# Patient Record
Sex: FEMALE | Race: WHITE | Employment: OTHER | URBAN - NONMETROPOLITAN AREA
[De-identification: names, ages, dates, MRNs, and addresses within clinical notes are randomized per-mention and may not be internally consistent; named-entity substitution may affect disease eponyms.]

---

## 2017-07-17 ENCOUNTER — HOSPITAL ENCOUNTER (OUTPATIENT)
Dept: NON INVASIVE DIAGNOSTICS | Age: 72
Discharge: HOME | End: 2017-07-17
Payer: MEDICARE

## 2019-09-06 ENCOUNTER — HOSPITAL ENCOUNTER (OUTPATIENT)
Dept: GENERAL RADIOLOGY | Age: 74
Discharge: HOME | End: 2019-09-06
Payer: MEDICARE

## 2019-09-06 ENCOUNTER — HOSPITAL ENCOUNTER (OUTPATIENT)
Age: 74
Discharge: HOME | End: 2019-09-06
Payer: MEDICARE

## 2019-09-06 DIAGNOSIS — M25.371 ANKLE INSTABILITY, RIGHT: ICD-10-CM

## 2019-09-06 DIAGNOSIS — M76.71 PERONEAL TENDONITIS, RIGHT: ICD-10-CM

## 2019-09-06 DIAGNOSIS — M67.01 SHORT ACHILLES TENDON OF RIGHT LOWER EXTREMITY: ICD-10-CM

## 2019-09-06 DIAGNOSIS — Z01.818 PRE-OP TESTING: ICD-10-CM

## 2019-09-06 LAB
ANION GAP SERPL CALCULATED.3IONS-SCNC: 14 MEQ/L (ref 8–16)
APTT: 33.2 SECONDS (ref 22–38)
BACTERIA: ABNORMAL
BILIRUBIN URINE: NEGATIVE
BLOOD, URINE: NEGATIVE
BUN BLDV-MCNC: 16 MG/DL (ref 7–22)
CALCIUM SERPL-MCNC: 9.4 MG/DL (ref 8.5–10.5)
CASTS: ABNORMAL /LPF
CASTS: ABNORMAL /LPF
CHARACTER, URINE: ABNORMAL
CHLORIDE BLD-SCNC: 105 MEQ/L (ref 98–111)
CO2: 24 MEQ/L (ref 23–33)
COLOR: YELLOW
CREAT SERPL-MCNC: 0.8 MG/DL (ref 0.4–1.2)
CRYSTALS: ABNORMAL
EKG ATRIAL RATE: 64 BPM
EKG P AXIS: 22 DEGREES
EKG P-R INTERVAL: 140 MS
EKG Q-T INTERVAL: 398 MS
EKG QRS DURATION: 76 MS
EKG QTC CALCULATION (BAZETT): 410 MS
EKG R AXIS: 26 DEGREES
EKG T AXIS: 78 DEGREES
EKG VENTRICULAR RATE: 64 BPM
EPITHELIAL CELLS, UA: ABNORMAL /HPF
ERYTHROCYTE [DISTWIDTH] IN BLOOD BY AUTOMATED COUNT: 14.6 % (ref 11.5–14.5)
ERYTHROCYTE [DISTWIDTH] IN BLOOD BY AUTOMATED COUNT: 44.9 FL (ref 35–45)
GFR SERPL CREATININE-BSD FRML MDRD: 71 ML/MIN/1.73M2
GLUCOSE BLD-MCNC: 76 MG/DL (ref 70–108)
GLUCOSE, URINE: NEGATIVE MG/DL
HCT VFR BLD CALC: 43.5 % (ref 37–47)
HEMOGLOBIN: 14 GM/DL (ref 12–16)
INR BLD: 0.94 (ref 0.85–1.13)
KETONES, URINE: NEGATIVE
LEUKOCYTE EST, POC: ABNORMAL
MCH RBC QN AUTO: 27.1 PG (ref 26–33)
MCHC RBC AUTO-ENTMCNC: 32.2 GM/DL (ref 32.2–35.5)
MCV RBC AUTO: 84.3 FL (ref 81–99)
MISCELLANEOUS LAB TEST RESULT: ABNORMAL
NITRITE, URINE: NEGATIVE
PH UA: 5 (ref 5–9)
PLATELET # BLD: 308 THOU/MM3 (ref 130–400)
PMV BLD AUTO: 10.3 FL (ref 9.4–12.4)
POTASSIUM SERPL-SCNC: 4.5 MEQ/L (ref 3.5–5.2)
PROTEIN UA: NEGATIVE MG/DL
RBC # BLD: 5.16 MILL/MM3 (ref 4.2–5.4)
RBC URINE: ABNORMAL /HPF
RENAL EPITHELIAL, UA: ABNORMAL
SODIUM BLD-SCNC: 143 MEQ/L (ref 135–145)
SPECIFIC GRAVITY UA: 1.02 (ref 1–1.03)
UROBILINOGEN, URINE: 0.2 EU/DL (ref 0–1)
WBC # BLD: 6.1 THOU/MM3 (ref 4.8–10.8)
WBC UA: ABNORMAL /HPF
YEAST: ABNORMAL

## 2019-09-06 PROCEDURE — 36415 COLL VENOUS BLD VENIPUNCTURE: CPT

## 2019-09-06 PROCEDURE — 85610 PROTHROMBIN TIME: CPT

## 2019-09-06 PROCEDURE — 85730 THROMBOPLASTIN TIME PARTIAL: CPT

## 2019-09-06 PROCEDURE — 80048 BASIC METABOLIC PNL TOTAL CA: CPT

## 2019-09-06 PROCEDURE — 85027 COMPLETE CBC AUTOMATED: CPT

## 2019-09-06 PROCEDURE — 93005 ELECTROCARDIOGRAM TRACING: CPT | Performed by: FAMILY MEDICINE

## 2019-09-06 PROCEDURE — 81001 URINALYSIS AUTO W/SCOPE: CPT

## 2019-09-06 PROCEDURE — 71046 X-RAY EXAM CHEST 2 VIEWS: CPT

## 2019-09-08 PROCEDURE — 93010 ELECTROCARDIOGRAM REPORT: CPT | Performed by: INTERNAL MEDICINE

## 2020-06-13 ENCOUNTER — APPOINTMENT (OUTPATIENT)
Dept: GENERAL RADIOLOGY | Age: 75
DRG: 246 | End: 2020-06-13
Payer: MEDICARE

## 2020-06-13 ENCOUNTER — HOSPITAL ENCOUNTER (INPATIENT)
Age: 75
LOS: 7 days | Discharge: HOME | DRG: 246 | End: 2020-06-20
Attending: FAMILY MEDICINE | Admitting: INTERNAL MEDICINE
Payer: MEDICARE

## 2020-06-13 PROBLEM — I21.3 STEMI (ST ELEVATION MYOCARDIAL INFARCTION) (HCC): Status: ACTIVE | Noted: 2020-06-13

## 2020-06-13 LAB
ACTIVATED CLOTTING TIME: 346 SECONDS (ref 1–150)
ACTIVATED CLOTTING TIME: 483 SECONDS (ref 1–150)
ALBUMIN SERPL-MCNC: 2.8 G/DL (ref 3.5–5.1)
ALP BLD-CCNC: 79 U/L (ref 38–126)
ALT SERPL-CCNC: 21 U/L (ref 11–66)
ANION GAP SERPL CALCULATED.3IONS-SCNC: 13 MEQ/L (ref 8–16)
AST SERPL-CCNC: 72 U/L (ref 5–40)
BACTERIA: ABNORMAL
BASOPHILS # BLD: 0.3 %
BASOPHILS # BLD: 0.4 %
BASOPHILS ABSOLUTE: 0 THOU/MM3 (ref 0–0.1)
BASOPHILS ABSOLUTE: 0.1 THOU/MM3 (ref 0–0.1)
BILIRUB SERPL-MCNC: 0.3 MG/DL (ref 0.3–1.2)
BILIRUBIN DIRECT: < 0.2 MG/DL (ref 0–0.3)
BILIRUBIN URINE: NEGATIVE
BLOOD, URINE: NEGATIVE
BUN BLDV-MCNC: 20 MG/DL (ref 7–22)
CALCIUM IONIZED: 1.06 MMOL/L (ref 1.12–1.32)
CALCIUM SERPL-MCNC: 7.9 MG/DL (ref 8.5–10.5)
CASTS: ABNORMAL /LPF
CASTS: ABNORMAL /LPF
CHARACTER, URINE: CLEAR
CHLORIDE BLD-SCNC: 100 MEQ/L (ref 98–111)
CO2: 19 MEQ/L (ref 23–33)
COLLECTED BY:: ABNORMAL
COLLECTED BY:: ABNORMAL
COLOR: YELLOW
CREAT SERPL-MCNC: 0.9 MG/DL (ref 0.4–1.2)
CRYSTALS: ABNORMAL
EKG ATRIAL RATE: 65 BPM
EKG P AXIS: 34 DEGREES
EKG P-R INTERVAL: 124 MS
EKG Q-T INTERVAL: 418 MS
EKG QRS DURATION: 80 MS
EKG QTC CALCULATION (BAZETT): 434 MS
EKG R AXIS: 1 DEGREES
EKG T AXIS: 2 DEGREES
EKG VENTRICULAR RATE: 65 BPM
EOSINOPHIL # BLD: 0.3 %
EOSINOPHIL # BLD: 0.6 %
EOSINOPHILS ABSOLUTE: 0 THOU/MM3 (ref 0–0.4)
EOSINOPHILS ABSOLUTE: 0.1 THOU/MM3 (ref 0–0.4)
EPITHELIAL CELLS, UA: ABNORMAL /HPF
ERYTHROCYTE [DISTWIDTH] IN BLOOD BY AUTOMATED COUNT: 14.8 % (ref 11.5–14.5)
ERYTHROCYTE [DISTWIDTH] IN BLOOD BY AUTOMATED COUNT: 14.8 % (ref 11.5–14.5)
ERYTHROCYTE [DISTWIDTH] IN BLOOD BY AUTOMATED COUNT: 46.1 FL (ref 35–45)
ERYTHROCYTE [DISTWIDTH] IN BLOOD BY AUTOMATED COUNT: 47.1 FL (ref 35–45)
GFR SERPL CREATININE-BSD FRML MDRD: 61 ML/MIN/1.73M2
GLUCOSE BLD-MCNC: 96 MG/DL (ref 70–108)
GLUCOSE, URINE: NEGATIVE MG/DL
HCT VFR BLD CALC: 39.2 % (ref 37–47)
HCT VFR BLD CALC: 45.7 % (ref 37–47)
HEMOGLOBIN: 12.3 GM/DL (ref 12–16)
HEMOGLOBIN: 14.5 GM/DL (ref 12–16)
IMMATURE GRANS (ABS): 0.05 THOU/MM3 (ref 0–0.07)
IMMATURE GRANS (ABS): 0.07 THOU/MM3 (ref 0–0.07)
IMMATURE GRANULOCYTES: 0.5 %
IMMATURE GRANULOCYTES: 0.5 %
KETONES, URINE: ABNORMAL
LACTIC ACID: 1.7 MMOL/L (ref 0.5–2.2)
LEUKOCYTE EST, POC: NEGATIVE
LYMPHOCYTES # BLD: 10.4 %
LYMPHOCYTES # BLD: 9.7 %
LYMPHOCYTES ABSOLUTE: 1.1 THOU/MM3 (ref 1–4.8)
LYMPHOCYTES ABSOLUTE: 1.5 THOU/MM3 (ref 1–4.8)
MCH RBC QN AUTO: 27.2 PG (ref 26–33)
MCH RBC QN AUTO: 27.3 PG (ref 26–33)
MCHC RBC AUTO-ENTMCNC: 31.4 GM/DL (ref 32.2–35.5)
MCHC RBC AUTO-ENTMCNC: 31.7 GM/DL (ref 32.2–35.5)
MCV RBC AUTO: 85.7 FL (ref 81–99)
MCV RBC AUTO: 86.9 FL (ref 81–99)
MISCELLANEOUS LAB TEST RESULT: ABNORMAL
MONOCYTES # BLD: 6.1 %
MONOCYTES # BLD: 9 %
MONOCYTES ABSOLUTE: 0.7 THOU/MM3 (ref 0.4–1.3)
MONOCYTES ABSOLUTE: 1.3 THOU/MM3 (ref 0.4–1.3)
NITRITE, URINE: POSITIVE
NUCLEATED RED BLOOD CELLS: 0 /100 WBC
NUCLEATED RED BLOOD CELLS: 0 /100 WBC
PH UA: 6 (ref 5–9)
PLATELET # BLD: 285 THOU/MM3 (ref 130–400)
PLATELET # BLD: 342 THOU/MM3 (ref 130–400)
PMV BLD AUTO: 9.7 FL (ref 9.4–12.4)
PMV BLD AUTO: 9.9 FL (ref 9.4–12.4)
POC ACTIVATED CLOTTING TIME KAOLIN: 213 SECONDS (ref 1–150)
POC O2 SATURATION: 69 % (ref 94–97)
POC O2 SATURATION: 99 % (ref 94–97)
POTASSIUM SERPL-SCNC: 4.2 MEQ/L (ref 3.5–5.2)
PROCALCITONIN: 0.1 NG/ML (ref 0.01–0.09)
PROTEIN UA: NEGATIVE MG/DL
RBC # BLD: 4.51 MILL/MM3 (ref 4.2–5.4)
RBC # BLD: 5.33 MILL/MM3 (ref 4.2–5.4)
RBC URINE: ABNORMAL /HPF
REASON FOR REJECTION: NORMAL
REJECTED TEST: NORMAL
RENAL EPITHELIAL, UA: ABNORMAL
SEG NEUTROPHILS: 79.1 %
SEG NEUTROPHILS: 83.1 %
SEGMENTED NEUTROPHILS ABSOLUTE COUNT: 11.2 THOU/MM3 (ref 1.8–7.7)
SEGMENTED NEUTROPHILS ABSOLUTE COUNT: 9.1 THOU/MM3 (ref 1.8–7.7)
SODIUM BLD-SCNC: 132 MEQ/L (ref 135–145)
SOURCE, BLOOD GAS: ABNORMAL
SOURCE, BLOOD GAS: ABNORMAL
SPECIFIC GRAVITY UA: <= 1.005 (ref 1–1.03)
TOTAL PROTEIN: 5.4 G/DL (ref 6.1–8)
UROBILINOGEN, URINE: 0.2 EU/DL (ref 0–1)
WBC # BLD: 10.9 THOU/MM3 (ref 4.8–10.8)
WBC # BLD: 14.1 THOU/MM3 (ref 4.8–10.8)
WBC UA: ABNORMAL /HPF
YEAST: ABNORMAL

## 2020-06-13 PROCEDURE — C1887 CATHETER, GUIDING: HCPCS

## 2020-06-13 PROCEDURE — 93010 ELECTROCARDIOGRAM REPORT: CPT | Performed by: INTERNAL MEDICINE

## 2020-06-13 PROCEDURE — 93460 R&L HRT ART/VENTRICLE ANGIO: CPT | Performed by: INTERNAL MEDICINE

## 2020-06-13 PROCEDURE — 6360000002 HC RX W HCPCS: Performed by: INTERNAL MEDICINE

## 2020-06-13 PROCEDURE — 6360000002 HC RX W HCPCS

## 2020-06-13 PROCEDURE — 85347 COAGULATION TIME ACTIVATED: CPT

## 2020-06-13 PROCEDURE — 87186 SC STD MICRODIL/AGAR DIL: CPT

## 2020-06-13 PROCEDURE — C9606 PERC D-E COR REVASC W AMI S: HCPCS | Performed by: INTERNAL MEDICINE

## 2020-06-13 PROCEDURE — 93005 ELECTROCARDIOGRAM TRACING: CPT | Performed by: NURSE PRACTITIONER

## 2020-06-13 PROCEDURE — 2000000000 HC ICU R&B

## 2020-06-13 PROCEDURE — C1769 GUIDE WIRE: HCPCS

## 2020-06-13 PROCEDURE — 027034Z DILATION OF CORONARY ARTERY, ONE ARTERY WITH DRUG-ELUTING INTRALUMINAL DEVICE, PERCUTANEOUS APPROACH: ICD-10-PCS | Performed by: INTERNAL MEDICINE

## 2020-06-13 PROCEDURE — 82810 BLOOD GASES O2 SAT ONLY: CPT

## 2020-06-13 PROCEDURE — 81001 URINALYSIS AUTO W/SCOPE: CPT

## 2020-06-13 PROCEDURE — 87077 CULTURE AEROBIC IDENTIFY: CPT

## 2020-06-13 PROCEDURE — B241ZZ3 ULTRASONOGRAPHY OF MULTIPLE CORONARY ARTERIES, INTRAVASCULAR: ICD-10-PCS | Performed by: INTERNAL MEDICINE

## 2020-06-13 PROCEDURE — C1725 CATH, TRANSLUMIN NON-LASER: HCPCS

## 2020-06-13 PROCEDURE — 82330 ASSAY OF CALCIUM: CPT

## 2020-06-13 PROCEDURE — 82248 BILIRUBIN DIRECT: CPT

## 2020-06-13 PROCEDURE — C1894 INTRO/SHEATH, NON-LASER: HCPCS

## 2020-06-13 PROCEDURE — 2580000003 HC RX 258: Performed by: NURSE PRACTITIONER

## 2020-06-13 PROCEDURE — 6360000004 HC RX CONTRAST MEDICATION: Performed by: INTERNAL MEDICINE

## 2020-06-13 PROCEDURE — 71045 X-RAY EXAM CHEST 1 VIEW: CPT

## 2020-06-13 PROCEDURE — 6360000002 HC RX W HCPCS: Performed by: NURSE PRACTITIONER

## 2020-06-13 PROCEDURE — 6370000000 HC RX 637 (ALT 250 FOR IP): Performed by: FAMILY MEDICINE

## 2020-06-13 PROCEDURE — 2709999900 HC NON-CHARGEABLE SUPPLY

## 2020-06-13 PROCEDURE — 80053 COMPREHEN METABOLIC PANEL: CPT

## 2020-06-13 PROCEDURE — 92941 PRQ TRLML REVSC TOT OCCL AMI: CPT | Performed by: INTERNAL MEDICINE

## 2020-06-13 PROCEDURE — 87086 URINE CULTURE/COLONY COUNT: CPT

## 2020-06-13 PROCEDURE — 96374 THER/PROPH/DIAG INJ IV PUSH: CPT

## 2020-06-13 PROCEDURE — 2720000010 HC SURG SUPPLY STERILE

## 2020-06-13 PROCEDURE — 75625 CONTRAST EXAM ABDOMINL AORTA: CPT | Performed by: INTERNAL MEDICINE

## 2020-06-13 PROCEDURE — 84145 PROCALCITONIN (PCT): CPT

## 2020-06-13 PROCEDURE — 027135Z DILATION OF CORONARY ARTERY, TWO ARTERIES WITH TWO DRUG-ELUTING INTRALUMINAL DEVICES, PERCUTANEOUS APPROACH: ICD-10-PCS | Performed by: INTERNAL MEDICINE

## 2020-06-13 PROCEDURE — 51702 INSERT TEMP BLADDER CATH: CPT

## 2020-06-13 PROCEDURE — 99221 1ST HOSP IP/OBS SF/LOW 40: CPT | Performed by: NURSE PRACTITIONER

## 2020-06-13 PROCEDURE — 83605 ASSAY OF LACTIC ACID: CPT

## 2020-06-13 PROCEDURE — 36415 COLL VENOUS BLD VENIPUNCTURE: CPT

## 2020-06-13 PROCEDURE — 2500000003 HC RX 250 WO HCPCS

## 2020-06-13 PROCEDURE — 92978 ENDOLUMINL IVUS OCT C 1ST: CPT | Performed by: INTERNAL MEDICINE

## 2020-06-13 PROCEDURE — 99282 EMERGENCY DEPT VISIT SF MDM: CPT

## 2020-06-13 PROCEDURE — 2580000003 HC RX 258: Performed by: INTERNAL MEDICINE

## 2020-06-13 PROCEDURE — C1760 CLOSURE DEV, VASC: HCPCS

## 2020-06-13 PROCEDURE — 99291 CRITICAL CARE FIRST HOUR: CPT | Performed by: INTERNAL MEDICINE

## 2020-06-13 PROCEDURE — 6360000002 HC RX W HCPCS: Performed by: FAMILY MEDICINE

## 2020-06-13 PROCEDURE — C1874 STENT, COATED/COV W/DEL SYS: HCPCS

## 2020-06-13 PROCEDURE — 85025 COMPLETE CBC W/AUTO DIFF WBC: CPT

## 2020-06-13 PROCEDURE — 93005 ELECTROCARDIOGRAM TRACING: CPT | Performed by: FAMILY MEDICINE

## 2020-06-13 PROCEDURE — B2111ZZ FLUOROSCOPY OF MULTIPLE CORONARY ARTERIES USING LOW OSMOLAR CONTRAST: ICD-10-PCS | Performed by: INTERNAL MEDICINE

## 2020-06-13 RX ORDER — SODIUM CHLORIDE 0.9 % (FLUSH) 0.9 %
10 SYRINGE (ML) INJECTION PRN
Status: DISCONTINUED | OUTPATIENT
Start: 2020-06-13 | End: 2020-06-16 | Stop reason: SDUPTHER

## 2020-06-13 RX ORDER — SODIUM CHLORIDE 0.9 % (FLUSH) 0.9 %
10 SYRINGE (ML) INJECTION EVERY 12 HOURS SCHEDULED
Status: DISCONTINUED | OUTPATIENT
Start: 2020-06-13 | End: 2020-06-16 | Stop reason: SDUPTHER

## 2020-06-13 RX ORDER — METOPROLOL SUCCINATE 50 MG/1
50 TABLET, EXTENDED RELEASE ORAL DAILY
Status: ON HOLD | COMMUNITY
End: 2020-06-20 | Stop reason: HOSPADM

## 2020-06-13 RX ORDER — VENLAFAXINE HYDROCHLORIDE 150 MG/1
150 CAPSULE, EXTENDED RELEASE ORAL DAILY
COMMUNITY

## 2020-06-13 RX ORDER — ACETAMINOPHEN 325 MG/1
650 TABLET ORAL EVERY 4 HOURS PRN
Status: DISCONTINUED | OUTPATIENT
Start: 2020-06-13 | End: 2020-06-19 | Stop reason: SDUPTHER

## 2020-06-13 RX ORDER — ASPIRIN 81 MG/1
81 TABLET ORAL DAILY
COMMUNITY

## 2020-06-13 RX ORDER — 0.9 % SODIUM CHLORIDE 0.9 %
500 INTRAVENOUS SOLUTION INTRAVENOUS ONCE
Status: COMPLETED | OUTPATIENT
Start: 2020-06-13 | End: 2020-06-13

## 2020-06-13 RX ORDER — SODIUM CHLORIDE 9 MG/ML
INJECTION, SOLUTION INTRAVENOUS CONTINUOUS
Status: DISCONTINUED | OUTPATIENT
Start: 2020-06-13 | End: 2020-06-15

## 2020-06-13 RX ORDER — 0.9 % SODIUM CHLORIDE 0.9 %
1000 INTRAVENOUS SOLUTION INTRAVENOUS ONCE
Status: COMPLETED | OUTPATIENT
Start: 2020-06-13 | End: 2020-06-14

## 2020-06-13 RX ORDER — 0.9 % SODIUM CHLORIDE 0.9 %
1000 INTRAVENOUS SOLUTION INTRAVENOUS ONCE
Status: COMPLETED | OUTPATIENT
Start: 2020-06-13 | End: 2020-06-13

## 2020-06-13 RX ORDER — HEPARIN SODIUM 10000 [USP'U]/100ML
12 INJECTION, SOLUTION INTRAVENOUS CONTINUOUS
Status: DISCONTINUED | OUTPATIENT
Start: 2020-06-13 | End: 2020-06-13

## 2020-06-13 RX ORDER — HEPARIN SODIUM 1000 [USP'U]/ML
4000 INJECTION, SOLUTION INTRAVENOUS; SUBCUTANEOUS ONCE
Status: COMPLETED | OUTPATIENT
Start: 2020-06-13 | End: 2020-06-13

## 2020-06-13 RX ORDER — M-VIT,TX,IRON,MINS/CALC/FOLIC 27MG-0.4MG
1 TABLET ORAL DAILY
Status: ON HOLD | COMMUNITY
End: 2020-06-15 | Stop reason: ALTCHOICE

## 2020-06-13 RX ORDER — ASPIRIN 81 MG/1
81 TABLET, CHEWABLE ORAL DAILY
Status: DISCONTINUED | OUTPATIENT
Start: 2020-06-14 | End: 2020-06-20 | Stop reason: HOSPADM

## 2020-06-13 RX ORDER — LEVOTHYROXINE SODIUM 0.07 MG/1
75 TABLET ORAL DAILY
COMMUNITY

## 2020-06-13 RX ORDER — MORPHINE SULFATE 2 MG/ML
1 INJECTION, SOLUTION INTRAMUSCULAR; INTRAVENOUS ONCE
Status: COMPLETED | OUTPATIENT
Start: 2020-06-13 | End: 2020-06-13

## 2020-06-13 RX ADMIN — SODIUM CHLORIDE 500 ML: 9 INJECTION, SOLUTION INTRAVENOUS at 23:06

## 2020-06-13 RX ADMIN — CEFTRIAXONE SODIUM 1 G: 1 INJECTION, POWDER, FOR SOLUTION INTRAMUSCULAR; INTRAVENOUS at 23:38

## 2020-06-13 RX ADMIN — IOPAMIDOL 230 ML: 755 INJECTION, SOLUTION INTRAVENOUS at 20:08

## 2020-06-13 RX ADMIN — SODIUM CHLORIDE 1000 ML: 9 INJECTION, SOLUTION INTRAVENOUS at 19:50

## 2020-06-13 RX ADMIN — Medication 10 ML: at 22:27

## 2020-06-13 RX ADMIN — TICAGRELOR 180 MG: 90 TABLET ORAL at 18:33

## 2020-06-13 RX ADMIN — MORPHINE SULFATE 1 MG: 2 INJECTION, SOLUTION INTRAMUSCULAR; INTRAVENOUS at 22:51

## 2020-06-13 RX ADMIN — SODIUM CHLORIDE: 9 INJECTION, SOLUTION INTRAVENOUS at 22:02

## 2020-06-13 RX ADMIN — HEPARIN SODIUM 4000 UNITS: 1000 INJECTION INTRAVENOUS; SUBCUTANEOUS at 18:34

## 2020-06-13 RX ADMIN — TICAGRELOR 180 MG: 90 TABLET ORAL at 22:27

## 2020-06-13 ASSESSMENT — PAIN DESCRIPTION - PROGRESSION: CLINICAL_PROGRESSION: GRADUALLY IMPROVING

## 2020-06-13 ASSESSMENT — PAIN DESCRIPTION - FREQUENCY: FREQUENCY: CONTINUOUS

## 2020-06-13 ASSESSMENT — PAIN DESCRIPTION - LOCATION: LOCATION: CHEST

## 2020-06-13 ASSESSMENT — PAIN SCALES - GENERAL
PAINLEVEL_OUTOF10: 0
PAINLEVEL_OUTOF10: 3
PAINLEVEL_OUTOF10: 3

## 2020-06-13 ASSESSMENT — PAIN DESCRIPTION - ORIENTATION: ORIENTATION: MID

## 2020-06-14 LAB
ANION GAP SERPL CALCULATED.3IONS-SCNC: 8 MEQ/L (ref 8–16)
AVERAGE GLUCOSE: 108 MG/DL (ref 70–126)
BUN BLDV-MCNC: 14 MG/DL (ref 7–22)
CALCIUM IONIZED: 1.11 MMOL/L (ref 1.12–1.32)
CALCIUM SERPL-MCNC: 7.2 MG/DL (ref 8.5–10.5)
CHLORIDE BLD-SCNC: 108 MEQ/L (ref 98–111)
CHOLESTEROL, TOTAL: 119 MG/DL (ref 100–199)
CO2: 19 MEQ/L (ref 23–33)
CREAT SERPL-MCNC: 0.7 MG/DL (ref 0.4–1.2)
ERYTHROCYTE [DISTWIDTH] IN BLOOD BY AUTOMATED COUNT: 15.4 % (ref 11.5–14.5)
ERYTHROCYTE [DISTWIDTH] IN BLOOD BY AUTOMATED COUNT: 48.7 FL (ref 35–45)
GFR SERPL CREATININE-BSD FRML MDRD: 82 ML/MIN/1.73M2
GLUCOSE BLD-MCNC: 120 MG/DL (ref 70–108)
HBA1C MFR BLD: 5.6 % (ref 4.4–6.4)
HCT VFR BLD CALC: 33.4 % (ref 37–47)
HCT VFR BLD CALC: 35.3 % (ref 37–47)
HDLC SERPL-MCNC: 31 MG/DL
HEMOGLOBIN: 10.4 GM/DL (ref 12–16)
HEMOGLOBIN: 11.3 GM/DL (ref 12–16)
LDL CHOLESTEROL CALCULATED: 71 MG/DL
MAGNESIUM: 1.7 MG/DL (ref 1.6–2.4)
MCH RBC QN AUTO: 27.2 PG (ref 26–33)
MCHC RBC AUTO-ENTMCNC: 31.1 GM/DL (ref 32.2–35.5)
MCV RBC AUTO: 87.2 FL (ref 81–99)
PLATELET # BLD: 277 THOU/MM3 (ref 130–400)
PMV BLD AUTO: 9.9 FL (ref 9.4–12.4)
POC ACTIVATED CLOTTING TIME KAOLIN: 125 SECONDS (ref 1–150)
POTASSIUM REFLEX MAGNESIUM: 4.3 MEQ/L (ref 3.5–5.2)
RBC # BLD: 3.83 MILL/MM3 (ref 4.2–5.4)
SODIUM BLD-SCNC: 135 MEQ/L (ref 135–145)
TRIGL SERPL-MCNC: 87 MG/DL (ref 0–199)
WBC # BLD: 8.3 THOU/MM3 (ref 4.8–10.8)

## 2020-06-14 PROCEDURE — 6360000002 HC RX W HCPCS: Performed by: NURSE PRACTITIONER

## 2020-06-14 PROCEDURE — 80048 BASIC METABOLIC PNL TOTAL CA: CPT

## 2020-06-14 PROCEDURE — 99232 SBSQ HOSP IP/OBS MODERATE 35: CPT | Performed by: PHYSICIAN ASSISTANT

## 2020-06-14 PROCEDURE — 2580000003 HC RX 258: Performed by: NURSE PRACTITIONER

## 2020-06-14 PROCEDURE — 6370000000 HC RX 637 (ALT 250 FOR IP): Performed by: NURSE PRACTITIONER

## 2020-06-14 PROCEDURE — 99233 SBSQ HOSP IP/OBS HIGH 50: CPT | Performed by: INTERNAL MEDICINE

## 2020-06-14 PROCEDURE — 85018 HEMOGLOBIN: CPT

## 2020-06-14 PROCEDURE — 83735 ASSAY OF MAGNESIUM: CPT

## 2020-06-14 PROCEDURE — 36415 COLL VENOUS BLD VENIPUNCTURE: CPT

## 2020-06-14 PROCEDURE — 82330 ASSAY OF CALCIUM: CPT

## 2020-06-14 PROCEDURE — 94761 N-INVAS EAR/PLS OXIMETRY MLT: CPT

## 2020-06-14 PROCEDURE — 6370000000 HC RX 637 (ALT 250 FOR IP): Performed by: PHYSICIAN ASSISTANT

## 2020-06-14 PROCEDURE — 83036 HEMOGLOBIN GLYCOSYLATED A1C: CPT

## 2020-06-14 PROCEDURE — 85347 COAGULATION TIME ACTIVATED: CPT

## 2020-06-14 PROCEDURE — 2140000000 HC CCU INTERMEDIATE R&B

## 2020-06-14 PROCEDURE — 6370000000 HC RX 637 (ALT 250 FOR IP): Performed by: INTERNAL MEDICINE

## 2020-06-14 PROCEDURE — 80061 LIPID PANEL: CPT

## 2020-06-14 PROCEDURE — 99232 SBSQ HOSP IP/OBS MODERATE 35: CPT | Performed by: NURSE PRACTITIONER

## 2020-06-14 PROCEDURE — 2700000000 HC OXYGEN THERAPY PER DAY

## 2020-06-14 PROCEDURE — 6360000002 HC RX W HCPCS: Performed by: PHYSICIAN ASSISTANT

## 2020-06-14 PROCEDURE — 2580000003 HC RX 258: Performed by: INTERNAL MEDICINE

## 2020-06-14 PROCEDURE — 2709999900 HC NON-CHARGEABLE SUPPLY

## 2020-06-14 PROCEDURE — 85027 COMPLETE CBC AUTOMATED: CPT

## 2020-06-14 PROCEDURE — 85014 HEMATOCRIT: CPT

## 2020-06-14 RX ORDER — VENLAFAXINE HYDROCHLORIDE 150 MG/1
150 CAPSULE, EXTENDED RELEASE ORAL DAILY
Status: DISCONTINUED | OUTPATIENT
Start: 2020-06-14 | End: 2020-06-20 | Stop reason: HOSPADM

## 2020-06-14 RX ORDER — MAGNESIUM SULFATE IN WATER 40 MG/ML
2 INJECTION, SOLUTION INTRAVENOUS ONCE
Status: COMPLETED | OUTPATIENT
Start: 2020-06-14 | End: 2020-06-14

## 2020-06-14 RX ORDER — ASPIRIN 81 MG/1
81 TABLET ORAL DAILY
Status: DISCONTINUED | OUTPATIENT
Start: 2020-06-14 | End: 2020-06-14 | Stop reason: SDUPTHER

## 2020-06-14 RX ORDER — METOPROLOL SUCCINATE 50 MG/1
50 TABLET, EXTENDED RELEASE ORAL DAILY
Status: DISCONTINUED | OUTPATIENT
Start: 2020-06-14 | End: 2020-06-14

## 2020-06-14 RX ORDER — ATORVASTATIN CALCIUM 80 MG/1
80 TABLET, FILM COATED ORAL NIGHTLY
Status: DISCONTINUED | OUTPATIENT
Start: 2020-06-14 | End: 2020-06-20 | Stop reason: HOSPADM

## 2020-06-14 RX ORDER — M-VIT,TX,IRON,MINS/CALC/FOLIC 27MG-0.4MG
1 TABLET ORAL
Status: DISCONTINUED | OUTPATIENT
Start: 2020-06-14 | End: 2020-06-20 | Stop reason: HOSPADM

## 2020-06-14 RX ORDER — LEVOTHYROXINE SODIUM 0.07 MG/1
75 TABLET ORAL
Status: DISCONTINUED | OUTPATIENT
Start: 2020-06-14 | End: 2020-06-20 | Stop reason: HOSPADM

## 2020-06-14 RX ADMIN — TICAGRELOR 90 MG: 90 TABLET ORAL at 20:46

## 2020-06-14 RX ADMIN — Medication 10 ML: at 08:58

## 2020-06-14 RX ADMIN — Medication 10 ML: at 20:45

## 2020-06-14 RX ADMIN — ASPIRIN 81 MG 81 MG: 81 TABLET ORAL at 08:57

## 2020-06-14 RX ADMIN — SODIUM CHLORIDE 1000 ML: 9 INJECTION, SOLUTION INTRAVENOUS at 01:01

## 2020-06-14 RX ADMIN — VENLAFAXINE HYDROCHLORIDE 150 MG: 150 CAPSULE, EXTENDED RELEASE ORAL at 20:43

## 2020-06-14 RX ADMIN — SODIUM CHLORIDE: 9 INJECTION, SOLUTION INTRAVENOUS at 16:10

## 2020-06-14 RX ADMIN — MULTIPLE VITAMINS W/ MINERALS TAB 1 TABLET: TAB at 16:05

## 2020-06-14 RX ADMIN — METOPROLOL TARTRATE 12.5 MG: 25 TABLET ORAL at 16:05

## 2020-06-14 RX ADMIN — MAGNESIUM SULFATE HEPTAHYDRATE 2 G: 40 INJECTION, SOLUTION INTRAVENOUS at 09:52

## 2020-06-14 RX ADMIN — TICAGRELOR 90 MG: 90 TABLET ORAL at 08:57

## 2020-06-14 RX ADMIN — ATORVASTATIN CALCIUM 80 MG: 80 TABLET, FILM COATED ORAL at 20:43

## 2020-06-14 RX ADMIN — VENLAFAXINE HYDROCHLORIDE 150 MG: 150 CAPSULE, EXTENDED RELEASE ORAL at 06:55

## 2020-06-14 RX ADMIN — CEFTRIAXONE SODIUM 1 G: 1 INJECTION, POWDER, FOR SOLUTION INTRAMUSCULAR; INTRAVENOUS at 23:32

## 2020-06-14 ASSESSMENT — PAIN SCALES - GENERAL
PAINLEVEL_OUTOF10: 0
PAINLEVEL_OUTOF10: 0

## 2020-06-14 ASSESSMENT — ENCOUNTER SYMPTOMS
COLOR CHANGE: 0
SORE THROAT: 0
SHORTNESS OF BREATH: 0
CHEST TIGHTNESS: 0
PHOTOPHOBIA: 0
WHEEZING: 0
BACK PAIN: 0
TROUBLE SWALLOWING: 0
VOMITING: 0
NAUSEA: 0

## 2020-06-14 NOTE — PROGRESS NOTES
no thyromegaly   Musculoskeletal: normal range of motion, no joint swelling, deformity or tenderness  Neurological: alert, oriented, normal speech, no focal findings or movement disorder noted  Right groin - +ecchymosis, femstop in place, sheath in place    Medications:    levothyroxine  75 mcg Oral QAM AC    metoprolol succinate  50 mg Oral Daily    therapeutic multivitamin-minerals  1 tablet Oral Lunch    venlafaxine  150 mg Oral Daily    sodium chloride flush  10 mL Intravenous 2 times per day    tirofiban  25 mcg/kg Intravenous Once    ticagrelor  90 mg Oral BID    aspirin  81 mg Oral Daily    calcium replacement protocol   Other RX Placeholder    cefTRIAXone (ROCEPHIN) IV  1 g Intravenous Q24H      tirofiban Stopped (06/14/20 0600)    sodium chloride 100 mL/hr at 06/13/20 2202    phenylephrine (BASILIA-SYNEPHRINE) 50mg/250mL infusion       sodium chloride flush, 10 mL, PRN  acetaminophen, 650 mg, Q4H PRN  magnesium hydroxide, 30 mL, Daily PRN        Lab Data:    Cardiac Enzymes:  No results for input(s): CKTOTAL, CKMB, CKMBINDEX, TROPONINI in the last 72 hours.     CBC:   Lab Results   Component Value Date    WBC 8.3 06/14/2020    RBC 3.83 06/14/2020    HGB 10.4 06/14/2020    HCT 33.4 06/14/2020     06/14/2020       CMP:    Lab Results   Component Value Date     06/14/2020    K 4.3 06/14/2020     06/14/2020    CO2 19 06/14/2020    BUN 14 06/14/2020    CREATININE 0.7 06/14/2020    LABGLOM 82 06/14/2020    GLUCOSE 120 06/14/2020    CALCIUM 7.2 06/14/2020       Hepatic Function Panel:    Lab Results   Component Value Date    ALKPHOS 79 06/13/2020    ALT 21 06/13/2020    AST 72 06/13/2020    PROT 5.4 06/13/2020    BILITOT 0.3 06/13/2020    BILIDIR <0.2 06/13/2020    LABALBU 2.8 06/13/2020       Magnesium:    Lab Results   Component Value Date    MG 1.7 06/14/2020       PT/INR:    Lab Results   Component Value Date    INR 0.94 09/06/2019       HgBA1c:    Lab Results   Component Value Date LABA1C 5.6 06/14/2020       FLP:    Lab Results   Component Value Date    TRIG 87 06/14/2020    HDL 31 06/14/2020    LDLCALC 71 06/14/2020       TSH:  No results found for: TSH      Assessment:    STEMI  Hypotension    S/p cath 6/13/20 - LCX STEMI s/p 1 DAVIS - with significant thrombotic burden to 3 OM - IV Aggrastat given  Has severe LAD and RCA stenosis - needs PCI prior to discharge  DAPT  RHC shows RA 8, PA saturation 68%  Patent iliofemoral system  EF 45%    ICMP - ef 45 per cath  NSVT  ? ? New onset afib on ekg - currently nsr  Hx HTN  Dyslipidemia  UTI - ICU team managing - on rocephin    Plan:  · Echo pending  · Cont asa/brilinta  · Add lipitor 80  · Stop toprol xl, add lopressor 12.5 bid  · lft in am  · Keep mag >2 and K >4  · Staged pci Tuesday  · Cardiac rehab  · Sl ntg upon dc         Electronically signed by Laurent Tay PA-C on 6/14/2020 at 8:56 AM

## 2020-06-14 NOTE — CONSULTS
CRITICAL CARE H+P      Patient:  Britta Ruggiero    Unit/Bed:4D-12/012-A  YOB: 1945  MRN: [id number]   PCP: Elana Eubanks. Cristina Hooks MD  Date of Admission: 6/13/2020  Chief Complaint:- chest pain, hypotension    Assessment and Plan:    1. STEMI:  LCX-DAVIS, with significant thrombotic burden to 3 OM-IV Aggrastat given, EF 45%. DAPT therapy, Lipid and HgbA1c pending for the AM. ECHO. Cardiology to follow. 2. CAD:  6/13-[facility]-Has severe LAD and RCA stenosis will need PCI prior to discharge. 3. Hypotension:  Hypovolemic versus Cardiogenic shock. CO 2.58/CI 1.5. Clinically patient has improved with IV fluid bolusing of 0.9NS. Lactic is nml. EKG repeated with no acute changes. Cardiology Dr. Zander Pablo did complete bedside POCUS no pericardial effusion, LV function mildly reduced. Bedside Bio-impedence monitor applied with CO-3.8/ CI 2.2 after 1L 0.9NS. Trend H/H. If no improvement Cardiology will proceed with Impella and complete revascularization. 4. Cystitis:  No hematuria, likely ECOLI-Nitrates positive, Rocephin has been started. 5. Hyponatremia: mild,  likely hypovolemic, possibly euvolemic. Patient is on Effexor, TSH pending. IV fluids to continue. 6. Nonanion gap acidosis:  Repeat BMP and monitor  7. Hypoalbuminemia:  monitor  8. Essential HPTN:  History, Toprol XL continued with parameters to hold. 9. Dyslipidemia:  Lipid panel is pending, Livalo home medication continued  10. GERD:  History, monitor  11. Hypothyroidsim: history, Synthroid restarted  12. Depression:  History, Effexor restarted. 13. Obesity:  BMI 32.1    INITIAL H AND P AND ICU COURSE:  Jan Quiroz 69year old  female admitted to the ICU s/p CCATH intervention. Patient has a significant history of lifetime nonsmoker, essential hypertension, stress test-2019- per patient negative, Dyslipidemia, GERD, and Hypothyroidism.     Jaylen Chavez presented to [facility] ER 6/13/2020 with complaint of substernal chest pain noted while trimming

## 2020-06-14 NOTE — BRIEF OP NOTE
[facility]  Sedation/Analgesia Post Sedation Record    Pt Name: Loraine Palumbo  Account number: [de-identified]  MRN: [id number]  YOB: 1945  Procedure Performed By: Martir Rosales, [address]  Primary Care Physician: Shena Ware. Grace Bui MD  Date: 6/13/2020    POST-PROCEDURE    Physicians/Assistants: KESHIA Galicia MD    Procedure Performed:Cath/ PCI      Sedation/Anesthesia: Versed/ Fentanyl and 2% xylocaine local anesthesia. Estimated Blood Loss: < 50 ml. Specimens Removed: None         Disposition of Specimen: N/A        Complications: No Immediate Complications.        Post-procedure Diagnosis/Findings:     LCX STEMI s/p 1 DAVIS - with significant thrombotic burden to 3 OM - IV Aggrastat given    Has severe LAD and RCA stenosis - needs PCI prior to discharge  DAPT  RHC shows RA 8, PA saturation 68%  EF 45%    BP borderline - if any concerns overnight, or need for pressors, will proceed Impella and complete revascularization               KESHIA Galicia MD  Electronically signed 6/13/2020 at 8:09 PM  Interventional Cardiology

## 2020-06-14 NOTE — PRE SEDATION
[facility]  Sedation/Analgesia History & Physical    Pt Name: Rylie Pena  Account number: [de-identified]  MRN: [id number]  YOB: 1945  Provider Performing Procedure: Robin Ritter MD  Referring Provider: James Bustillos MD   Primary Care Physician: Cl Remy. Monique Grigsby MD  Date: 6/13/2020    PRE-PROCEDURE    Code Status: FULL CODE  Brief History/Pre-Procedure Diagnosis:  STEMI    Consent: : I have discussed with the patient risks, benefits, and alternatives (and relevant risks, benefits, and side effects related to alternatives or not receiving care), and likelihood of the success. The patient and/or representative appear to understand and agree to proceed. The discussion encompasses risks, benefits, and side effects related to the alternatives and the risks related to not receiving the proposed care, treatment, and services. The indication, risks and benefits of the procedure and possible therapeutic consequences and alternatives were discussed with the patient. The patient was given the opportunity to ask questions and to have them answered to his/her satisfaction. Risks of the procedure include but are not limited to the following: Bleeding, hematoma including retroperitoneal hemmorhage, infection, pain and discomfort, injury to the aorta and other blood vessels, rhythm disturbance, low blood pressure, myocardial infarction, stroke, kidney damage/failure, myocardial perforation, allergic reactions to sedatives/contrast material, loss of pulse/vascular compromise requiring surgery, aneurysm/pseudoaneurysm formation, possible loss of a limb/hand/leg, needing blood transfusion, requiring emergent open heart surgery or emergent coronary intervention, the need for intubation/respiratory support, the requirement for defibrillation/cardioversion, and death. Alternatives to and omission of the suggested procedure were discussed.  The patient had no further questions and wished to proceed; Classification:  []1 []2 []3 [x]4 []5  Class 1: A normal healthy patient  Class 2: Pt with mild to moderate systemic disease  Class 3: Severe systemic disease or disturbance  Class 4: Severe systemic disorders that are already life threatening. Class 5: Moribund pt with little chances of survival, for more than 24 hours. Mallampati I Airway Classification:   []1 [x]2 []3 []4    [x]Pre-procedure diagnostic studies complete and results available. Comment:    [x]Previous sedation/anesthesia experiences assessed. Comment:    [x]The patient is an appropriate candidate to undergo the planned procedure sedation and anesthesia. (Refer to nursing sedation/analgesia documentation record)  [x]Formulation and discussion of sedation/procedure plan, risks, and expectations with patient and/or responsible adult completed. [x]Patient examined immediately prior to the procedure.  (Refer to nursing sedation/analgesia documentation record)    Cecy Huffman MD   Electronically signed 6/13/2020 at 8:08 PM

## 2020-06-14 NOTE — FLOWSHEET NOTE
[address] Dr. Juani Rojas regarding transfer orders. Agreed with transfer to [facility] - Report called to JULIO C Mosqueda on 3B. Notified  of transfer to .

## 2020-06-15 ENCOUNTER — PREP FOR PROCEDURE (OUTPATIENT)
Dept: CARDIOLOGY | Age: 75
End: 2020-06-15

## 2020-06-15 ENCOUNTER — APPOINTMENT (OUTPATIENT)
Dept: INTERVENTIONAL RADIOLOGY/VASCULAR | Age: 75
DRG: 246 | End: 2020-06-15
Payer: MEDICARE

## 2020-06-15 LAB
ABO: NORMAL
ALBUMIN SERPL-MCNC: 3.1 G/DL (ref 3.5–5.1)
ALP BLD-CCNC: 68 U/L (ref 38–126)
ALT SERPL-CCNC: 35 U/L (ref 11–66)
ANION GAP SERPL CALCULATED.3IONS-SCNC: 10 MEQ/L (ref 8–16)
ANTIBODY SCREEN: NORMAL
AST SERPL-CCNC: 157 U/L (ref 5–40)
BILIRUB SERPL-MCNC: 0.4 MG/DL (ref 0.3–1.2)
BILIRUBIN DIRECT: < 0.2 MG/DL (ref 0–0.3)
BUN BLDV-MCNC: 9 MG/DL (ref 7–22)
CALCIUM SERPL-MCNC: 7.8 MG/DL (ref 8.5–10.5)
CHLORIDE BLD-SCNC: 108 MEQ/L (ref 98–111)
CO2: 21 MEQ/L (ref 23–33)
COLLECTED BY:: ABNORMAL
CREAT SERPL-MCNC: 0.5 MG/DL (ref 0.4–1.2)
EKG ATRIAL RATE: 47 BPM
EKG ATRIAL RATE: 71 BPM
EKG P AXIS: 39 DEGREES
EKG P-R INTERVAL: 138 MS
EKG Q-T INTERVAL: 422 MS
EKG Q-T INTERVAL: 430 MS
EKG QRS DURATION: 124 MS
EKG QRS DURATION: 70 MS
EKG QTC CALCULATION (BAZETT): 458 MS
EKG QTC CALCULATION (BAZETT): 511 MS
EKG R AXIS: 144 DEGREES
EKG R AXIS: 55 DEGREES
EKG T AXIS: -5 DEGREES
EKG T AXIS: 67 DEGREES
EKG VENTRICULAR RATE: 71 BPM
EKG VENTRICULAR RATE: 85 BPM
ERYTHROCYTE [DISTWIDTH] IN BLOOD BY AUTOMATED COUNT: 16 % (ref 11.5–14.5)
ERYTHROCYTE [DISTWIDTH] IN BLOOD BY AUTOMATED COUNT: 52.6 FL (ref 35–45)
FERRITIN: 39 NG/ML (ref 10–291)
FOLATE: 11.2 NG/ML (ref 4.8–24.2)
GFR SERPL CREATININE-BSD FRML MDRD: > 90 ML/MIN/1.73M2
GLUCOSE BLD-MCNC: 125 MG/DL (ref 70–108)
HCT VFR BLD CALC: 26 % (ref 37–47)
HCT VFR BLD CALC: 29.6 % (ref 37–47)
HEMOCCULT STL QL: POSITIVE
HEMOGLOBIN: 8 GM/DL (ref 12–16)
HEMOGLOBIN: 8.9 GM/DL (ref 12–16)
IRON SATURATION: 12 % (ref 20–50)
IRON: 32 UG/DL (ref 50–170)
LV EF: 50 %
LVEF MODALITY: NORMAL
MAGNESIUM: 2.2 MG/DL (ref 1.6–2.4)
MCH RBC QN AUTO: 27.4 PG (ref 26–33)
MCHC RBC AUTO-ENTMCNC: 30.1 GM/DL (ref 32.2–35.5)
MCV RBC AUTO: 91.1 FL (ref 81–99)
ORGANISM: ABNORMAL
PLATELET # BLD: 218 THOU/MM3 (ref 130–400)
PMV BLD AUTO: 10 FL (ref 9.4–12.4)
POC O2 SATURATION: 98 % (ref 94–97)
POTASSIUM SERPL-SCNC: 3.7 MEQ/L (ref 3.5–5.2)
RBC # BLD: 3.25 MILL/MM3 (ref 4.2–5.4)
RH FACTOR: NORMAL
SODIUM BLD-SCNC: 139 MEQ/L (ref 135–145)
SOURCE, BLOOD GAS: ABNORMAL
TOTAL IRON BINDING CAPACITY: 263 UG/DL (ref 171–450)
TOTAL PROTEIN: 5.2 G/DL (ref 6.1–8)
URINE CULTURE, ROUTINE: ABNORMAL
VITAMIN B-12: 790 PG/ML (ref 211–911)
WBC # BLD: 7.4 THOU/MM3 (ref 4.8–10.8)

## 2020-06-15 PROCEDURE — 6370000000 HC RX 637 (ALT 250 FOR IP): Performed by: INTERNAL MEDICINE

## 2020-06-15 PROCEDURE — 80076 HEPATIC FUNCTION PANEL: CPT

## 2020-06-15 PROCEDURE — 82728 ASSAY OF FERRITIN: CPT

## 2020-06-15 PROCEDURE — 86850 RBC ANTIBODY SCREEN: CPT

## 2020-06-15 PROCEDURE — 6370000000 HC RX 637 (ALT 250 FOR IP): Performed by: NURSE PRACTITIONER

## 2020-06-15 PROCEDURE — 82607 VITAMIN B-12: CPT

## 2020-06-15 PROCEDURE — 86923 COMPATIBILITY TEST ELECTRIC: CPT

## 2020-06-15 PROCEDURE — 2140000000 HC CCU INTERMEDIATE R&B

## 2020-06-15 PROCEDURE — 82810 BLOOD GASES O2 SAT ONLY: CPT

## 2020-06-15 PROCEDURE — 99232 SBSQ HOSP IP/OBS MODERATE 35: CPT | Performed by: FAMILY MEDICINE

## 2020-06-15 PROCEDURE — 93010 ELECTROCARDIOGRAM REPORT: CPT | Performed by: INTERNAL MEDICINE

## 2020-06-15 PROCEDURE — 82272 OCCULT BLD FECES 1-3 TESTS: CPT

## 2020-06-15 PROCEDURE — 85027 COMPLETE CBC AUTOMATED: CPT

## 2020-06-15 PROCEDURE — 94760 N-INVAS EAR/PLS OXIMETRY 1: CPT

## 2020-06-15 PROCEDURE — 83540 ASSAY OF IRON: CPT

## 2020-06-15 PROCEDURE — 80048 BASIC METABOLIC PNL TOTAL CA: CPT

## 2020-06-15 PROCEDURE — 86901 BLOOD TYPING SEROLOGIC RH(D): CPT

## 2020-06-15 PROCEDURE — 93306 TTE W/DOPPLER COMPLETE: CPT

## 2020-06-15 PROCEDURE — 82746 ASSAY OF FOLIC ACID SERUM: CPT

## 2020-06-15 PROCEDURE — P9016 RBC LEUKOCYTES REDUCED: HCPCS

## 2020-06-15 PROCEDURE — 36415 COLL VENOUS BLD VENIPUNCTURE: CPT

## 2020-06-15 PROCEDURE — 85018 HEMOGLOBIN: CPT

## 2020-06-15 PROCEDURE — 86900 BLOOD TYPING SEROLOGIC ABO: CPT

## 2020-06-15 PROCEDURE — 99232 SBSQ HOSP IP/OBS MODERATE 35: CPT | Performed by: PHYSICIAN ASSISTANT

## 2020-06-15 PROCEDURE — 6370000000 HC RX 637 (ALT 250 FOR IP): Performed by: PHYSICIAN ASSISTANT

## 2020-06-15 PROCEDURE — 2580000003 HC RX 258: Performed by: INTERNAL MEDICINE

## 2020-06-15 PROCEDURE — 85014 HEMATOCRIT: CPT

## 2020-06-15 PROCEDURE — 83550 IRON BINDING TEST: CPT

## 2020-06-15 PROCEDURE — 83735 ASSAY OF MAGNESIUM: CPT

## 2020-06-15 PROCEDURE — 93926 LOWER EXTREMITY STUDY: CPT

## 2020-06-15 PROCEDURE — 36430 TRANSFUSION BLD/BLD COMPNT: CPT

## 2020-06-15 PROCEDURE — 2580000003 HC RX 258: Performed by: STUDENT IN AN ORGANIZED HEALTH CARE EDUCATION/TRAINING PROGRAM

## 2020-06-15 RX ORDER — PANTOPRAZOLE SODIUM 40 MG/1
40 TABLET, DELAYED RELEASE ORAL
Status: DISCONTINUED | OUTPATIENT
Start: 2020-06-15 | End: 2020-06-16

## 2020-06-15 RX ORDER — ONDANSETRON 2 MG/ML
4 INJECTION INTRAMUSCULAR; INTRAVENOUS EVERY 6 HOURS PRN
Status: DISCONTINUED | OUTPATIENT
Start: 2020-06-15 | End: 2020-06-20 | Stop reason: HOSPADM

## 2020-06-15 RX ORDER — 0.9 % SODIUM CHLORIDE 0.9 %
20 INTRAVENOUS SOLUTION INTRAVENOUS ONCE
Status: COMPLETED | OUTPATIENT
Start: 2020-06-15 | End: 2020-06-16

## 2020-06-15 RX ADMIN — TICAGRELOR 90 MG: 90 TABLET ORAL at 21:41

## 2020-06-15 RX ADMIN — TICAGRELOR 90 MG: 90 TABLET ORAL at 09:13

## 2020-06-15 RX ADMIN — ASPIRIN 81 MG 81 MG: 81 TABLET ORAL at 09:13

## 2020-06-15 RX ADMIN — VENLAFAXINE HYDROCHLORIDE 150 MG: 150 CAPSULE, EXTENDED RELEASE ORAL at 21:41

## 2020-06-15 RX ADMIN — LEVOTHYROXINE SODIUM 75 MCG: 75 TABLET ORAL at 06:15

## 2020-06-15 RX ADMIN — SODIUM CHLORIDE 20 ML: 9 INJECTION, SOLUTION INTRAVENOUS at 17:32

## 2020-06-15 RX ADMIN — ATORVASTATIN CALCIUM 80 MG: 80 TABLET, FILM COATED ORAL at 21:41

## 2020-06-15 RX ADMIN — METOPROLOL TARTRATE 12.5 MG: 25 TABLET ORAL at 17:28

## 2020-06-15 RX ADMIN — Medication 10 ML: at 21:42

## 2020-06-15 ASSESSMENT — PAIN SCALES - GENERAL: PAINLEVEL_OUTOF10: 0

## 2020-06-15 NOTE — PROGRESS NOTES
Pharmacy Medication History Note      List of current medications patient is taking is complete. Source of information: epic fill history    Changes made to medication list:  Medications removed (include reason, ex. therapy complete or physician discontinued):  Multi-vitamin-therapy completed    Denies use of other OTC or herbal medications.       Allergies reviewed      Electronically signed by Luzma Ferguson on 6/15/2020 at 2:42 PM

## 2020-06-15 NOTE — CARE COORDINATION
6/15/20, 7:27 AM EDT  DISCHARGE PLANNING EVALUATION:    Alina Elizondochester       Admitted from: ED 6/13/2020/ Leeann 1878 day: 2   Location: [address] [place] [address]A Reason for admit: STEMI (ST elevation myocardial infarction) ([address] [I21.3] Status: IP  Admit order signed?: yes  PMH:  has no past medical history on file. Procedure: 6/13 Cardiac cath - had 1 DAVIS to left circ with significant thrombotic burden to 3 OM. Aggrastat given. Has severe LAD and RCA stenosis, needs PCI prior to discharge. 6/15 Echo to be done. 6/16 Staged PCI planned. Pertinent abnormal Imaging: None  Medications:  Scheduled Meds:   levothyroxine  75 mcg Oral QAM AC    therapeutic multivitamin-minerals  1 tablet Oral Lunch    venlafaxine  150 mg Oral Daily    metoprolol tartrate  12.5 mg Oral BID    atorvastatin  80 mg Oral Nightly    sodium chloride flush  10 mL Intravenous 2 times per day    tirofiban  25 mcg/kg Intravenous Once    ticagrelor  90 mg Oral BID    aspirin  81 mg Oral Daily    calcium replacement protocol   Other RX Placeholder    cefTRIAXone (ROCEPHIN) IV  1 g Intravenous Q24H     Continuous Infusions:     Pertinent Info/Orders/Treatment Plan:  Admitted through ED with chest pain after trimming shrubs. Taken to cath lab from ED, see results above. Needs staged PCI, planned for tomorrow. IVF. Rocephin iv daily. Hgb 10.4 and 8.9 today. Echo to be done today. Diet: DIET CARDIAC; Diet NPO, After Midnight   Smoking status:  reports that she has never smoked. She does not have any smokeless tobacco history on file. PCP: Hung Barnard.  Sandra Calzada MD  Readmission 30 days or less: No  Readmission Risk Score: 11%    Discharge Planning Evaluation  Current Residence:  Private Residence  Living Arrangements:  Spouse/Significant Other   Support Systems:  Spouse/Significant Other  Current Services PTA:     Potential Assistance Needed:  N/A  Potential Assistance Purchasing Medications:  No  Does patient want to participate in local refill/ meds

## 2020-06-15 NOTE — CONSULTS
Pt Name: Alina Elizondochester  MRN: [id number]  [id number]  YOB: 1945  Admit Date: 6/13/2020  6:14 PM  Date of evaluation: 6/15/2020  Primary Care Physician: Hung Barnard. Sandra Calzada MD   3B-25/025-A     Requesting Provider:  Mavis Pires DO    FRANK Consult    Indication:  Anemia. History:  The patient is a 69 y.o.  female admitted 6/13/20 for STEMI and was taken to cath lab with single stent LCX with other disease. She was placed on heparin and antiplatelets. Hgb has dropped and pt had \"dark but not black\" stool. FOBT+. She has a groin hematoma after her cath. Location:  anemia  Duration:  <24h  Radiation:  none  Associated:  Groin hematoma  Mitigating factors:  none  Exacerbating factors:  Brilinta    Last EGD:  never  Last Colonoscopy:  6/7/19 - no polyps, M CRC    Past Medical History:    No past medical history on file. Past Surgical History:    No past surgical history on file. Allergies:  Patient has no known allergies. Home Meds:  Prior to Admission medications    Medication Sig Start Date End Date Taking?  Authorizing Provider   venlafaxine (EFFEXOR XR) 150 MG extended release capsule Take 150 mg by mouth daily   Yes Historical Provider, MD   aspirin EC 81 MG EC tablet Take 81 mg by mouth daily   Yes Historical Provider, MD   metoprolol succinate (TOPROL XL) 50 MG extended release tablet Take 50 mg by mouth daily   Yes Historical Provider, MD   pitavastatin (LIVALO) 1 MG TABS tablet Take by mouth nightly   Yes Historical Provider, MD   Nizatidine (AXID PO) Take by mouth as needed   Yes Historical Provider, MD   levothyroxine (SYNTHROID) 75 MCG tablet Take 75 mcg by mouth Daily   Yes Historical Provider, MD      Current Meds:     Current Facility-Administered Medications   Medication Dose Route Frequency Provider Last Rate Last Dose    ondansetron (ZOFRAN) injection 4 mg  4 mg Intravenous Q6H PRN Montana Hoenig, PA        0.9 % sodium chloride bolus  20 mL Intravenous Once [employer],         levothyroxine (SYNTHROID) tablet 75 mcg  75 mcg Oral QAM AC ROB Peter CNP   75 mcg at 06/15/20 0615    therapeutic multivitamin-minerals 1 tablet  1 tablet Oral Lunch ROB Peter CNP   1 tablet at 06/14/20 1605    venlafaxine (EFFEXOR XR) extended release capsule 150 mg  150 mg Oral Daily ROB Peter CNP   150 mg at 06/14/20 2043    metoprolol tartrate (LOPRESSOR) tablet 12.5 mg  12.5 mg Oral BID Rajiv Sos, PA-C   12.5 mg at 06/14/20 1605    atorvastatin (LIPITOR) tablet 80 mg  80 mg Oral Nightly Rajiv Sos, PA-C   80 mg at 06/14/20 2043    sodium chloride flush 0.9 % injection 10 mL  10 mL Intravenous 2 times per day Ann Hendrickson MD   10 mL at 06/14/20 2045    sodium chloride flush 0.9 % injection 10 mL  10 mL Intravenous PRN Ann Hendrickson MD        acetaminophen (TYLENOL) tablet 650 mg  650 mg Oral Q4H PRN Ann Hendrickson MD        magnesium hydroxide (MILK OF MAGNESIA) 400 MG/5ML suspension 30 mL  30 mL Oral Daily PRN Ann Hendrickson MD        tirofiban (AGGRASTAT) IV bolus 1,860 mcg  25 mcg/kg Intravenous Once Ann Hendrickson MD        [facility]) tablet 90 mg  90 mg Oral BID Ann Hendrickson MD   90 mg at 06/15/20 0913    aspirin chewable tablet 81 mg  81 mg Oral Daily Ann Hendrickson MD   81 mg at 06/15/20 0913    calcium replacement protocol   Other RX Placeholder ROB Peter CNP        cefTRIAXone (ROCEPHIN) 1 g IVPB in 50 mL D5W minibag  1 g Intravenous Q24H ROB Peter CNP   Stopped at 06/15/20 0002     Social History:   Social History     Socioeconomic History    Marital status:      Spouse name: Not on file    Number of children: Not on file    Years of education: Not on file    Highest education level: Not on file   Occupational History    Not on file   Social Needs    Financial resource strain: Not

## 2020-06-16 ENCOUNTER — ANESTHESIA (OUTPATIENT)
Dept: ENDOSCOPY | Age: 75
DRG: 246 | End: 2020-06-16
Payer: MEDICARE

## 2020-06-16 ENCOUNTER — ANESTHESIA EVENT (OUTPATIENT)
Dept: ENDOSCOPY | Age: 75
DRG: 246 | End: 2020-06-16
Payer: MEDICARE

## 2020-06-16 VITALS
DIASTOLIC BLOOD PRESSURE: 69 MMHG | OXYGEN SATURATION: 92 % | RESPIRATION RATE: 16 BRPM | SYSTOLIC BLOOD PRESSURE: 141 MMHG

## 2020-06-16 LAB
ANION GAP SERPL CALCULATED.3IONS-SCNC: 10 MEQ/L (ref 8–16)
APTT: 29.7 SECONDS (ref 22–38)
BASOPHILS # BLD: 0.3 %
BASOPHILS ABSOLUTE: 0 THOU/MM3 (ref 0–0.1)
BUN BLDV-MCNC: 11 MG/DL (ref 7–22)
CALCIUM SERPL-MCNC: 8.3 MG/DL (ref 8.5–10.5)
CHLORIDE BLD-SCNC: 105 MEQ/L (ref 98–111)
CO2: 21 MEQ/L (ref 23–33)
CREAT SERPL-MCNC: 0.7 MG/DL (ref 0.4–1.2)
EKG ATRIAL RATE: 70 BPM
EKG P AXIS: 14 DEGREES
EKG P-R INTERVAL: 136 MS
EKG Q-T INTERVAL: 448 MS
EKG QRS DURATION: 74 MS
EKG QTC CALCULATION (BAZETT): 483 MS
EKG R AXIS: 55 DEGREES
EKG T AXIS: 87 DEGREES
EKG VENTRICULAR RATE: 70 BPM
EOSINOPHIL # BLD: 0.8 %
EOSINOPHILS ABSOLUTE: 0.1 THOU/MM3 (ref 0–0.4)
ERYTHROCYTE [DISTWIDTH] IN BLOOD BY AUTOMATED COUNT: 16.6 % (ref 11.5–14.5)
ERYTHROCYTE [DISTWIDTH] IN BLOOD BY AUTOMATED COUNT: 53.5 FL (ref 35–45)
GFR SERPL CREATININE-BSD FRML MDRD: 82 ML/MIN/1.73M2
GLUCOSE BLD-MCNC: 105 MG/DL (ref 70–108)
HCT VFR BLD CALC: 33.1 % (ref 37–47)
HCT VFR BLD CALC: 33.2 % (ref 37–47)
HCT VFR BLD CALC: 34.5 % (ref 37–47)
HEMOGLOBIN: 10.5 GM/DL (ref 12–16)
HEMOGLOBIN: 10.7 GM/DL (ref 12–16)
HEMOGLOBIN: 11.1 GM/DL (ref 12–16)
IMMATURE GRANS (ABS): 0.03 THOU/MM3 (ref 0–0.07)
IMMATURE GRANULOCYTES: 0.4 %
INR BLD: 1.07 (ref 0.85–1.13)
LYMPHOCYTES # BLD: 17.5 %
LYMPHOCYTES ABSOLUTE: 1.3 THOU/MM3 (ref 1–4.8)
MCH RBC QN AUTO: 28.7 PG (ref 26–33)
MCHC RBC AUTO-ENTMCNC: 32.2 GM/DL (ref 32.2–35.5)
MCV RBC AUTO: 89 FL (ref 81–99)
MONOCYTES # BLD: 8.3 %
MONOCYTES ABSOLUTE: 0.6 THOU/MM3 (ref 0.4–1.3)
NUCLEATED RED BLOOD CELLS: 0 /100 WBC
PLATELET # BLD: 169 THOU/MM3 (ref 130–400)
PMV BLD AUTO: 10.3 FL (ref 9.4–12.4)
POTASSIUM SERPL-SCNC: 3.9 MEQ/L (ref 3.5–5.2)
RBC # BLD: 3.73 MILL/MM3 (ref 4.2–5.4)
REASON FOR REJECTION: NORMAL
REJECTED TEST: NORMAL
SEG NEUTROPHILS: 72.7 %
SEGMENTED NEUTROPHILS ABSOLUTE COUNT: 5.3 THOU/MM3 (ref 1.8–7.7)
SODIUM BLD-SCNC: 136 MEQ/L (ref 135–145)
WBC # BLD: 7.3 THOU/MM3 (ref 4.8–10.8)

## 2020-06-16 PROCEDURE — 93005 ELECTROCARDIOGRAM TRACING: CPT | Performed by: PHYSICIAN ASSISTANT

## 2020-06-16 PROCEDURE — 6370000000 HC RX 637 (ALT 250 FOR IP): Performed by: PHYSICIAN ASSISTANT

## 2020-06-16 PROCEDURE — 85014 HEMATOCRIT: CPT

## 2020-06-16 PROCEDURE — 85610 PROTHROMBIN TIME: CPT

## 2020-06-16 PROCEDURE — 94760 N-INVAS EAR/PLS OXIMETRY 1: CPT

## 2020-06-16 PROCEDURE — 3700000000 HC ANESTHESIA ATTENDED CARE: Performed by: INTERNAL MEDICINE

## 2020-06-16 PROCEDURE — 2580000003 HC RX 258: Performed by: NURSE PRACTITIONER

## 2020-06-16 PROCEDURE — 2580000003 HC RX 258: Performed by: PHYSICIAN ASSISTANT

## 2020-06-16 PROCEDURE — 7100000011 HC PHASE II RECOVERY - ADDTL 15 MIN: Performed by: INTERNAL MEDICINE

## 2020-06-16 PROCEDURE — 6370000000 HC RX 637 (ALT 250 FOR IP): Performed by: NURSE PRACTITIONER

## 2020-06-16 PROCEDURE — 6370000000 HC RX 637 (ALT 250 FOR IP): Performed by: INTERNAL MEDICINE

## 2020-06-16 PROCEDURE — 7100000010 HC PHASE II RECOVERY - FIRST 15 MIN: Performed by: INTERNAL MEDICINE

## 2020-06-16 PROCEDURE — 85730 THROMBOPLASTIN TIME PARTIAL: CPT

## 2020-06-16 PROCEDURE — 3609017100 HC EGD: Performed by: INTERNAL MEDICINE

## 2020-06-16 PROCEDURE — 2500000003 HC RX 250 WO HCPCS: Performed by: NURSE ANESTHETIST, CERTIFIED REGISTERED

## 2020-06-16 PROCEDURE — 2140000000 HC CCU INTERMEDIATE R&B

## 2020-06-16 PROCEDURE — 6360000002 HC RX W HCPCS: Performed by: NURSE PRACTITIONER

## 2020-06-16 PROCEDURE — 2709999900 HC NON-CHARGEABLE SUPPLY: Performed by: INTERNAL MEDICINE

## 2020-06-16 PROCEDURE — 85018 HEMOGLOBIN: CPT

## 2020-06-16 PROCEDURE — 3700000001 HC ADD 15 MINUTES (ANESTHESIA): Performed by: INTERNAL MEDICINE

## 2020-06-16 PROCEDURE — 85025 COMPLETE CBC W/AUTO DIFF WBC: CPT

## 2020-06-16 PROCEDURE — 6360000002 HC RX W HCPCS: Performed by: NURSE ANESTHETIST, CERTIFIED REGISTERED

## 2020-06-16 PROCEDURE — 80048 BASIC METABOLIC PNL TOTAL CA: CPT

## 2020-06-16 PROCEDURE — 36415 COLL VENOUS BLD VENIPUNCTURE: CPT

## 2020-06-16 PROCEDURE — 0DJ08ZZ INSPECTION OF UPPER INTESTINAL TRACT, VIA NATURAL OR ARTIFICIAL OPENING ENDOSCOPIC: ICD-10-PCS | Performed by: INTERNAL MEDICINE

## 2020-06-16 PROCEDURE — 99232 SBSQ HOSP IP/OBS MODERATE 35: CPT | Performed by: FAMILY MEDICINE

## 2020-06-16 PROCEDURE — APPNB15 APP NON BILLABLE TIME 0-15 MINS: Performed by: PHYSICIAN ASSISTANT

## 2020-06-16 RX ORDER — SODIUM CHLORIDE 0.9 % (FLUSH) 0.9 %
10 SYRINGE (ML) INJECTION PRN
Status: DISCONTINUED | OUTPATIENT
Start: 2020-06-16 | End: 2020-06-19 | Stop reason: SDUPTHER

## 2020-06-16 RX ORDER — SODIUM CHLORIDE 450 MG/100ML
INJECTION, SOLUTION INTRAVENOUS CONTINUOUS
Status: DISCONTINUED | OUTPATIENT
Start: 2020-06-16 | End: 2020-06-18

## 2020-06-16 RX ORDER — SODIUM CHLORIDE 9 MG/ML
INJECTION, SOLUTION INTRAVENOUS CONTINUOUS
Status: DISCONTINUED | OUTPATIENT
Start: 2020-06-16 | End: 2020-06-18

## 2020-06-16 RX ORDER — SODIUM CHLORIDE 0.9 % (FLUSH) 0.9 %
10 SYRINGE (ML) INJECTION EVERY 12 HOURS SCHEDULED
Status: DISCONTINUED | OUTPATIENT
Start: 2020-06-16 | End: 2020-06-16 | Stop reason: SDUPTHER

## 2020-06-16 RX ORDER — PROPOFOL 10 MG/ML
INJECTION, EMULSION INTRAVENOUS PRN
Status: DISCONTINUED | OUTPATIENT
Start: 2020-06-16 | End: 2020-06-16 | Stop reason: SDUPTHER

## 2020-06-16 RX ORDER — LIDOCAINE HYDROCHLORIDE 20 MG/ML
INJECTION, SOLUTION INFILTRATION; PERINEURAL PRN
Status: DISCONTINUED | OUTPATIENT
Start: 2020-06-16 | End: 2020-06-16 | Stop reason: SDUPTHER

## 2020-06-16 RX ORDER — PANTOPRAZOLE SODIUM 40 MG/1
40 TABLET, DELAYED RELEASE ORAL
Status: DISCONTINUED | OUTPATIENT
Start: 2020-06-16 | End: 2020-06-20 | Stop reason: HOSPADM

## 2020-06-16 RX ORDER — SODIUM CHLORIDE 0.9 % (FLUSH) 0.9 %
10 SYRINGE (ML) INJECTION PRN
Status: DISCONTINUED | OUTPATIENT
Start: 2020-06-16 | End: 2020-06-16 | Stop reason: SDUPTHER

## 2020-06-16 RX ORDER — SODIUM CHLORIDE 0.9 % (FLUSH) 0.9 %
10 SYRINGE (ML) INJECTION EVERY 12 HOURS SCHEDULED
Status: DISCONTINUED | OUTPATIENT
Start: 2020-06-16 | End: 2020-06-19 | Stop reason: SDUPTHER

## 2020-06-16 RX ADMIN — Medication 10 ML: at 12:06

## 2020-06-16 RX ADMIN — LEVOTHYROXINE SODIUM 75 MCG: 75 TABLET ORAL at 08:37

## 2020-06-16 RX ADMIN — CEFTRIAXONE SODIUM 1 G: 1 INJECTION, POWDER, FOR SOLUTION INTRAMUSCULAR; INTRAVENOUS at 01:21

## 2020-06-16 RX ADMIN — PROPOFOL 80 MG: 10 INJECTION, EMULSION INTRAVENOUS at 14:53

## 2020-06-16 RX ADMIN — PROPOFOL 60 MG: 10 INJECTION, EMULSION INTRAVENOUS at 14:57

## 2020-06-16 RX ADMIN — ASPIRIN 81 MG 81 MG: 81 TABLET ORAL at 08:37

## 2020-06-16 RX ADMIN — LIDOCAINE HYDROCHLORIDE 80 MG: 20 INJECTION, SOLUTION INFILTRATION; PERINEURAL at 14:52

## 2020-06-16 RX ADMIN — PANTOPRAZOLE SODIUM 40 MG: 40 TABLET, DELAYED RELEASE ORAL at 08:37

## 2020-06-16 RX ADMIN — METOPROLOL TARTRATE 12.5 MG: 25 TABLET ORAL at 08:37

## 2020-06-16 RX ADMIN — METOPROLOL TARTRATE 12.5 MG: 25 TABLET ORAL at 21:00

## 2020-06-16 RX ADMIN — SODIUM CHLORIDE: 9 INJECTION, SOLUTION INTRAVENOUS at 16:53

## 2020-06-16 RX ADMIN — PANTOPRAZOLE SODIUM 40 MG: 40 TABLET, DELAYED RELEASE ORAL at 16:53

## 2020-06-16 RX ADMIN — SODIUM CHLORIDE: 4.5 INJECTION, SOLUTION INTRAVENOUS at 14:32

## 2020-06-16 RX ADMIN — VENLAFAXINE HYDROCHLORIDE 150 MG: 150 CAPSULE, EXTENDED RELEASE ORAL at 21:00

## 2020-06-16 RX ADMIN — ATORVASTATIN CALCIUM 80 MG: 80 TABLET, FILM COATED ORAL at 21:00

## 2020-06-16 RX ADMIN — PROPOFOL 60 MG: 10 INJECTION, EMULSION INTRAVENOUS at 14:54

## 2020-06-16 RX ADMIN — TICAGRELOR 90 MG: 90 TABLET ORAL at 08:37

## 2020-06-16 RX ADMIN — SODIUM CHLORIDE: 4.5 INJECTION, SOLUTION INTRAVENOUS at 12:55

## 2020-06-16 RX ADMIN — TICAGRELOR 90 MG: 90 TABLET ORAL at 21:00

## 2020-06-16 RX ADMIN — CEFTRIAXONE SODIUM 1 G: 1 INJECTION, POWDER, FOR SOLUTION INTRAMUSCULAR; INTRAVENOUS at 23:12

## 2020-06-16 ASSESSMENT — PAIN SCALES - GENERAL
PAINLEVEL_OUTOF10: 0

## 2020-06-16 ASSESSMENT — PAIN - FUNCTIONAL ASSESSMENT: PAIN_FUNCTIONAL_ASSESSMENT: 0-10

## 2020-06-16 NOTE — PROGRESS NOTES
1315/received report from Jeovany Lujan, [address] [facility] bumped to 3LO2  1318/called report to primary nurse-JULIO C Thomson updated on findings, POC and current intervention  1320/pt awke and talking pumped to 2LO2  1322/pt transport in

## 2020-06-16 NOTE — ANESTHESIA POSTPROCEDURE EVALUATION
Department of Anesthesiology  Postprocedure Note    Patient: Therese Pleitez  MRN: [id number]  YOB: 1945  Date of evaluation: 6/16/2020  Time:  3:17 PM     Procedure Summary     Date:  06/16/20 Room / Location:  [address] [place] [address]    Anesthesia Start:  3536 Anesthesia Stop:  5307    Procedure:  EGD ESOPHAGOGASTRODUODENOSCOPY (N/A ) Diagnosis:  (anemia)    Surgeon:  Gino Hodgson MD Responsible Provider:  Gerry Bello MD    Anesthesia Type:  MAC ASA Status:  3          Anesthesia Type: No value filed. Ann Phase I:      Ann Phase II:      Last vitals: Reviewed and per EMR flowsheets.        Anesthesia Post Evaluation    Patient location during evaluation: PACU  Patient participation: complete - patient participated  Level of consciousness: awake and alert  Airway patency: patent  Nausea & Vomiting: no nausea and no vomiting  Complications: no  Cardiovascular status: blood pressure returned to baseline  Respiratory status: airway suctioned  Hydration status: euvolemic

## 2020-06-16 NOTE — PROGRESS NOTES
corrected calcium 8.8; Ionized ca 1.11, mildly low   Repeat BMP tomorrow      Hypoalbuminemia  Dietician consult      Prediabetes  Glucose mildly elevated  A1c 5.6%     Hyperlipidemia  Continue statin     Essential HTN   Continue home medications      Anxiety/Depression  Continue venlafaxine         Chief Complaint:  Chest pain, urinary frequency      Date of Service: Pt seen/examined in consultation on 6/15/2020     History Of Present Illness:       69 y.o. female who we are asked to see/evaluate by Gideon Flores MD for medical management of UTI and anemia.      She originally presented to the hospital on June 13 via EMS for acute inferior STEMI. She began having substernal chest pressure, diaphoresis, nausea, chills and feeling hot. Symptoms did not improved with rest.  She has a history of hyperlipidemia, hypertension, anxiety/depression. She sees her family doctor every 6 months- in [address] [place] [address]. She states that prior to this episode of chest pain, she was having urinary frequency and urgency for a couple of days. She denies dysuria. Urinalysis was nitrite positive and urine culture grew E. coli. She was started on ceftriaxone yesterday.      She was taken to the Cath Lab on day of admission. And was found to have left circumflex STEMI with placement of 1 drug-eluting stent, significant thrombotic burden to 3 OM. She also has severe left anterior descending and RCA stenosis and will have PCI prior to discharge. She was started on dual antiplatelet therapy. Her EF was 45%. Her blood pressure was initially and she had to the ICU borderline for close monitoring. She required vasopressors for hypotension. They were discontinued on transfer to . She also had hypoxic respiratory failure with saturation 89% on June 13. She was placed on 4 L nasal cannula and that resolved on June 14. She is currently 99% on room air, blood pressure 112/61. Hemoglobin on admission was 12.3. Today it is 8.9.   She denies any history of anemia or GI bleeding.     Subjective:   Patient seen and examined in her room with her  at the bedside. She had just returned from upper endoscopy. We reviewed imaging, labs, and plan of care. Patient voiced understanding. She denies chest pain, shortness breath, abdominal pain, dysuria. Right groin hematoma is tender to touch but improving. Medications:  Reviewed    Infusion Medications    sodium chloride      sodium chloride 75 mL/hr at 06/16/20 1255     Scheduled Medications    sodium chloride flush  10 mL Intravenous 2 times per day    pantoprazole  40 mg Oral QAM AC    levothyroxine  75 mcg Oral QAM AC    therapeutic multivitamin-minerals  1 tablet Oral Lunch    venlafaxine  150 mg Oral Daily    metoprolol tartrate  12.5 mg Oral BID    atorvastatin  80 mg Oral Nightly    tirofiban  25 mcg/kg Intravenous Once    ticagrelor  90 mg Oral BID    aspirin  81 mg Oral Daily    calcium replacement protocol   Other RX Placeholder    cefTRIAXone (ROCEPHIN) IV  1 g Intravenous Q24H     PRN Meds: sodium chloride flush, ondansetron, acetaminophen, magnesium hydroxide      Intake/Output Summary (Last 24 hours) at 6/16/2020 1443  Last data filed at 6/16/2020 0115  Gross per 24 hour   Intake 1008.66 ml   Output 0 ml   Net 1008.66 ml       Diet:  Diet NPO, After Midnight  Diet NPO Time Specified  Diet NPO Time Specified    Exam:  /71   Pulse 72   Temp 97.8 °F (36.6 °C) (Oral)   Resp 18   Ht 5' (1.524 m)   Wt 171 lb 8 oz (77.8 kg)   SpO2 100%   BMI 33.49 kg/m²     General appearance: No apparent distress, appears stated age and cooperative. HEENT: Pupils equal, round, and reactive to light. Conjunctivae/corneas clear. Neck: Supple, with full range of motion. No jugular venous distention. Trachea midline. Respiratory:  Normal respiratory effort. Clear to auscultation, bilaterally without Rales/Wheezes/Rhonchi.   Cardiovascular: Regular rate and rhythm with normal

## 2020-06-16 NOTE — H&P
[address] [address] Endoscopy    Pre-Endoscopy H&PE      Patient: Akhil Jean Baptiste    : 1945    Acct#: [de-identified]  Primary Care Physician: Lucian Paredes. Mehran Childs MD   Date of evaluation: 2020    Planned Procedure:    [x]EGD    []Enteroscopy    []PEG    []PEG change    []PEG removal  []Variceal banding    []Biopsy   []Dilation      []Control of bleeding  []Destruction of lesion by [facility]    []Stent Placement  []Foreign Body Removal    []Snare Polypectomy  []Other:       []Colonoscopy  []Flex Sigmoid []EUS, rectal      []Biopsy   []Dilation      []Control of bleeding  []Destruction of lesion by [facility]    []Stent Placement  []Foreign Body Removal    []Snare Polypectomy  []Other:      Planned Sedation  []Conscious Sedation []MAC/Propofol []Anesthesia    []None    Airway:  Adequate for planned sedation    Indication:   Anemia    History:  The patient is a 69 y.o.  female admitted 20 for STEMI and was taken to cath lab with single stent LCX with other vessel disease. She was placed on heparin and antiplatelets with plan to repeat cath with more stents 20. GI consulted as pt had drop in hgb with FOBT+. She has a groin hematoma after her cath. She has received 2 U PRBC. Physical Exam:  VITALS: /71   Pulse 80   Temp 97.8 °F (36.6 °C) (Oral)   Resp 16   Ht 5' (1.524 m)   Wt 171 lb 8 oz (77.8 kg)   SpO2 97%   BMI 33.49 kg/m²   The patient is a 69 y.o.  female in no acute distress. HEAD: Normal cephalic/atraumatic. Extra-occular motions intact bilaterally. NECK: No lymphadenopathy or bruits. CHEST: Rises equally on inspiration. Clear to auscultation bilaterally. CARDIOVASCULAR: Regular rate and rhythm without murmurs, rubs or gallops. ABDOMEN: Soft, nontender, and nondistended with normal bowel sounds. No Hepatosplemomegaly. UPPER EXTREMITIES: no cyanosis, clubbing, or edema. DERM: no rash or jaundice.   LOWER EXTREMITIES: Groin hematoma on right  NEURO: Alert and
Occupational History    Not on file   Social Needs    Financial resource strain: Not on file    Food insecurity     Worry: Not on file     Inability: Not on file    Transportation needs     Medical: Not on file     Non-medical: Not on file   Tobacco Use    Smoking status: Not on file   Substance and Sexual Activity    Alcohol use: Not on file    Drug use: Not on file    Sexual activity: Not on file   Lifestyle    Physical activity     Days per week: Not on file     Minutes per session: Not on file    Stress: Not on file   Relationships    Social connections     Talks on phone: Not on file     Gets together: Not on file     Attends Christian service: Not on file     Active member of club or organization: Not on file     Attends meetings of clubs or organizations: Not on file     Relationship status: Not on file    Intimate partner violence     Fear of current or ex partner: Not on file     Emotionally abused: Not on file     Physically abused: Not on file     Forced sexual activity: Not on file   Other Topics Concern    Not on file   Social History Narrative    Not on file     ROS:   Constitutional: Denies any recent wt change. Eyes:  Denies any blurring or double vision, no glaucoma  Ears/Nose/Mouth/Throat:  Denies any chronic sinus/rhinitis, bleeding gums  Cardiovascular:  As described above. Respiratory:  Denies any frequent cough, wheezing or coughing up blood  Genitourinary:  Denies difficulty with urination and kidney stones  Gastrointestinal:  Denies any chronic problems with abdominal pain, nausea, vomiting or diarrhea  Musculoskeletal:  Denies any joint pain, back pain, or difficulty walking  Integumentary:  Denies any rash  Neurological:  No numbness or tingling  Endocrine:  Denies any polydipsia. Hematologic/Lymphatic:  Denies any hemorrhage or lymphatic drainage problems.   Labs:  CBC:   Recent Labs     06/13/20  1820   WBC 14.1*   HGB 14.5   HCT 45.7   MCV 85.7        BMP: No

## 2020-06-16 NOTE — PROGRESS NOTES
Pt Name: Illa Alpers  MRN: [id number]  [id number]  YOB: 1945  Admit Date: 6/13/2020  6:14 PM  Date of evaluation: 6/16/2020  Primary Care Physician: Heidi Elam. Binu Kellogg MD   [id number]   Dictating for Dr Viviana Colon denies abd pain, nausea,vomiting, melena, hematochezia today. EGD discussed. O  /62   Pulse 88   Temp 98 °F (36.7 °C) (Oral)   Resp 18   Ht 5' (1.524 m)   Wt 171 lb 8 oz (77.8 kg)   SpO2 99%   BMI 33.49 kg/m²   VSS, afebrile  Alert dan oriented  Resp: CTAB  Chest; RRR  Abd; soft, non-tender,non-distended with active BS's   Ext: no edema      Assessment:  Anemia, normo. Hgb 10.7 today, was 8.0 yesterday. S/p 2 units PRBC. This may be due to gross hematoma. STEMI s/p stent 6/13/20  Groin hematoma  ASA and Brilinta use  FOBT+   \"dark stool\" but not melena     Ecoli UTI  Hypoalbuminemia     Plan:  Cardiology cancelled heart cath today due to anemia and GI to evaluate prior to placing another stent that is needed. They are requesting EGD prior to this/.  Daily ppi  Follow hgb  Cont Brilinta given recent stent  EGD today with Dr Macey Ramirez at [phone], pt aware may be limited due to brilinta use and she is considered higher risk due to brilinta and cardiac status.   NPO  Follow      Assessment and plan discussed with Dr. Donny Brower, APRN, CNP, 6/16/2020, 11:37 AM  .

## 2020-06-16 NOTE — ANESTHESIA PRE PROCEDURE
1140 06/16/20 1335 06/16/20 1508   BP: 108/62 108/71 130/71 (!) 159/78   Pulse: 88 80 72 78   Resp: 18 16 18 16   Temp: 36.7 °C (98 °F) 36.6 °C (97.8 °F)     TempSrc: Oral Oral     SpO2: 99% 97% 100% 100%   Weight:       Height:                                                  BP Readings from Last 3 Encounters:   06/16/20 (!) 159/78   06/16/20 (!) 141/69       NPO Status: Time of last liquid consumption: 1000(sips water)                        Time of last solid consumption: 1800                        Date of last liquid consumption: 06/16/20                        Date of last solid food consumption: 06/15/20    BMI:   Wt Readings from Last 3 Encounters:   06/16/20 171 lb 8 oz (77.8 kg)     Body mass index is 33.49 kg/m². CBC:   Lab Results   Component Value Date    WBC 7.3 06/16/2020    RBC 3.73 06/16/2020    HGB 11.1 06/16/2020    HCT 34.5 06/16/2020    MCV 89.0 06/16/2020     06/16/2020       CMP:   Lab Results   Component Value Date     06/16/2020    K 3.9 06/16/2020    K 4.3 06/14/2020     06/16/2020    CO2 21 06/16/2020    BUN 11 06/16/2020    CREATININE 0.7 06/16/2020    LABGLOM 82 06/16/2020    GLUCOSE 105 06/16/2020    PROT 5.2 06/15/2020    CALCIUM 8.3 06/16/2020    BILITOT 0.4 06/15/2020    ALKPHOS 68 06/15/2020     06/15/2020    ALT 35 06/15/2020       POC Tests: No results for input(s): POCGLU, POCNA, POCK, POCCL, POCBUN, POCHEMO, POCHCT in the last 72 hours.     Coags:   Lab Results   Component Value Date    INR 1.07 06/16/2020    APTT 29.7 06/16/2020       HCG (If Applicable): No results found for: PREGTESTUR, PREGSERUM, HCG, HCGQUANT     ABGs: No results found for: PHART, PO2ART, JCB8BFM, WOV6YDK, BEART, [id number]     Type & Screen (If Applicable):  Lab Results   Component Value Date    LABRH POS 06/15/2020       Drug/Infectious Status (If Applicable):  No results found for: HIV, HEPCAB    COVID-19 Screening (If Applicable): No results found for: COVID19      Anesthesia

## 2020-06-16 NOTE — OP NOTE
[facility] Endoscopy    Patient: Loraine Palumbo  : 1945  Acct#: [de-identified]  Date of evaluation: 2020  Primary Care Physician: Shena aWre. Grace Bui MD     Procedure:    [x]EGD    []Enteroscopy    []Biopsy   []Dilation      []PEG    []PEG change    []PEG removal  []Variceal banding    []Control of bleeding  []Destruction of lesion by [facility]    []Stent Placement  []Foreign Body Removal    []Snare Polypectomy  []Other:     []Aborted:        []Colonoscopy  []Flex Sigmoid []EUS, rectal     []Biopsy   []Snare Polypectomy    []Control of bleeding  []Destruction of lesion by [facility]    []Stent Placement  []Foreign Body Removal    []Dilation    []Other:    []Aborted:   []Tattoo    Indication:   Anemia    History:  The patient is a 69 y.o.  female admitted 20 for STEMI and was taken to cath lab with single stent LCX with other vessel disease. She was placed on heparin and antiplatelets with plan to repeat cath with more stents 20. GI consulted as pt had drop in hgb with FOBT+. She has a groin hematoma after her cath. She has received 2 U PRBC. Physical Exam:  VITALS: /71   Pulse 80   Temp 97.8 °F (36.6 °C) (Oral)   Resp 16   Ht 5' (1.524 m)   Wt 171 lb 8 oz (77.8 kg)   SpO2 97%   BMI 33.49 kg/m²   The patient is a 69 y.o.  female in no acute distress. HEAD: Normal cephalic/atraumatic. Extra-occular motions intact bilaterally. NECK: No lymphadenopathy or bruits. CHEST: Rises equally on inspiration. Clear to auscultation bilaterally. CARDIOVASCULAR: Regular rate and rhythm without murmurs, rubs or gallops. ABDOMEN: Soft, nontender, and nondistended with normal bowel sounds. No Hepatosplemomegaly. UPPER EXTREMITIES: no cyanosis, clubbing, or edema. DERM: no rash or jaundice. LOWER EXTREMITIES: Groin hematoma on right  NEURO: Alert and oriented times four.  Patient moves all extremities and has gross sensation in all

## 2020-06-16 NOTE — PROGRESS NOTES
Photos taken, scope used GIF SER#[id number], no biopsies. EGD completed, pt tolerated fair due to low 02 saturations. CRNA placed nasal trumpet & O2 sat mirian.

## 2020-06-17 LAB
ANION GAP SERPL CALCULATED.3IONS-SCNC: 9 MEQ/L (ref 8–16)
BUN BLDV-MCNC: 12 MG/DL (ref 7–22)
CALCIUM SERPL-MCNC: 8.9 MG/DL (ref 8.5–10.5)
CHLORIDE BLD-SCNC: 107 MEQ/L (ref 98–111)
CO2: 24 MEQ/L (ref 23–33)
CREAT SERPL-MCNC: 0.7 MG/DL (ref 0.4–1.2)
GFR SERPL CREATININE-BSD FRML MDRD: 82 ML/MIN/1.73M2
GLUCOSE BLD-MCNC: 95 MG/DL (ref 70–108)
HCT VFR BLD CALC: 32.8 % (ref 37–47)
HCT VFR BLD CALC: 34.3 % (ref 37–47)
HCT VFR BLD CALC: 34.6 % (ref 37–47)
HCT VFR BLD CALC: 36.7 % (ref 37–47)
HEMOGLOBIN: 10.6 GM/DL (ref 12–16)
HEMOGLOBIN: 10.9 GM/DL (ref 12–16)
HEMOGLOBIN: 11.1 GM/DL (ref 12–16)
HEMOGLOBIN: 11.7 GM/DL (ref 12–16)
MAGNESIUM: 2.1 MG/DL (ref 1.6–2.4)
POTASSIUM SERPL-SCNC: 5 MEQ/L (ref 3.5–5.2)
SODIUM BLD-SCNC: 140 MEQ/L (ref 135–145)

## 2020-06-17 PROCEDURE — 2140000000 HC CCU INTERMEDIATE R&B

## 2020-06-17 PROCEDURE — 2580000003 HC RX 258: Performed by: NURSE PRACTITIONER

## 2020-06-17 PROCEDURE — 6370000000 HC RX 637 (ALT 250 FOR IP): Performed by: PHYSICIAN ASSISTANT

## 2020-06-17 PROCEDURE — 85018 HEMOGLOBIN: CPT

## 2020-06-17 PROCEDURE — 94760 N-INVAS EAR/PLS OXIMETRY 1: CPT

## 2020-06-17 PROCEDURE — 6370000000 HC RX 637 (ALT 250 FOR IP): Performed by: NURSE PRACTITIONER

## 2020-06-17 PROCEDURE — 36415 COLL VENOUS BLD VENIPUNCTURE: CPT

## 2020-06-17 PROCEDURE — 83735 ASSAY OF MAGNESIUM: CPT

## 2020-06-17 PROCEDURE — 6370000000 HC RX 637 (ALT 250 FOR IP): Performed by: FAMILY MEDICINE

## 2020-06-17 PROCEDURE — 85014 HEMATOCRIT: CPT

## 2020-06-17 PROCEDURE — 99232 SBSQ HOSP IP/OBS MODERATE 35: CPT | Performed by: NURSE PRACTITIONER

## 2020-06-17 PROCEDURE — 80048 BASIC METABOLIC PNL TOTAL CA: CPT

## 2020-06-17 PROCEDURE — 99232 SBSQ HOSP IP/OBS MODERATE 35: CPT | Performed by: FAMILY MEDICINE

## 2020-06-17 PROCEDURE — 6370000000 HC RX 637 (ALT 250 FOR IP): Performed by: INTERNAL MEDICINE

## 2020-06-17 PROCEDURE — 2580000003 HC RX 258: Performed by: PHYSICIAN ASSISTANT

## 2020-06-17 RX ORDER — PANTOPRAZOLE SODIUM 40 MG/1
40 TABLET, DELAYED RELEASE ORAL
Qty: 60 TABLET | Refills: 3 | Status: SHIPPED | OUTPATIENT
Start: 2020-06-17 | End: 2020-06-30 | Stop reason: SDUPTHER

## 2020-06-17 RX ORDER — AMOXICILLIN AND CLAVULANATE POTASSIUM 500; 125 MG/1; MG/1
1 TABLET, FILM COATED ORAL EVERY 12 HOURS SCHEDULED
Status: COMPLETED | OUTPATIENT
Start: 2020-06-17 | End: 2020-06-18

## 2020-06-17 RX ORDER — POTASSIUM CHLORIDE 20 MEQ/1
40 TABLET, EXTENDED RELEASE ORAL PRN
Status: DISCONTINUED | OUTPATIENT
Start: 2020-06-17 | End: 2020-06-20 | Stop reason: HOSPADM

## 2020-06-17 RX ORDER — POTASSIUM CHLORIDE 7.45 MG/ML
10 INJECTION INTRAVENOUS PRN
Status: DISCONTINUED | OUTPATIENT
Start: 2020-06-17 | End: 2020-06-20 | Stop reason: HOSPADM

## 2020-06-17 RX ADMIN — Medication 10 ML: at 09:38

## 2020-06-17 RX ADMIN — LEVOTHYROXINE SODIUM 75 MCG: 75 TABLET ORAL at 06:58

## 2020-06-17 RX ADMIN — AMOXICILLIN AND CLAVULANATE POTASSIUM 1 TABLET: 500; 125 TABLET, FILM COATED ORAL at 21:47

## 2020-06-17 RX ADMIN — METOPROLOL TARTRATE 12.5 MG: 25 TABLET ORAL at 09:37

## 2020-06-17 RX ADMIN — PANTOPRAZOLE SODIUM 40 MG: 40 TABLET, DELAYED RELEASE ORAL at 06:58

## 2020-06-17 RX ADMIN — Medication 10 ML: at 21:49

## 2020-06-17 RX ADMIN — TICAGRELOR 90 MG: 90 TABLET ORAL at 09:37

## 2020-06-17 RX ADMIN — METOPROLOL TARTRATE 12.5 MG: 25 TABLET ORAL at 21:48

## 2020-06-17 RX ADMIN — VENLAFAXINE HYDROCHLORIDE 150 MG: 150 CAPSULE, EXTENDED RELEASE ORAL at 21:47

## 2020-06-17 RX ADMIN — MULTIPLE VITAMINS W/ MINERALS TAB 1 TABLET: TAB at 13:40

## 2020-06-17 RX ADMIN — AMOXICILLIN AND CLAVULANATE POTASSIUM 1 TABLET: 500; 125 TABLET, FILM COATED ORAL at 09:37

## 2020-06-17 RX ADMIN — PANTOPRAZOLE SODIUM 40 MG: 40 TABLET, DELAYED RELEASE ORAL at 16:54

## 2020-06-17 RX ADMIN — ASPIRIN 81 MG 81 MG: 81 TABLET ORAL at 09:37

## 2020-06-17 RX ADMIN — SODIUM CHLORIDE: 9 INJECTION, SOLUTION INTRAVENOUS at 09:31

## 2020-06-17 RX ADMIN — ATORVASTATIN CALCIUM 80 MG: 80 TABLET, FILM COATED ORAL at 21:47

## 2020-06-17 RX ADMIN — TICAGRELOR 90 MG: 90 TABLET ORAL at 21:47

## 2020-06-17 ASSESSMENT — PAIN SCALES - GENERAL
PAINLEVEL_OUTOF10: 0

## 2020-06-17 NOTE — PROCEDURES
amendable to PCI, would likely be improved with  just angio, Aggrastat bolus and drip. Thereafter, all equipments were removed from the patient. The patient  tolerated the procedure well. She remained quite hypotensive. I was concerned that she was developing  and progressing a cardiogenic shock. She had basically triple-vessel  disease. We then obtained right femoral venous access. Using  micropuncture, modified Seldinger technique under fluoroscopic guidance  and ultrasound guidance, I then floated 5-Spanish Lacy balloon-tipped  wedge catheter into the right heart and checked pressures and cardiac  output. RA 8, RV 41/9, PA 37/17 with a mean of 25, PA saturation is 59%. Given the findings, it appeared that she was slightly hypovolemic. I  did cross into the LV gram.  The LV systolic pressure is 86. No  significant LV to AO systolic gradient. LVEDP of 15 to 20. LVEF  appeared to be about 45% to 50% with some mild inferior wall  hypokinesis. Aortic valve was tricuspid. No significant aneurysm or  dissection in the visualized portion of the aorta. PERIPHERAL ANGIOGRAM:  Given the fact that she has reduced cardiac index  by Tawnya calculation; however, this did not fit with the clinical  picture. I did take a peripheral angiogram after inserting the pigtail  catheter into the abdominal aorta to ensure that she had appropriate  iliofemoral access for any hemodynamic support if necessary. PERIPHERAL ANGIOGRAM:  ABDOMINAL AORTA:  Patent. COMMON ILIAC ARTERIES:  Bilaterally patent. EXTERNAL ILIAC ARTERIES:  Bilaterally patent. COMMON FEMORAL ARTERIES:  Bilaterally patent. IMMEDIATE COMPLICATIONS:  None. MEDICATIONS:  See EMR. ACCESS:  Right femoral artery sheath was removed. A 6-Spanish Angio-Seal  was used to hemostasis. The right femoral vein sheath was left in  place for intravenous access.     SUMMARY:  Successful left circumflex STEMI PCI x1 DAVIS; severe residual  LAD and

## 2020-06-17 NOTE — PROGRESS NOTES
Stopped (06/16/20 1652)     Scheduled Medications    amoxicillin-clavulanate  1 tablet Oral 2 times per day    sodium chloride flush  10 mL Intravenous 2 times per day    pantoprazole  40 mg Oral BID AC    levothyroxine  75 mcg Oral QAM AC    therapeutic multivitamin-minerals  1 tablet Oral Lunch    venlafaxine  150 mg Oral Daily    metoprolol tartrate  12.5 mg Oral BID    atorvastatin  80 mg Oral Nightly    tirofiban  25 mcg/kg Intravenous Once    ticagrelor  90 mg Oral BID    aspirin  81 mg Oral Daily    calcium replacement protocol   Other RX Placeholder     PRN Meds: potassium chloride **OR** potassium alternative oral replacement **OR** potassium chloride, sodium chloride flush, ondansetron, acetaminophen, magnesium hydroxide      Intake/Output Summary (Last 24 hours) at 6/17/2020 0737  Last data filed at 6/17/2020 0332  Gross per 24 hour   Intake 946.26 ml   Output 0 ml   Net 946.26 ml       Diet:  Diet NPO Time Specified    Exam:  /60   Pulse 80   Temp 98.4 °F (36.9 °C) (Oral)   Resp 18   Ht 5' (1.524 m)   Wt 168 lb (76.2 kg)   SpO2 96%   BMI 32.81 kg/m²     General appearance: alert, not in acute distress. HEENT: Pupils equal, round, and reactive to light. Conjunctivae clear. Clear oral mucosa. Neck: Supple, with full range of motion. No jugular venous distention. Trachea midline. Respiratory:  Normal respiratory effort. Clear to auscultation, bilaterally without Rales/Wheezes/Rhonchi. Cardiovascular: normal rate, regular rhythm with normal S1/S2 without murmurs, rubs or gallops. Abdomen: Soft, non-tender, non-distended with normal bowel sounds. (-) CVA tenderness   Musculoskeletal: passive and active ROM x 4 extremities.   Exam of extremities: no pedal edema, trace pitting edema on distal legs, no clubbing or cyanosis  Skin: (+) bruise on anterior aspect of right thigh and right groin      Labs:   Recent Labs     06/15/20  0256  06/16/20  0332 06/16/20  1443 06/16/20 2050 Baseline 14 - Hgb 12.3 on admission, downtrending since heart catheterization  Cardiology ordered US of groin- (+) hematoma   S/p 2u PRBC 6/15; Hgb improved to 11.7 today from 10.9. MCV 91; increased RDW; Iron low, low normal ferritin, normal TIBC, B12, and folate   H&H q6, FOBT (+)   Keep Hgb >8, transfuse if <8   EGD per GI 6/16/2020 -- hiatal hernia, diffuse gastritis, carol erosions with scattered small ulcers  - PPI BID, repeat EGD in 6-8 weeks      E. Coli UTI     WBC 10.9 on admission, now resolved, afebrile    Urine nitrite positive, urine culture E. Coli.  Having urinary frequency and urgency   Ceftriaxone x 4 doses so far, switch to Augmentin as ordered for 3 more doses to complete 7-day course.     CAD    Severe LAD and RCA stenosis; Staged PCI on 6/16  Echo on 6/15/2020 showed EF of 60%, grade 1 diastolic dysfunction, left atrium mildly dilated, moderately hypokinetic motion of the lateral wall noted in the left ventricle, regional wall motion abnormalities  Continue ASA, Statin, Brilinta, beta blocker      Grade 1 diastolic dysfunction, EF 93%    -Noted per echo 6/15/2020       Acute hypoxic respiratory failure, resolved      Was requiring 3L NC; on room air saturating 99% RA     Hypotension, resolved    -Continue to monitor vital signs     Hiatal hernia, moderate sized    Noted on CXR, hemodynamically stable       Non anion gap metabolic acidosis, resolved    Continue IV fluids gently  BMP in a.m.     Hypocalcemia resolved       Hypoalbuminemia    Dietician consult      Hyperglycemia, resolved    A1c 5.6%  Follow-up with PCP on discharge     Hyperlipidemia    Continue statin     Essential HTN, controlled    Continue home medications      History of anxiety/Depression    Continue venlafaxine       Anticipated Discharge in : Per primary team      Diet: Diet NPO Time Specified    DVT prophylaxis: [] Lovenox                                 [x] SCDs                                 [] SQ Heparin [] Encourage ambulation           [] Already on Anticoagulation     Disposition:    [] Home       [] TCU       [] Rehab       [] Psych       [] SNF       [] [place]       [x] Other-Per primary team. Hgb is stable. EGD and GI recommendation reviewed. Switched rocephin to augmentin as ordered for UTI. Will see as needed.        Code Status: Full Code      Electronically signed by Dominique Velazquez MD on 6/17/2020 at 7:37 AM

## 2020-06-18 ENCOUNTER — PREP FOR PROCEDURE (OUTPATIENT)
Dept: CARDIOLOGY | Age: 75
End: 2020-06-18

## 2020-06-18 LAB
HCT VFR BLD CALC: 33.1 % (ref 37–47)
HCT VFR BLD CALC: 33.9 % (ref 37–47)
HCT VFR BLD CALC: 36.1 % (ref 37–47)
HCT VFR BLD CALC: 36.3 % (ref 37–47)
HEMOGLOBIN: 10.6 GM/DL (ref 12–16)
HEMOGLOBIN: 10.9 GM/DL (ref 12–16)
HEMOGLOBIN: 11.4 GM/DL (ref 12–16)
HEMOGLOBIN: 11.5 GM/DL (ref 12–16)

## 2020-06-18 PROCEDURE — 6370000000 HC RX 637 (ALT 250 FOR IP): Performed by: NURSE PRACTITIONER

## 2020-06-18 PROCEDURE — 2580000003 HC RX 258: Performed by: PHYSICIAN ASSISTANT

## 2020-06-18 PROCEDURE — 6370000000 HC RX 637 (ALT 250 FOR IP): Performed by: PHYSICIAN ASSISTANT

## 2020-06-18 PROCEDURE — 99232 SBSQ HOSP IP/OBS MODERATE 35: CPT | Performed by: NURSE PRACTITIONER

## 2020-06-18 PROCEDURE — 6370000000 HC RX 637 (ALT 250 FOR IP): Performed by: INTERNAL MEDICINE

## 2020-06-18 PROCEDURE — 2140000000 HC CCU INTERMEDIATE R&B

## 2020-06-18 PROCEDURE — 6370000000 HC RX 637 (ALT 250 FOR IP): Performed by: FAMILY MEDICINE

## 2020-06-18 PROCEDURE — 2580000003 HC RX 258: Performed by: NURSE PRACTITIONER

## 2020-06-18 PROCEDURE — 36415 COLL VENOUS BLD VENIPUNCTURE: CPT

## 2020-06-18 PROCEDURE — 85018 HEMOGLOBIN: CPT

## 2020-06-18 PROCEDURE — 85014 HEMATOCRIT: CPT

## 2020-06-18 RX ORDER — ASPIRIN 325 MG
325 TABLET ORAL ONCE
Status: CANCELLED | OUTPATIENT
Start: 2020-06-19

## 2020-06-18 RX ORDER — SODIUM CHLORIDE 9 MG/ML
INJECTION, SOLUTION INTRAVENOUS CONTINUOUS
Status: CANCELLED | OUTPATIENT
Start: 2020-06-19

## 2020-06-18 RX ORDER — SODIUM CHLORIDE 0.9 % (FLUSH) 0.9 %
10 SYRINGE (ML) INJECTION EVERY 12 HOURS SCHEDULED
Status: CANCELLED | OUTPATIENT
Start: 2020-06-18

## 2020-06-18 RX ORDER — SODIUM CHLORIDE 0.9 % (FLUSH) 0.9 %
10 SYRINGE (ML) INJECTION PRN
Status: CANCELLED | OUTPATIENT
Start: 2020-06-18

## 2020-06-18 RX ADMIN — MULTIPLE VITAMINS W/ MINERALS TAB 1 TABLET: TAB at 13:19

## 2020-06-18 RX ADMIN — ASPIRIN 81 MG 81 MG: 81 TABLET ORAL at 09:12

## 2020-06-18 RX ADMIN — METOPROLOL TARTRATE 25 MG: 25 TABLET ORAL at 21:29

## 2020-06-18 RX ADMIN — ATORVASTATIN CALCIUM 80 MG: 80 TABLET, FILM COATED ORAL at 21:29

## 2020-06-18 RX ADMIN — LEVOTHYROXINE SODIUM 75 MCG: 75 TABLET ORAL at 06:09

## 2020-06-18 RX ADMIN — PANTOPRAZOLE SODIUM 40 MG: 40 TABLET, DELAYED RELEASE ORAL at 19:04

## 2020-06-18 RX ADMIN — METOPROLOL TARTRATE 12.5 MG: 25 TABLET ORAL at 09:12

## 2020-06-18 RX ADMIN — AMOXICILLIN AND CLAVULANATE POTASSIUM 1 TABLET: 500; 125 TABLET, FILM COATED ORAL at 09:12

## 2020-06-18 RX ADMIN — TICAGRELOR 90 MG: 90 TABLET ORAL at 09:12

## 2020-06-18 RX ADMIN — VENLAFAXINE HYDROCHLORIDE 150 MG: 150 CAPSULE, EXTENDED RELEASE ORAL at 21:29

## 2020-06-18 RX ADMIN — SODIUM CHLORIDE: 9 INJECTION, SOLUTION INTRAVENOUS at 02:36

## 2020-06-18 RX ADMIN — TICAGRELOR 90 MG: 90 TABLET ORAL at 21:29

## 2020-06-18 RX ADMIN — PANTOPRAZOLE SODIUM 40 MG: 40 TABLET, DELAYED RELEASE ORAL at 06:09

## 2020-06-18 RX ADMIN — Medication 10 ML: at 21:29

## 2020-06-18 ASSESSMENT — PAIN SCALES - GENERAL: PAINLEVEL_OUTOF10: 0

## 2020-06-18 NOTE — PROGRESS NOTES
range of motion, no joint swelling, deformity or tenderness  Neurological: alert, oriented, normal speech, no focal findings or movement disorder noted    Medications:    sodium chloride flush  10 mL Intravenous 2 times per day    pantoprazole  40 mg Oral BID AC    levothyroxine  75 mcg Oral QAM AC    therapeutic multivitamin-minerals  1 tablet Oral Lunch    venlafaxine  150 mg Oral Daily    metoprolol tartrate  12.5 mg Oral BID    atorvastatin  80 mg Oral Nightly    tirofiban  25 mcg/kg Intravenous Once    ticagrelor  90 mg Oral BID    aspirin  81 mg Oral Daily    calcium replacement protocol   Other RX Placeholder      sodium chloride 75 mL/hr at 20 0236    sodium chloride Stopped (20 1652)     potassium chloride, 40 mEq, PRN    Or  potassium alternative oral replacement, 40 mEq, PRN    Or  potassium chloride, 10 mEq, PRN  sodium chloride flush, 10 mL, PRN  ondansetron, 4 mg, Q6H PRN  acetaminophen, 650 mg, Q4H PRN  magnesium hydroxide, 30 mL, Daily PRN        Diagnostics:  EK2020 10:27:16 [facility] ROUTINE RETRIEVAL  Normal sinus rhythm  Low voltage QRS, consider pulmonary disease, pericardial effusion, or normal variant  Nonspecific ST and T wave abnormality  Prolonged QT interval or tu fusion, consider myocardial disease, electrolyte imbalance, or drug effects  Abnormal ECG  When compared with ECG of 2020 22:40,  Premature ventricular complexes are no longer Present  ST no longer elevated in Lateral leads  Nonspecific T wave abnormality is worse in Anterolateral leads  Confirmed by Fanny Aponte ([id number]) on 2020 5:13:37 PM    Echo:    Electronically signed by Joel Dyer MD (Interpreting   physician) on 06/15/2020 at 07:24 PM   ----------------------------------------------------------------      Findings      Mitral Valve   The mitral valve structure was normal with normal leaflet separation.    DOPPLER: The transmitral velocity was within the

## 2020-06-18 NOTE — PLAN OF CARE
Goal:  Aerobic endurance goal to be measured in minutes. Start endurance training per patient tolerance at 1-8 minutes per exercise type, progressing to a total of 31-60 minutes using various modes of training (see Exercise Prescription). Progression:    [x] Weekly 5-10% intensity progression, as tolerated, during cardiac rehab sessions. [x] 13-17 Rate of Perceived Exertion on the Chelle Scale (6-20). Measurable Muscular Strength Goal:  Starting at 1-5 lbs x 8 reps, progressing to 5-25 lbs x 15 reps per patient tolerance.       Electronically signed by Josette Martinez on 6/18/2020 at 9:32 AM    Exercise Physiologist/Date/Time

## 2020-06-19 ENCOUNTER — APPOINTMENT (OUTPATIENT)
Dept: CARDIAC CATH/INVASIVE PROCEDURES | Age: 75
End: 2020-06-19
Payer: MEDICARE

## 2020-06-19 LAB
ABO: NORMAL
ACTIVATED CLOTTING TIME: 439 SECONDS (ref 1–150)
ALBUMIN SERPL-MCNC: 3.3 G/DL (ref 3.5–5.1)
ALP BLD-CCNC: 98 U/L (ref 38–126)
ALT SERPL-CCNC: 26 U/L (ref 11–66)
ANION GAP SERPL CALCULATED.3IONS-SCNC: 12 MEQ/L (ref 8–16)
ANION GAP SERPL CALCULATED.3IONS-SCNC: 13 MEQ/L (ref 8–16)
ANTIBODY SCREEN: NORMAL
APTT: 31.8 SECONDS (ref 22–38)
AST SERPL-CCNC: 44 U/L (ref 5–40)
BILIRUB SERPL-MCNC: 2 MG/DL (ref 0.3–1.2)
BUN BLDV-MCNC: 11 MG/DL (ref 7–22)
BUN BLDV-MCNC: 11 MG/DL (ref 7–22)
CALCIUM SERPL-MCNC: 8.8 MG/DL (ref 8.5–10.5)
CALCIUM SERPL-MCNC: 9.1 MG/DL (ref 8.5–10.5)
CHLORIDE BLD-SCNC: 102 MEQ/L (ref 98–111)
CHLORIDE BLD-SCNC: 99 MEQ/L (ref 98–111)
CO2: 20 MEQ/L (ref 23–33)
CO2: 21 MEQ/L (ref 23–33)
CREAT SERPL-MCNC: 0.7 MG/DL (ref 0.4–1.2)
CREAT SERPL-MCNC: 0.7 MG/DL (ref 0.4–1.2)
EKG ATRIAL RATE: 85 BPM
EKG P AXIS: 12 DEGREES
EKG P-R INTERVAL: 140 MS
EKG Q-T INTERVAL: 370 MS
EKG QRS DURATION: 72 MS
EKG QTC CALCULATION (BAZETT): 440 MS
EKG R AXIS: 69 DEGREES
EKG T AXIS: 32 DEGREES
EKG VENTRICULAR RATE: 85 BPM
ERYTHROCYTE [DISTWIDTH] IN BLOOD BY AUTOMATED COUNT: 17.3 % (ref 11.5–14.5)
ERYTHROCYTE [DISTWIDTH] IN BLOOD BY AUTOMATED COUNT: 55.5 FL (ref 35–45)
GFR SERPL CREATININE-BSD FRML MDRD: 82 ML/MIN/1.73M2
GFR SERPL CREATININE-BSD FRML MDRD: 82 ML/MIN/1.73M2
GLUCOSE BLD-MCNC: 108 MG/DL (ref 70–108)
GLUCOSE BLD-MCNC: 118 MG/DL (ref 70–108)
HCT VFR BLD CALC: 36.3 % (ref 37–47)
HCT VFR BLD CALC: 37.5 % (ref 37–47)
HEMOGLOBIN: 11.6 GM/DL (ref 12–16)
HEMOGLOBIN: 12 GM/DL (ref 12–16)
INR BLD: 1.14 (ref 0.85–1.13)
MCH RBC QN AUTO: 28.9 PG (ref 26–33)
MCHC RBC AUTO-ENTMCNC: 32 GM/DL (ref 32.2–35.5)
MCV RBC AUTO: 90.5 FL (ref 81–99)
PLATELET # BLD: 261 THOU/MM3 (ref 130–400)
PMV BLD AUTO: 10.3 FL (ref 9.4–12.4)
POTASSIUM REFLEX MAGNESIUM: 4.1 MEQ/L (ref 3.5–5.2)
POTASSIUM REFLEX MAGNESIUM: 4.5 MEQ/L (ref 3.5–5.2)
RBC # BLD: 4.01 MILL/MM3 (ref 4.2–5.4)
RH FACTOR: NORMAL
SODIUM BLD-SCNC: 132 MEQ/L (ref 135–145)
SODIUM BLD-SCNC: 135 MEQ/L (ref 135–145)
TOTAL PROTEIN: 6.7 G/DL (ref 6.1–8)
WBC # BLD: 7.8 THOU/MM3 (ref 4.8–10.8)

## 2020-06-19 PROCEDURE — 2580000003 HC RX 258: Performed by: NURSE PRACTITIONER

## 2020-06-19 PROCEDURE — C1874 STENT, COATED/COV W/DEL SYS: HCPCS

## 2020-06-19 PROCEDURE — 85347 COAGULATION TIME ACTIVATED: CPT

## 2020-06-19 PROCEDURE — 85018 HEMOGLOBIN: CPT

## 2020-06-19 PROCEDURE — 85014 HEMATOCRIT: CPT

## 2020-06-19 PROCEDURE — 2709999900 HC NON-CHARGEABLE SUPPLY

## 2020-06-19 PROCEDURE — 36415 COLL VENOUS BLD VENIPUNCTURE: CPT

## 2020-06-19 PROCEDURE — 85027 COMPLETE CBC AUTOMATED: CPT

## 2020-06-19 PROCEDURE — 6370000000 HC RX 637 (ALT 250 FOR IP): Performed by: NURSE PRACTITIONER

## 2020-06-19 PROCEDURE — 80053 COMPREHEN METABOLIC PANEL: CPT

## 2020-06-19 PROCEDURE — 86850 RBC ANTIBODY SCREEN: CPT

## 2020-06-19 PROCEDURE — 86901 BLOOD TYPING SEROLOGIC RH(D): CPT

## 2020-06-19 PROCEDURE — 4A023N8 MEASUREMENT OF CARDIAC SAMPLING AND PRESSURE, BILATERAL, PERCUTANEOUS APPROACH: ICD-10-PCS | Performed by: INTERNAL MEDICINE

## 2020-06-19 PROCEDURE — 86900 BLOOD TYPING SEROLOGIC ABO: CPT

## 2020-06-19 PROCEDURE — 93005 ELECTROCARDIOGRAM TRACING: CPT | Performed by: NURSE PRACTITIONER

## 2020-06-19 PROCEDURE — 6360000004 HC RX CONTRAST MEDICATION: Performed by: INTERNAL MEDICINE

## 2020-06-19 PROCEDURE — 6360000002 HC RX W HCPCS

## 2020-06-19 PROCEDURE — 94760 N-INVAS EAR/PLS OXIMETRY 1: CPT

## 2020-06-19 PROCEDURE — 92928 PRQ TCAT PLMT NTRAC ST 1 LES: CPT | Performed by: INTERNAL MEDICINE

## 2020-06-19 PROCEDURE — 85730 THROMBOPLASTIN TIME PARTIAL: CPT

## 2020-06-19 PROCEDURE — 6370000000 HC RX 637 (ALT 250 FOR IP): Performed by: INTERNAL MEDICINE

## 2020-06-19 PROCEDURE — C1894 INTRO/SHEATH, NON-LASER: HCPCS

## 2020-06-19 PROCEDURE — 85610 PROTHROMBIN TIME: CPT

## 2020-06-19 PROCEDURE — C9600 PERC DRUG-EL COR STENT SING: HCPCS | Performed by: INTERNAL MEDICINE

## 2020-06-19 PROCEDURE — 2140000000 HC CCU INTERMEDIATE R&B

## 2020-06-19 PROCEDURE — C1725 CATH, TRANSLUMIN NON-LASER: HCPCS

## 2020-06-19 PROCEDURE — C1769 GUIDE WIRE: HCPCS

## 2020-06-19 PROCEDURE — 6370000000 HC RX 637 (ALT 250 FOR IP)

## 2020-06-19 PROCEDURE — 2500000003 HC RX 250 WO HCPCS

## 2020-06-19 PROCEDURE — 6370000000 HC RX 637 (ALT 250 FOR IP): Performed by: PHYSICIAN ASSISTANT

## 2020-06-19 PROCEDURE — C1887 CATHETER, GUIDING: HCPCS

## 2020-06-19 PROCEDURE — 99232 SBSQ HOSP IP/OBS MODERATE 35: CPT | Performed by: NURSE PRACTITIONER

## 2020-06-19 RX ORDER — SODIUM CHLORIDE 0.9 % (FLUSH) 0.9 %
10 SYRINGE (ML) INJECTION PRN
Status: DISCONTINUED | OUTPATIENT
Start: 2020-06-19 | End: 2020-06-19 | Stop reason: SDUPTHER

## 2020-06-19 RX ORDER — SODIUM CHLORIDE 0.9 % (FLUSH) 0.9 %
10 SYRINGE (ML) INJECTION PRN
Status: DISCONTINUED | OUTPATIENT
Start: 2020-06-19 | End: 2020-06-20 | Stop reason: HOSPADM

## 2020-06-19 RX ORDER — ACETAMINOPHEN 325 MG/1
650 TABLET ORAL EVERY 4 HOURS PRN
Status: DISCONTINUED | OUTPATIENT
Start: 2020-06-19 | End: 2020-06-20 | Stop reason: HOSPADM

## 2020-06-19 RX ORDER — SODIUM CHLORIDE 0.9 % (FLUSH) 0.9 %
10 SYRINGE (ML) INJECTION EVERY 12 HOURS SCHEDULED
Status: DISCONTINUED | OUTPATIENT
Start: 2020-06-19 | End: 2020-06-19 | Stop reason: SDUPTHER

## 2020-06-19 RX ORDER — ASPIRIN 325 MG
325 TABLET ORAL ONCE
Status: DISCONTINUED | OUTPATIENT
Start: 2020-06-19 | End: 2020-06-20 | Stop reason: HOSPADM

## 2020-06-19 RX ORDER — SODIUM CHLORIDE 0.9 % (FLUSH) 0.9 %
10 SYRINGE (ML) INJECTION EVERY 12 HOURS SCHEDULED
Status: DISCONTINUED | OUTPATIENT
Start: 2020-06-19 | End: 2020-06-20 | Stop reason: HOSPADM

## 2020-06-19 RX ORDER — SODIUM CHLORIDE 9 MG/ML
INJECTION, SOLUTION INTRAVENOUS CONTINUOUS
Status: DISCONTINUED | OUTPATIENT
Start: 2020-06-19 | End: 2020-06-20

## 2020-06-19 RX ADMIN — ATORVASTATIN CALCIUM 80 MG: 80 TABLET, FILM COATED ORAL at 20:46

## 2020-06-19 RX ADMIN — SODIUM CHLORIDE: 9 INJECTION, SOLUTION INTRAVENOUS at 13:04

## 2020-06-19 RX ADMIN — PANTOPRAZOLE SODIUM 40 MG: 40 TABLET, DELAYED RELEASE ORAL at 06:01

## 2020-06-19 RX ADMIN — TICAGRELOR 90 MG: 90 TABLET ORAL at 08:23

## 2020-06-19 RX ADMIN — METOPROLOL TARTRATE 25 MG: 25 TABLET ORAL at 08:23

## 2020-06-19 RX ADMIN — PANTOPRAZOLE SODIUM 40 MG: 40 TABLET, DELAYED RELEASE ORAL at 16:45

## 2020-06-19 RX ADMIN — VENLAFAXINE HYDROCHLORIDE 150 MG: 150 CAPSULE, EXTENDED RELEASE ORAL at 20:46

## 2020-06-19 RX ADMIN — IOPAMIDOL 140 ML: 755 INJECTION, SOLUTION INTRAVENOUS at 16:22

## 2020-06-19 RX ADMIN — ASPIRIN 81 MG 81 MG: 81 TABLET ORAL at 08:23

## 2020-06-19 RX ADMIN — LEVOTHYROXINE SODIUM 75 MCG: 75 TABLET ORAL at 06:01

## 2020-06-19 RX ADMIN — METOPROLOL TARTRATE 25 MG: 25 TABLET ORAL at 20:46

## 2020-06-19 RX ADMIN — TICAGRELOR 90 MG: 90 TABLET ORAL at 20:46

## 2020-06-19 ASSESSMENT — PAIN SCALES - GENERAL: PAINLEVEL_OUTOF10: 0

## 2020-06-19 NOTE — BRIEF OP NOTE
[facility]  Sedation/Analgesia Post Sedation Record    Pt Name: Heide Houser  Account number: [de-identified]  MRN: [id number]  YOB: 1945  Procedure Performed By: Esau Orozco, [address]  Primary Care Physician: Carmela Urbina MD  Date: 6/19/2020    POST-PROCEDURE    Physicians/Assistants: KESHIA Childs MD    Procedure Performed:Cath/ PCI      Sedation/Anesthesia: Versed/ Fentanyl and 2% xylocaine local anesthesia. Estimated Blood Loss: < 50 ml. Specimens Removed: None         Disposition of Specimen: N/A        Complications: No Immediate Complications.        Post-procedure Diagnosis/Findings:     PCI of the LAD x 1 DAVIS  PCI of the RCA x 3 DAVIS               KESHIA Childs MD  Electronically signed 6/19/2020 at 4:13 PM  Interventional Cardiology

## 2020-06-19 NOTE — PRE SEDATION
injection 4 mg, 4 mg, Intravenous, Q6H PRN, IFEANYI Roth    levothyroxine (SYNTHROID) tablet 75 mcg, 75 mcg, Oral, QAM AC, Christin Candance Spare, APRN - CNP, 75 mcg at 06/19/20 0601    therapeutic multivitamin-minerals 1 tablet, 1 tablet, Oral, Lunch, Christin Candance Spare, APRN - CNP, 1 tablet at 06/18/20 1319    venlafaxine (EFFEXOR XR) extended release capsule 150 mg, 150 mg, Oral, Daily, Christin Candance Spare, APRN - CNP, 150 mg at 06/18/20 2129    atorvastatin (LIPITOR) tablet 80 mg, 80 mg, Oral, Nightly, Jose Cruz Davalos PA-C, 80 mg at 06/18/20 2129    acetaminophen (TYLENOL) tablet 650 mg, 650 mg, Oral, Q4H PRN, Jaylene Casanova MD    magnesium hydroxide (MILK OF MAGNESIA) 400 MG/5ML suspension 30 mL, 30 mL, Oral, Daily PRN, Jaylene Casanova MD    tirofiban (AGGRASTAT) IV bolus 1,860 mcg, 25 mcg/kg, Intravenous, Once, Jaylene Casanova MD    [facility]) tablet 90 mg, 90 mg, Oral, BID, Jaylene Casanova MD, 90 mg at 06/19/20 8098    aspirin chewable tablet 81 mg, 81 mg, Oral, Daily, Jaylene Casanova MD, 81 mg at 06/19/20 5317    calcium replacement protocol, , Other, RX Placeholder, Christin Candance Spare, APRN - CNP  Prior to Admission medications    Medication Sig Start Date End Date Taking?  Authorizing Provider   pantoprazole (PROTONIX) 40 MG tablet Take 1 tablet by mouth 2 times daily (before meals) 6/17/20  Yes ROB Mattson CNP   venlafaxine (EFFEXOR XR) 150 MG extended release capsule Take 150 mg by mouth daily   Yes Historical Provider, MD   aspirin EC 81 MG EC tablet Take 81 mg by mouth daily   Yes Historical Provider, MD   metoprolol succinate (TOPROL XL) 50 MG extended release tablet Take 50 mg by mouth daily   Yes Historical Provider, MD   pitavastatin (LIVALO) 1 MG TABS tablet Take 1 mg by mouth nightly    Yes Historical Provider, MD   Nizatidine (AXID PO) Take by mouth as needed   Yes Historical Provider, MD   levothyroxine (SYNTHROID) 75 MCG tablet Take 75 mcg by mouth Daily   Yes Historical Provider, MD     Additional information:       VITAL SIGNS   Vitals:    06/19/20 1113   BP: 113/70   Pulse: 72   Resp: 16   Temp: 98.9 °F (37.2 °C)   SpO2: 97%       PHYSICAL:   General: No acute distress  HEENT:  Unremarkable for age  Neck: without increased JVD, carotid pulses 2+ bilaterally without bruits  Heart: RRR, S1 & S2 WNL, S4 gallop, without murmurs or rubs   NYHA: 2  Lungs: Clear to auscultation    Abdomen: BS present, without HSM, masses, or tenderness    Extremities: without C,C,E.  Pulses 2+ bilaterally  Mental Status: Alert & Oriented        PLANNED PROCEDURE   [x]Cath  [x]PCI                []Pacemaker/AICD  []NIKOLAY             []Cardioversion []Peripheral angiography/PTA  []Other:      SEDATION  Planned agent:[x]Midazolam []Meperidine [x]Sublimaze []Morphine  []Diazepam  []Other:     ASA Classification:  []1 []2 [x]3 []4 []5  Class 1: A normal healthy patient  Class 2: Pt with mild to moderate systemic disease  Class 3: Severe systemic disease or disturbance  Class 4: Severe systemic disorders that are already life threatening. Class 5: Moribund pt with little chances of survival, for more than 24 hours. Mallampati I Airway Classification:   []1 [x]2 []3 []4    [x]Pre-procedure diagnostic studies complete and results available. Comment:    [x]Previous sedation/anesthesia experiences assessed. Comment:    [x]The patient is an appropriate candidate to undergo the planned procedure sedation and anesthesia. (Refer to nursing sedation/analgesia documentation record)  [x]Formulation and discussion of sedation/procedure plan, risks, and expectations with patient and/or responsible adult completed. [x]Patient examined immediately prior to the procedure.  (Refer to nursing sedation/analgesia documentation record)    Stacy Cornejo MD   Electronically signed 6/19/2020 at 1:15 PM

## 2020-06-19 NOTE — PROGRESS NOTES
erythema  Head: normocephalic and atraumatic  Musculoskeletal: normal range of motion, no joint swelling, deformity or tenderness  Neurological: alert, oriented, normal speech, no focal findings or movement disorder noted    Medications:    aspirin  325 mg Oral Once    sodium chloride flush  10 mL Intravenous 2 times per day    metoprolol tartrate  25 mg Oral BID    sodium chloride flush  10 mL Intravenous 2 times per day    pantoprazole  40 mg Oral BID AC    levothyroxine  75 mcg Oral QAM AC    therapeutic multivitamin-minerals  1 tablet Oral Lunch    venlafaxine  150 mg Oral Daily    atorvastatin  80 mg Oral Nightly    tirofiban  25 mcg/kg Intravenous Once    ticagrelor  90 mg Oral BID    aspirin  81 mg Oral Daily    calcium replacement protocol   Other RX Placeholder      sodium chloride       sodium chloride flush, 10 mL, PRN  potassium chloride, 40 mEq, PRN    Or  potassium alternative oral replacement, 40 mEq, PRN    Or  potassium chloride, 10 mEq, PRN  sodium chloride flush, 10 mL, PRN  ondansetron, 4 mg, Q6H PRN  acetaminophen, 650 mg, Q4H PRN  magnesium hydroxide, 30 mL, Daily PRN        Diagnostics:  EK2020 10:27:16 [facility] ROUTINE RETRIEVAL  Normal sinus rhythm  Low voltage QRS, consider pulmonary disease, pericardial effusion, or normal variant  Nonspecific ST and T wave abnormality  Prolonged QT interval or tu fusion, consider myocardial disease, electrolyte imbalance, or drug effects  Abnormal ECG  When compared with ECG of 2020 22:40,  Premature ventricular complexes are no longer Present  ST no longer elevated in Lateral leads  Nonspecific T wave abnormality is worse in Anterolateral leads  Confirmed by Lisa Hirsch ([id number]) on 2020 5:13:37 PM    Echo:    Electronically signed by Lima Valadez MD (Interpreting   physician) on 06/15/2020 at 07:24 PM   ----------------------------------------------------------------      Findings      Mitral changes. PERIPHERAL ANGIOGRAM:  ABDOMINAL AORTA:  Patent.     COMMON ILIAC ARTERIES:  Bilaterally patent.     EXTERNAL ILIAC ARTERIES:  Bilaterally patent.     COMMON FEMORAL ARTERIES:  Bilaterally patent.       Left Heart Cath:   RA 8, RV 41/9, PA 37/17 with a mean of 25, PA saturation is 59%. CORONARY ANGIOGRAM:  LEFT MAIN:  Patent without any significant obstruction.     LAD:  LAD has mid diffuse 30% long stenosis followed by mild luminal  irregularities distally. There is a small diagonal branch with ostial  70% stenosis, but it is approximately 1.5 mm in diameter maximum.     LCX:  100% thrombotically occluded proximal segment.     RCA:  80% stenosis in the mid segment, long diffuse stenosis of the  entire mid segment. It bifurcates into PDA and PLB, both without any  significant obstruction.     SUMMARY:  Successful left circumflex STEMI PCI x1 DAVIS; severe residual  LAD and RCA stenosis; patent iliofemoral system; reduced cardiac index  with preserved PA saturation.     PLAN:  1. ICU care. 2.  TTE. 3.  Judicious IV fluids. 4.  Monitor the patient's status clinically. If she progresses in a  wrong direction, she may need to return for multivessel PCI and Impella  support. 5.  Routine access site care. 6.  Routine ACS care. 7.  Aggrastat x18 hours to the thrombotic burden. 8.  If bleeding around the sheath, then patient will need the sheath  removed and manual pressure used for hemostasis. 10.  Will need staged PCI of the LAD and the RCA.     All the above was explained to the patient and the patient's family. They are agreeable and amenable to the above plan.           Bailee Stoddard MD   D: 06/16/2020       Lab Data:    Cardiac Enzymes:  No results for input(s): CKTOTAL, CKMB, CKMBINDEX, TROPONINI in the last 72 hours.     CBC:   Lab Results   Component Value Date    WBC 7.8 06/19/2020    RBC 4.01 06/19/2020    HGB 11.6 06/19/2020    HCT 36.3 06/19/2020     06/19/2020       CMP: Lab Results   Component Value Date     06/19/2020    K 4.1 06/19/2020     06/19/2020    CO2 21 06/19/2020    BUN 11 06/19/2020    CREATININE 0.7 06/19/2020    LABGLOM 82 06/19/2020    GLUCOSE 108 06/19/2020    CALCIUM 8.8 06/19/2020       Hepatic Function Panel:    Lab Results   Component Value Date    ALKPHOS 98 06/19/2020    ALT 26 06/19/2020    AST 44 06/19/2020    PROT 6.7 06/19/2020    BILITOT 2.0 06/19/2020    BILIDIR <0.2 06/15/2020    LABALBU 3.3 06/19/2020       Magnesium:    Lab Results   Component Value Date    MG 2.1 06/17/2020       PT/INR:    Lab Results   Component Value Date    INR 1.14 06/19/2020       HgBA1c:    Lab Results   Component Value Date    LABA1C 5.6 06/14/2020       FLP:    Lab Results   Component Value Date    TRIG 87 06/14/2020    HDL 31 06/14/2020    LDLCALC 71 06/14/2020       TSH:  No results found for: TSH      Assessment:  Stemi -    S\p cardiac cath 6/13/2020: Successful left circumflex STEMI PCI x1 DAVIS; severe residual LAD and RCA stenosis; patent iliofemoral system - EF 45%    Anemia:   EGD 6-  IMPRESSION:  1.  Esophagus - large hiatal hernia  2.  Stomach - diffuse gastritis with Rhett erosions and scattered, small, clean-based ulcers  3.  Duodenum - normal       ICDMP EF 45%  ?  New AFB -- currently SR -- no AFB noted while on 3B    RT groin hematoma- improving   HTN  HLP    E COlI UTI -- hospitalists managing         Plan:  · Per GI - ok for antiplatelets   · Staged PCI today   · continue cardiac meds   · For ? AFB -- event at DC   · follow         Electronically signed by ROB Bhakta CNP on 6/19/2020 at 8:22 AM

## 2020-06-20 VITALS
TEMPERATURE: 97.5 F | HEART RATE: 79 BPM | BODY MASS INDEX: 32.67 KG/M2 | DIASTOLIC BLOOD PRESSURE: 56 MMHG | SYSTOLIC BLOOD PRESSURE: 115 MMHG | RESPIRATION RATE: 18 BRPM | WEIGHT: 166.4 LBS | HEIGHT: 60 IN | OXYGEN SATURATION: 95 %

## 2020-06-20 PROCEDURE — 6370000000 HC RX 637 (ALT 250 FOR IP): Performed by: INTERNAL MEDICINE

## 2020-06-20 PROCEDURE — 99239 HOSP IP/OBS DSCHRG MGMT >30: CPT | Performed by: NURSE PRACTITIONER

## 2020-06-20 PROCEDURE — 6370000000 HC RX 637 (ALT 250 FOR IP): Performed by: NURSE PRACTITIONER

## 2020-06-20 PROCEDURE — 2580000003 HC RX 258: Performed by: INTERNAL MEDICINE

## 2020-06-20 RX ORDER — ATORVASTATIN CALCIUM 80 MG/1
80 TABLET, FILM COATED ORAL NIGHTLY
Qty: 30 TABLET | Refills: 3 | Status: SHIPPED | OUTPATIENT
Start: 2020-06-20 | End: 2020-06-20

## 2020-06-20 RX ORDER — ATORVASTATIN CALCIUM 80 MG/1
80 TABLET, FILM COATED ORAL NIGHTLY
Qty: 30 TABLET | Refills: 3 | Status: SHIPPED | OUTPATIENT
Start: 2020-06-20 | End: 2020-06-30 | Stop reason: SDUPTHER

## 2020-06-20 RX ORDER — NITROGLYCERIN 0.4 MG/1
TABLET SUBLINGUAL
Qty: 25 TABLET | Refills: 1 | Status: SHIPPED | OUTPATIENT
Start: 2020-06-20 | End: 2020-06-30 | Stop reason: SDUPTHER

## 2020-06-20 RX ADMIN — ASPIRIN 81 MG 81 MG: 81 TABLET ORAL at 08:56

## 2020-06-20 RX ADMIN — LEVOTHYROXINE SODIUM 75 MCG: 75 TABLET ORAL at 06:26

## 2020-06-20 RX ADMIN — Medication 10 ML: at 08:56

## 2020-06-20 RX ADMIN — TICAGRELOR 90 MG: 90 TABLET ORAL at 08:56

## 2020-06-20 RX ADMIN — METOPROLOL TARTRATE 25 MG: 25 TABLET ORAL at 08:56

## 2020-06-20 RX ADMIN — PANTOPRAZOLE SODIUM 40 MG: 40 TABLET, DELAYED RELEASE ORAL at 06:26

## 2020-06-20 RX ADMIN — MULTIPLE VITAMINS W/ MINERALS TAB 1 TABLET: TAB at 15:35

## 2020-06-20 RX ADMIN — PANTOPRAZOLE SODIUM 40 MG: 40 TABLET, DELAYED RELEASE ORAL at 15:36

## 2020-06-20 ASSESSMENT — PAIN SCALES - GENERAL: PAINLEVEL_OUTOF10: 0

## 2020-06-20 NOTE — PROGRESS NOTES
Cardiology Progress Note      Patient:  Loraine Palumbo  YOB: 1945  MRN: [id number]   Acct: [de-identified]  Admit Date:  6/13/2020  Primary Cardiologist: none  Seen by Dr. Delia Sharp     Per prior cardiology consult note-  CHIEF COMPLAINT: STEMI        HPI: This is a pleasant 69 y.o. female presents with SSCP, 8/10, while trimming shrubs this afternoon. Never has prior. No prior cardiac history. Called 911, EMS found inferior STEMI, brought to ED, given Heparin/DAPT, brought to cath lab, PCI of the LCX with OCT guidance, with significant thrombotic burden to the OM x 3 branches, severe LAD stenosis, and severe RCA stenosis. Hx of HLD and HTN. No CVA. Does not know what BP runs usually. Post procedure, no symptoms. No chest pain, angina, MATHEW, orthopnea, PND, sob at rest, palpitations, LE edema, or syncope.       Subjective (Events in last 24 hours):     Chart reviewed     Pt up in chair - no chest pain or SOB   Groin site ecchymotic down thigh to knee - good pulses     VSS  Tele SR no ectopy    Plan is for staged PCI Friday 6/18/2020  Pt up in chair - she voices no chest pain or SOB   VSS  Tele SR no ectopy    For PCI tomorrow        6/19/2020  PCI today     No cardiac c/o  VSS  Tele SR no ectopy      6/20/2020  Pt without cardiac c/o  RT groin / thigh bruised (old cath site) - no pain     RT radial bruised from procedure yesterday   No pain - neurovascular check WNL pulses presnet       Tele SR   VSS      Objective:   /65   Pulse 86   Temp 98.8 °F (37.1 °C) (Oral)   Resp 16   Ht 5' (1.524 m)   Wt 166 lb 6.4 oz (75.5 kg)   SpO2 93%   BMI 32.50 kg/m²        TELEMETRY: SR    Physical Exam:  General Appearance: alert and oriented to person, place and time, in no acute distress  Cardiovascular: normal rate, regular rhythm, normal S1 and S2, no murmurs, rubs, clicks, or gallops, distal pulses intact,   Pulmonary/Chest: clear to auscultation bilaterally- no wheezes, rales or rhonchi, normal air movement, no respiratory distress  Abdomen: soft, non-tender, non-distended, normal bowel sounds, no masses Extremities: no cyanosis, clubbing or edema, pulses present   Skin: warm and dry, no rash or erythema  Head: normocephalic and atraumatic  Musculoskeletal: normal range of motion, no joint swelling, deformity or tenderness  Neurological: alert, oriented, normal speech, no focal findings or movement disorder noted    Medications:    aspirin  325 mg Oral Once    sodium chloride flush  10 mL Intravenous 2 times per day    metoprolol tartrate  25 mg Oral BID    pantoprazole  40 mg Oral BID AC    levothyroxine  75 mcg Oral QAM AC    therapeutic multivitamin-minerals  1 tablet Oral Lunch    venlafaxine  150 mg Oral Daily    atorvastatin  80 mg Oral Nightly    tirofiban  25 mcg/kg Intravenous Once    ticagrelor  90 mg Oral BID    aspirin  81 mg Oral Daily    calcium replacement protocol   Other RX Placeholder      sodium chloride 50 mL/hr at 20 1304     sodium chloride flush, 10 mL, PRN  acetaminophen, 650 mg, Q4H PRN  potassium chloride, 40 mEq, PRN    Or  potassium alternative oral replacement, 40 mEq, PRN    Or  potassium chloride, 10 mEq, PRN  ondansetron, 4 mg, Q6H PRN  magnesium hydroxide, 30 mL, Daily PRN        Diagnostics:  EK2020 05:58:18 [facility] ROUTINE RETRIEVAL  Normal sinus rhythm  Low voltage QRS, consider pulmonary disease, pericardial effusion, or normal variant  Nonspecific ST and T wave abnormality  Abnormal ECG  When compared with ECG of 2020 10:27,  Nonspecific T wave abnormality has improved in Lateral leads  Confirmed by CHRIS Pastrana ([id number]) on 2020 11:36:20 AM      2020 10:27:16 [facility] ROUTINE RETRIEVAL  Normal sinus rhythm  Low voltage QRS, consider pulmonary disease, pericardial effusion, or normal variant  Nonspecific ST and T wave abnormality  Prolonged QT interval or tu fusion, consider myocardial disease, electrolyte imbalance, or drug effects  Abnormal ECG  When compared with ECG of 13-JUN-2020 22:40,  Premature ventricular complexes are no longer Present  ST no longer elevated in Lateral leads  Nonspecific T wave abnormality is worse in Anterolateral leads  Confirmed by Jt Aleman ([id number]) on 6/16/2020 5:13:37 PM    Echo:    Electronically signed by Rock Lia CARUSO (Interpreting   physician) on 06/15/2020 at 07:24 PM   ----------------------------------------------------------------      Findings      Mitral Valve   The mitral valve structure was normal with normal leaflet separation. DOPPLER: The transmitral velocity was within the normal range with no   evidence for mitral stenosis. Mild mitral regurgitation is present. Aortic Valve   The aortic valve was trileaflet with normal thickness and cuspal   separation. DOPPLER: Transaortic velocity was within the normal range with   no evidence of aortic stenosis. There was no evidence of aortic   regurgitation. Tricuspid Valve   The tricuspid valve structure was normal with normal leaflet separation. DOPPLER: There was no evidence of tricuspid stenosis. There was no   evidence of tricuspid regurgitation. Pulmonic Valve   The pulmonic valve leaflets exhibited normal thickness, no calcification,   and normal cuspal separation. DOPPLER: The transpulmonic velocity was   within the normal range with no evidence for regurgitation. Left Atrium   The left atrium is Mildly dilated. Left Ventricle   Left ventricle size is normal.   Normal left ventricular wall thickness. There were regional wall motion abnormalities. Moderately hypokinetic motion of the lateral wall noted in the left   ventricle. Low Normal LV Systolic function. Ejection fraction is visually estimated at 50%. Doppler parameters were consistent with abnormal left ventricular   relaxation (grade 1 diastolic dysfunction).       Right Atrium   Right atrial size was normal.      Right Ventricle   The right ventricular size was normal with normal systolic function and   wall thickness. Pericardial Effusion   The pericardium was normal in appearance with no evidence of a pericardial   effusion. small anterior echo free space      Pleural Effusion   No evidence of pleural effusion. Aorta / Great Vessels   -Aortic root dimension within normal limits.   -The Pulmonary artery is within normal limits. -IVC size is within normal limits with normal respiratory phasic changes. PERIPHERAL ANGIOGRAM:  ABDOMINAL AORTA:  Patent.     COMMON ILIAC ARTERIES:  Bilaterally patent.     EXTERNAL ILIAC ARTERIES:  Bilaterally patent.     COMMON FEMORAL ARTERIES:  Bilaterally patent.       Left Heart Cath:   RA 8, RV 41/9, PA 37/17 with a mean of 25, PA saturation is 59%. CORONARY ANGIOGRAM:  LEFT MAIN:  Patent without any significant obstruction.     LAD:  LAD has mid diffuse 30% long stenosis followed by mild luminal  irregularities distally. There is a small diagonal branch with ostial  70% stenosis, but it is approximately 1.5 mm in diameter maximum.     LCX:  100% thrombotically occluded proximal segment.     RCA:  80% stenosis in the mid segment, long diffuse stenosis of the  entire mid segment. It bifurcates into PDA and PLB, both without any  significant obstruction.     SUMMARY:  Successful left circumflex STEMI PCI x1 DAVIS; severe residual  LAD and RCA stenosis; patent iliofemoral system; reduced cardiac index  with preserved PA saturation.     PLAN:  1. ICU care. 2.  TTE. 3.  Judicious IV fluids. 4.  Monitor the patient's status clinically. If she progresses in a  wrong direction, she may need to return for multivessel PCI and Impella  support. 5.  Routine access site care. 6.  Routine ACS care. 7.  Aggrastat x18 hours to the thrombotic burden.   8.  If bleeding around the sheath, then patient will need the sheath  removed and manual pressure

## 2020-06-20 NOTE — PLAN OF CARE
Problem: Pain:  Goal: Pain level will decrease  Description: Pain level will decrease  Outcome: Ongoing  Goal: Control of acute pain  Description: Control of acute pain  Outcome: Ongoing  Goal: Control of chronic pain  Description: Control of chronic pain  Outcome: Ongoing   Care plan reviewed with patient and husbnd. Patient and husbnd verbalize understanding of the plan of care and contribute to goal setting.
Problem: Pain:  Goal: Pain level will decrease  Description: Pain level will decrease  Outcome: Ongoing  Note: Patient denies pain this shift. Continue to monitor for pain with assessments and purposeful hourly rounding. Problem: Discharge Planning:  Goal: Discharged to appropriate level of care  Description: Discharged to appropriate level of care  Outcome: Ongoing  Note: Plan for patient to return home with  at the end of stay. Problem: Fluid Volume - Imbalance:  Goal: Will show no signs and symptoms of excessive bleeding  Description: Will show no signs and symptoms of excessive bleeding  Outcome: Ongoing  Note: Monitoring patient's radial site from cath very closely this shift. Will report any concerns to physician. Problem: Anxiety:  Goal: Level of anxiety will decrease  Description: Level of anxiety will decrease  Outcome: Ongoing  Note: Patient remains calm and cooperative this shift. Continue to monitor for anxiety with assessments and purposeful hourly rounding. Problem: Cardiac Output - Decreased:  Goal: Cardiac output within specified parameters  Description: Cardiac output within specified parameters  Outcome: Ongoing  Note: Patient remains stable this shift. Continue to monitor labs and vitals as ordered. Report any concerns to physician. Problem: Tissue Perfusion - Cardiopulmonary, Altered:  Goal: Absence of angina  Description: Absence of angina  Outcome: Ongoing  Note: Patient remains pain free this shift. Will report any concerns to physician. Problem: Tissue Perfusion - Peripheral, Altered:  Goal: Absence of hematoma at arterial access site  Description: Absence of hematoma at arterial access site  Outcome: Ongoing  Note: Please see above. Care plan reviewed with patient. Patient verbalizes understanding of the plan of care and contributes to goal setting.
parameters  Description: Circulatory function within specified parameters  Outcome: Ongoing     Problem: Tissue Perfusion - Cardiopulmonary, Altered:  Goal: Absence of angina  Description: Absence of angina  Outcome: Ongoing     Problem: Tissue Perfusion - Cardiopulmonary, Altered:  Goal: Hemodynamic stability will improve  Description: Hemodynamic stability will improve  Outcome: Ongoing     Problem: Tissue Perfusion - Peripheral, Altered:  Goal: Absence of hematoma at arterial access site  Description: Absence of hematoma at arterial access site  Outcome: Ongoing    Care plan reviewed with patient. Patient verbalizes understanding of the care plan and contributed to goal setting.

## 2020-06-20 NOTE — PROGRESS NOTES
Discharge instructions reviewed with the patient. All appointments reviewed and medications reviewed with her. Called [facility] and confirmed that her prescriptions were called into them. Her scripts are there and waiting for her. Brilinta card given to patient to take to the pharmacy with her. All of the patient's possessions were taken home with her. Patient and  verbalized understanding. Patient taken via WC to waiting vehicle.

## 2020-06-22 ENCOUNTER — TELEPHONE (OUTPATIENT)
Dept: CARDIOLOGY CLINIC | Age: 75
End: 2020-06-22

## 2020-06-22 NOTE — PROCEDURES
[address] [place] [address]                            CARDIAC CATHETERIZATION    PATIENT NAME: Guillaume Gage                     :        1945  MED REC NO:   [id number]                           ROOM:       0025  ACCOUNT NO:   [de-identified]                           ADMIT DATE: 2020  PROVIDER:     Emma Barrera MD    DATE OF PROCEDURE:  2020    CARDIAC CATHETERIZATION    INDICATIONS:  Severe multivessel CAD, status post STEMI presenting for a  complete revascularization in the setting of dyspnea on exertion. DESCRIPTION OF PROCEDURE:  After informed consent was obtained from the  patient, she was brought to the cardiac catheterization laboratory and  prepped in a sterile fashion. Right radial artery was chosen as the  primary point of access. Preprocedure timeout was completed. After  infiltration of the right wrist with 2% lidocaine using micropuncture  and modified Seldinger technique under fluoroscopic guidance and  ultrasound guidance, I was able to insert a 6-Algerian Slender sheath into  the right radial artery. Standard antispasmodic/antithrombotic cocktail  was given intraarterially. We then proceeded with intervention part. We had preprocedure angiography performed at the time of her  ST-elevation myocardial infarction which demonstrated severe LAD, 70% to  80% stenosis and severe RCA, 80% to 90% stenosis. We elected to proceed with intervention of the LAD first and then  proceed with RCA intervention thereafter. I exchanged out and  cannulated the left main with a 6-Algerian EBU 3.5 guiding catheter. Heparin IV was given. ACT was confirmed to be above 250 seconds. I  wired the lesion using a Runthrough wire. I direct stented the lesion  using a 2.5 x 38 Xience Kay DAVIS at 8 atmospheres.   I postdilated the  stent with a 2.5 x 15 NC balloon distally at 12 to 14 atmospheres and  proximally up to 24 atmospheres. I then used 3.0 x 8 NC balloon up to  16 atmospheres proximally. Post PCI angiography demonstrates WILFREDO-3  flow without any perforation, dissection, distal embolization, side  branch loss, guide or guidewire-induced trauma. Thereafter, I exchanged  the guides out for a 6-Syriac JR4 guiding catheter. I cannulated the  RCA without complication. I then wired the RCA with the Runthrough  wire. I predilated the lesion using a 2.5 x 15 NC balloon x6 inflations  at nominal pressure. I then used 3.0 x [address] and  deployed this at 11 atmospheres. I passed another 3.0 x [address] and deployed this proximal to the first stent deployed in an  overlapping fashion. There was significant ostial disease. Therefore, I elected to stent the ostium with a 3.0 x 12 Xience [address] and deployed this in an overlapping fashion with the first stent  deployed. Distally, I postdilated the stent with a 3.0 x 12 NC balloon  up to 22 atmospheres and then along the length of the entire RCA. I  postdilated the proximal RCA with the 3.0 x 12 again up to 22  atmospheres. Post PCI angiography demonstrates WILFREDO-3 flow without any  perforation, dissection, distal embolization, side branch loss, guide or  guidewire-induced trauma. All equipments were removed from the patient. The patient tolerated the procedure well. IMMEDIATE COMPLICATIONS:  None. MEDICATIONS:  See EMR. ACCESS:  Vasc Band was used for hemostasis. ESTIMATED BLOOD LOSS:  Less than 10 mL. SUMMARY:  Successful PCI of the LAD with one drug-eluting stent;  successful PCI of the RCA with two overlapping drug-eluting stents. PLAN:  1. DAPT. 2.  Optimal medical therapy. 3.  Risk factor management. 4.  Routine access site care  5. Bed rest.  6.  IV fluids. 7.  Overnight observation. 9.  Routine access site care. 8.  The patient has a history of GI bleed, post her STEMI with GI  ulcerations.   She is cleared for

## 2020-06-30 ENCOUNTER — OFFICE VISIT (OUTPATIENT)
Dept: CARDIOLOGY CLINIC | Age: 75
End: 2020-06-30
Payer: MEDICARE

## 2020-06-30 ENCOUNTER — TELEPHONE (OUTPATIENT)
Dept: CARDIOLOGY CLINIC | Age: 75
End: 2020-06-30

## 2020-06-30 VITALS
BODY MASS INDEX: 32.43 KG/M2 | HEART RATE: 78 BPM | DIASTOLIC BLOOD PRESSURE: 62 MMHG | WEIGHT: 165.2 LBS | SYSTOLIC BLOOD PRESSURE: 112 MMHG | HEIGHT: 60 IN

## 2020-06-30 PROCEDURE — 93000 ELECTROCARDIOGRAM COMPLETE: CPT | Performed by: NURSE PRACTITIONER

## 2020-06-30 PROCEDURE — 99213 OFFICE O/P EST LOW 20 MIN: CPT | Performed by: NURSE PRACTITIONER

## 2020-06-30 RX ORDER — ATORVASTATIN CALCIUM 80 MG/1
80 TABLET, FILM COATED ORAL NIGHTLY
Qty: 90 TABLET | Refills: 3 | Status: SHIPPED | OUTPATIENT
Start: 2020-06-30 | End: 2021-03-19 | Stop reason: SDUPTHER

## 2020-06-30 RX ORDER — PANTOPRAZOLE SODIUM 40 MG/1
40 TABLET, DELAYED RELEASE ORAL
Qty: 180 TABLET | Refills: 3 | Status: SHIPPED | OUTPATIENT
Start: 2020-06-30 | End: 2021-03-19 | Stop reason: SDUPTHER

## 2020-06-30 RX ORDER — NITROGLYCERIN 0.4 MG/1
TABLET SUBLINGUAL
Qty: 25 TABLET | Refills: 3 | Status: SHIPPED | OUTPATIENT
Start: 2020-06-30

## 2020-06-30 NOTE — PROGRESS NOTES
[facility] PROFESSIONAL SERVICES  [facility]   [address]   [address] [address]   Dept: [phone]   Dept Fax: [phone]   Loc: [phone]      Chief Complaint   Patient presents with    Follow-up    Coronary Artery Disease     s/p PCI     F/U from inferior STEMI with PCI/DAVIS of LCx, LAD, RCA in 70 y/o female with no prior history of CAD, history of HTN, HLP. Post procedure right groin hematoma and anemia. GI evaluation, s/p EGD with large hiatal hernia, diffuse gastritis with [place]fort erosions and scattered, small, clean-based ulcers, GI ok for DAPT. No bleeding issues since discharge. EF 50, G1DD. Denies chest pain, palpitations ,JODI, lightheadedness, dizziness or syncope. Has some intermittent sob with exertion, ? R/t brilinta, not limiting activity. Cardiologist:  Dr. Smita Brown:   No fever, no chills, No fatigue or weight loss  Pulmonary:    + intermittent dyspnea with exertion, no wheezing  Cardiac:    Denies recent chest pain   GI:     No nausea or vomiting, no abdominal pain  Neuro:    No dizziness or light headedness  Musculoskeletal:  No recent active issues  Extremities:   No edema, good peripheral pulses      Past Medical History:   Diagnosis Date    CAD (coronary artery disease)        No Known Allergies    Current Outpatient Medications   Medication Sig Dispense Refill    nitroGLYCERIN (NITROSTAT) 0.4 MG SL tablet up to max of 3 total doses.  If no relief after 1 dose, call 911. 25 tablet 3    atorvastatin (LIPITOR) 80 MG tablet Take 1 tablet by mouth nightly 90 tablet 3    metoprolol tartrate (LOPRESSOR) 25 MG tablet Take 1 tablet by mouth 2 times daily 180 tablet 3    ticagrelor (BRILINTA) 90 MG TABS tablet Take 1 tablet by mouth 2 times daily 180 tablet 3    pantoprazole (PROTONIX) 40 MG tablet Take 1 tablet by mouth 2 times daily (before meals) 180 tablet 3    venlafaxine (EFFEXOR XR) 150 MG extended release capsule Take 150 mg by mouth daily      aspirin EC 81 MG EC tablet Take 81 mg by mouth daily      levothyroxine (SYNTHROID) 75 MCG tablet Take 75 mcg by mouth Daily       No current facility-administered medications for this visit. Social History     Socioeconomic History    Marital status:      Spouse name: None    Number of children: None    Years of education: None    Highest education level: None   Occupational History    None   Social Needs    Financial resource strain: None    Food insecurity     Worry: None     Inability: None    Transportation needs     Medical: None     Non-medical: None   Tobacco Use    Smoking status: Never Smoker    Smokeless tobacco: Never Used   Substance and Sexual Activity    Alcohol use: Not Currently    Drug use: Never    Sexual activity: None   Lifestyle    Physical activity     Days per week: None     Minutes per session: None    Stress: None   Relationships    Social connections     Talks on phone: None     Gets together: None     Attends [employer] service: None     Active member of club or organization: None     Attends meetings of clubs or organizations: None     Relationship status: None    Intimate partner violence     Fear of current or ex partner: None     Emotionally abused: None     Physically abused: None     Forced sexual activity: None   Other Topics Concern    None   Social History Narrative    None       History reviewed. No pertinent family history. Blood pressure 112/62, pulse 78, height 5' (1.524 m), weight 165 lb 3.2 oz (74.9 kg). General:   Well developed, well nourished  Lungs:   Clear to auscultation  Heart:    Normal S1 S2, No murmur, rubs, or gallops  Abdomen:   Soft, non tender, no organomegalies, positive bowel sounds  Extremities:   No edema, no cyanosis, good peripheral pulses  Neurological:   Awake, alert, oriented.  No obvious focal deficits  Musculoskeletal:  No obvious deformities    EKG: SR, no acute abnormality    LHC: 6/13/20  INDICATIONS:  STEMI. CORONARY ANGIOGRAM:  LEFT MAIN:  Patent without any significant obstruction.     LAD:  LAD has mid diffuse 30% long stenosis followed by mild luminal  irregularities distally. There is a small diagonal branch with ostial  70% stenosis, but it is approximately 1.5 mm in diameter maximum.     LCX:  100% thrombotically occluded proximal segment.     RCA:  80% stenosis in the mid segment, long diffuse stenosis of the  entire mid segment. It bifurcates into PDA and PLB, both without any  significant obstruction. RA 8, RV 41/9, PA 37/17 with a mean of 25, PA saturation is 59%.     Given the findings, it appeared that she was slightly hypovolemic. I  did cross into the LV gram.  The LV systolic pressure is 86. No  significant LV to AO systolic gradient. LVEDP of 15 to 20. LVEF  appeared to be about 45% to 50% with some mild inferior wall  hypokinesis. Aortic valve was tricuspid. No significant aneurysm or  dissection in the visualized portion of the aorta.     PERIPHERAL ANGIOGRAM:  Given the fact that she has reduced cardiac index  by Tawnya calculation; however, this did not fit with the clinical  picture. I did take a peripheral angiogram after inserting the pigtail  catheter into the abdominal aorta to ensure that she had appropriate  iliofemoral access for any hemodynamic support if necessary.     PERIPHERAL ANGIOGRAM:  ABDOMINAL AORTA:  Patent.     COMMON ILIAC ARTERIES:  Bilaterally patent.     EXTERNAL ILIAC ARTERIES:  Bilaterally patent.     COMMON FEMORAL ARTERIES:  Bilaterally patent. SUMMARY:  Successful left circumflex STEMI PCI x1 DAVIS; severe residual  LAD and RCA stenosis; patent iliofemoral system; reduced cardiac index  with preserved PA saturation.   PLAN:  1. ICU care. 2.  TTE. 3.  Judicious IV fluids. 4.  Monitor the patient's status clinically. If she progresses in a  wrong direction, she may need to return for multivessel PCI and Impella  support.   5. Routine access site care. 6.  Routine ACS care. 7.  Aggrastat x18 hours to the thrombotic burden. 8.  If bleeding around the sheath, then patient will need the sheath  removed and manual pressure used for hemostasis. 10.  Will need staged PCI of the LAD and the RCA.     All the above was explained to the patient and the patient's family. They are agreeable and amenable to the above plan.           Monica Arzate MD    PCI: 6/19/20  SUMMARY:  Successful PCI of the LAD with one drug-eluting stent;  successful PCI of the RCA with two overlapping drug-eluting stents. Monica Arzate MD    Echo: 6/15/20  Summary   Left ventricle size is normal.   Normal left ventricular wall thickness. There were regional wall motion abnormalities. Moderately hypokinetic motion of the lateral wall noted in the left   ventricle. Low Normal LV Systolic function. Ejection fraction is visually estimated at 50%. Doppler parameters were consistent with abnormal left ventricular   relaxation (grade 1 diastolic dysfunction). The left atrium is Mildly dilated. Signature      ----------------------------------------------------------------   Electronically signed by Roro Gordon MD   Diagnosis Orders   1. S/P percutaneous transluminal angioplasty (PTA) with stent placement  EKG 12 Lead   2. Acute ST elevation myocardial infarction (STEMI) of inferior wall (HCC)     3. Coronary artery disease involving native coronary artery of native heart without angina pectoris     4. Ischemic cardiomyopathy     5. Essential hypertension     6. Hyperlipidemia, unspecified hyperlipidemia type         Orders Placed This Encounter   Procedures    EKG 12 Lead     Order Specific Question:   Reason for Exam?     Answer: Other   F/U inferior STEMI with PCI/DAVIS of LCx, LAD, RCA: No chest pain, some intermittent sob with exertion.   Right groin hematoma: area soft, ecchymotic, no pain  Post procedure anemia  ICMP: EF 50,  no fluid overload  HTN: controlled  HLP: LDL-C 71  On asa, lipitor 80 mg, lopressor BID, brilinta, protonix, No ACEi/ARB/imdur at this time with lower normal BP. Cardiac rehab scheduled this week. Will need f/u lipid, hepatic panel in 3 months     Discussed use, benefit, and side effects of prescribed medications. All patient questions answered. Pt voiced understanding. Instructed to continue current medications, diet and exercise. Continue risk factor modification and medical management. Patient agreed with treatment plan. Follow up as directed.     Continue Dr Derek Govea current treatment plan  Follow up with Dr Radha Streeter as scheduled or sooner if needed

## 2020-07-02 ENCOUNTER — HOSPITAL ENCOUNTER (OUTPATIENT)
Dept: CARDIAC REHAB | Age: 75
Setting detail: THERAPIES SERIES
Discharge: HOME | End: 2020-07-02
Payer: MEDICARE

## 2020-07-02 ENCOUNTER — HOSPITAL ENCOUNTER (OUTPATIENT)
Dept: CARDIAC REHAB | Age: 75
Discharge: HOME | End: 2020-07-02

## 2020-07-02 PROCEDURE — G0423 INTENS CARDIAC REHAB NO EXER: HCPCS

## 2020-07-02 PROCEDURE — G0422 INTENS CARDIAC REHAB W/EXERC: HCPCS

## 2020-07-02 NOTE — PLAN OF CARE
Contemplation  [] Contemplation  [] Preparation  [] Action  [] Maintenance  [] Relapse   EXERCISE ASSESSMENT EXERCISE ASSESSMENT EXERCISE ASSESSMENT EXERCISE ASSESSMENT EXERCISE ASSESSMENT   6 Min Walk Test  Distance walked:   0.13 miles  686 ft.  1.99 METs  Max HR:109 BPM      RPE:  12  THR:  112-126  Rhythm:  NSR    6 Min Walk Test  Distance walked:   ** miles  ** ft  ** METs  Max HR:** BPM      RPE:  **  %Change ft= **    Rhythm:  **   DASI: 4.6 METs DASI: ** METs DASI: ** METs DASI: ** METs DASI: ** METs   Return to Work  Sterling Cellyants on returning to work? [] Yes              [] No   [] Disabled     [x] Retired    Volunteers at [employer] shop  Return to work:  Has Tessa Qureshi returned to work? [] Yes    [] No    Return to work date set? [] Yes, **    [] No    Tessa Budge is doing ** at work. Return to work:  Has Norwood Johnge returned to work? [] Yes    [] No    Return to work date set? [] Yes, **    [] No    Norwood Budge is doing ** at work. Return to work:  Has Tessa Budge returned to work? [] Yes    [] No    Return to work date set? [] Yes, **    [] No    Norwood Budge is doing ** at work. Return to work:  Has Tessarodolfo Lopezge returned to work? [] Yes    [] No    Return to work? [] Yes, **    [] No    *Required MET Level achieved for job duties? [] Yes    [] No   Orthopedic Limitations/  [x] Yes    [] No  If yes please list:  Both hips replaced, and foot problems      Orthopedic Limitations  *If patient has orthopedic issue:   Actions/  accomodations needed to make Norwood Budge successful : Orthopedic Limitations   Orthopedic Limitations   Orthopedic Limitations     Fall Risk  Fall risk assessed? [x] Yes      [] No    Balance Issues?   [] Yes      [x] No     [] Walker [] Cane    [x] Safety issues reviewed      Fall Risk  *If patient is a fall risk, action needed to accommodate:  [address] Exercise  [] Yes    [x] No   Home Exercise  [] Yes    [] No  Type: **  Frequency:**  Duration: ** Home Exercise  [] Yes    [] No  Type: **  Frequency: **  Duration: ** Home Exercise  [] Yes    [] No  Type: **  Frequency: **  Duration: ** Home Exercise  [] Yes    [] No  Type: **  Frequency: **  Duration: **   Angina with Activity? [] Yes    [x] No     Angina with Activity? [] Yes    [] No  Angina Management: ** Angina with Activity? [] Yes    [] No  Angina Management: ** Angina with Activity? [] Yes    [] No  Angina Management: ** Angina with Activity?   [] Yes    [] No  Angina Management: **   EXERCISE PLAN EXERCISE PLAN EXERCISE PLAN EXERCISE PLAN EXERCISE PLAN   *Interventions* *Interventions* *Interventions* *Interventions* *Interventions*   Exercise Prescription  (per physician & CR staff) Exercise Prescription  (per physician & CR staff) Exercise Prescription  (per physician & CR staff) Exercise Prescription  (per physician & CR staff) Exercise Prescription  (per physician & CR staff)   Cardiovascular Cardiovascular Cardiovascular Cardiovascular Cardiovascular   Mode:    [x] Treadmill (TM)  [x] Schwinn Airdyne (AD)  [x] Arms Ergometer (AE)   MODE:    [] Treadmill (TM)  [] Schwinn Airdyne (AD)  [] Arms Ergometer (AE)  [] NuStep  [] Elliptical (E) MODE:    [] Treadmill (TM)  [] Schwinn Airdyne (AD)  [] Arms Ergometer (AE)  [] NuStep  [] Elliptical (E) MODE:    [] Treadmill (TM)  [] Schwinn Airdyne (AD)  [] Arms Ergometer (AE)  [] NuStep  [] Elliptical (E) MODE:    [] Treadmill (TM)  [] Schwinn Airdyne (AD)  [] Arms Ergometer (AE)  [] NuStep  [] Elliptical (E)   Initial Workloads  TM: [email or web address] 2.1 METs  AD: 0.5 level =1.9 METs  NS:26  Navarro=1.9 METs  AE: 0.2 level = 1.3 METs Current Workloads  TM:  @ %=  METs  AD:  level =  METs  NS:   Navarro=  METs  AE:  level =  METs Current Workloads  TM:  @ %=  METs  AD:  level =  METs  NS:   Navarro=  METs  AE:  level =  METs Current Workloads  TM:  @ %=  METs  AD:  level =  METs  NS:   Navarro=  METs  AE:  level =  METs Current Workloads  TM:  @ %=  METs  AD:  level =  METs  NS:   Navarro=  METs  AE:  level = METs     Frequency:    ICR: 3x/week  Home: 2-3x/wk Frequency:   ICR: 3x/week  Home: 3x/wk Frequency:  ICR: 3x/week  Home: 3-4x/wk Frequency:  ICR: 3x/week  Home: 3-4x/wk Frequency:  Naomi Lopez will continue exercise at  5-7 days/week   Duration:   Total aerobic exercise = 30-45 min    5-8 min/mode Duration:  Total aerobic exercises = ** min     **min/mode Duration:  Total aerobic exercises = ** min     **min/mode Duration:  Total aerobic exercises = ** min     **min/mode Duration:  Total erobic exercise =  60-90 min   Intensity:   MET Level = 1.99  RPE = 12-15 Intensity:  Max MET Level = **  RPE = 12-15 Intensity:  Max MET Level = **  RPE = 12-15 Intensity:  Max MET Level = **  RPE = 12-15 Intensity:  Max MET Level = ** RPE = 12-15   Progression: increase aerobic activity up to 15 min over next 4 weeks by increasing time 2-3 min/week. Progression:   Progression:   Progression: Progression:  Increase time/intensity when RPE <13, and HR is in [facility]   [x] Yes      [] No  Upper and Lower body strength training 2x/wk    Wt: 2#       Reps:  8-15    *Increase wt. after completing 15 reps with an RPE of <12/13. [] Yes      [] No  Upper and Lower body strength training 2x/wk    Wt: **#       Reps:  8-15    *Increase wt. after completing 15 reps with an RPE of <12/13. [] Yes      [] No  Upper and Lower body strength training 2x/wk    Wt: **#       Reps:  8-15    *Increase wt. after completing 15 reps with an RPE of <12/13. [] Yes      [] No  Upper and Lower body strength training 2x/wk    Wt: **#       Reps:  8-15    *Increase wt. after completing 15 reps with an RPE of <12/13. Continue Strength Training at home   [] Exercise Log & Strength training handout given     Wt: **#       Reps:  8-15    *Increase wt. after completing 15 reps with an RPE of <12/13.    Flexibility Flexibility Flexibility Flexibility Flexibility   [x] Yes      [] No  25 min session of Core Strength & Flexibility 1x/per week  Attends Core Strength & Flexibility   [] Yes      [] No Attends Core Strength & Flexibility   [] Yes      [] No Attends Core Strength & Flexibility   [] Yes      [] No Continue Core Strength & Flexibility at home   Home Exercise  *Brandon Mehta planning to start walking 2 days/week for 10 min on days not in rehab. Home Exercise  *Rufina verbalizes planning to ** ** days/week for ** min on days not in rehab. Home Exercise  *Rufina verbalizes planning to ** ** days/week for ** min on days not in rehab. Home Exercise  *Rufina verbalizes planning to ** ** days/week for ** min on days not in rehab. Home Exercise  *Rufina verbalizes his/her plan to ** ** days/week for ** min @ **   *Education* *Education* *Education* *Education* *Education*   RPE Scale  [x] Yes      [] No  Exercise Safety  [x] Yes      [] No  Equipment Orientation  [x] Yes      [] No  S/S to Report  [x] Yes      [] No  Warm Up/Cool Down  [x] Yes      [] No  Home Exercise  [x] Yes      [] No    All Exercise Education Completed  [] Yes      [] No   Exercise Education Recommended    Workshops  [x] Improving Performance  [x] Balance Training & Fall Prevention  [x] Exercise Biomechanics  [x] Resistance Training & Core Strength Exercise Education Attended/Date Exercise Education Attended/Date Exercise Education Attended/Date All Sessions Completed?     [] Yes  [] No   *Goals* *Goals* *Goals* *Goals* *Goals*   Initial Exercise Goals Exercise Goals  Exercise Goals   Exercise Goals  Exercise Goals   Rufina plans to:  [x] Attend exercise sessions 3x/wk  [x] initiate home exercise 2-3x/wk for 10-20 min  [x] Increase 6 min walk distance by 10%  [x] Attend Exercise workshops Rufina plans to:  [x] Attend exercise sessions 3x/wk  [x] continue home exercise 2-3x/wk for 20-30 min  [x] Attend Exercise workshops Rufina's plans to:  [x] Attend exercise sessions 3x/wk  [x] continue home exercise 3-4x/wk for 30-45 min  [x] Determine plan of exercise following rehab  [x] Attend Exercise workshops Rufina's plans to:  Wong Kingsley achieved exercise goals?     []  Yes    [] No  If no, why?  **  [] Increased 6 min walk distance by 10%  [] Currently exercising 30-60 min/day, 5-7days/wk   [] Plans to continue exercise on own  [] Plans to join a local fitness center to continue exercise  [] Does not plan to continue to exercise after rehab   Return to ADL or Hobbies:  Henri Fernandez would like to improve strength and endurance so she does not become so short of breath as easily Return to ADL or Hobbies:  Henri Jenkinsop would like to improve strength and endurance so he/she is able to return to ** Return to ADL or Hobbies:  Henri Hoop would like to improve strength and endurance so he/she is able to return to ** Return to ADL or Hobbies:  Henri Fernandez would like to improve strength and endurance so he/she is able to return to ** Return to ADL or Hobbies:  Henri Jenkinsop would like to improve strength and endurance so he/she is able to return to **    *MET level required for above goal:  4.0 METs MET level Achieved:  **METs MET level Achieved:  **METs MET level Achieved:  **METs MET level Achieved:  **METs     Individual Cardiac Treatment Plan - Nutrition  NUTRITION  ASSESSMENT/PLAN NUTRITION  REASSESSMENT NUTRITION   REASSESSMENT NUTRITION   REASSESSMENT NUTRITION  DISCHARGE/FOLLOW-UP   Stages of Change Stages of Change Stages of Change Stages of Change Stages of Change   [] Pre Contemplation  [x] Contemplation  [] Preparation  [] Action  [] Maintenance  [] Relapse [] Pre Contemplation  [] Contemplation  [] Preparation  [] Action  [] Maintenance  [] Relapse [] Pre Contemplation  [] Contemplation  [] Preparation  [] Action  [] Maintenance  [] Relapse [] Pre Contemplation  [] Contemplation  [] Preparation  [] Action  [] Maintenance  [] Relapse [] Pre Contemplation  [] Contemplation  [] Preparation  [] Action  [] Maintenance  [] Relapse   NUTRITION ASSESSMENT NUTRITION ASSESSMENT NUTRITION ASSESSMENT NUTRITION ASSESSMENT NUTRITION ASSESSMENT   Weight Management  Weight: 164.2        Height: 5'0\"   BMI: 32 Weight Management  Weight: **                  Weight Management  Weight: **                  Weight Management  Weight: ** Weight Management  Weight: **                    BMI: **   Eating Plan  Current eating habits: small portion size and low fat foods Eating Plan  Changes: Eating Plan  Changes: Eating Plan  Changes: Eating Plan Improvements:   Alcohol Use  [] none          [] daily  [] weekly      [x] special          Diet Assessment Tool:  RATE YOUR PLATE  *Given to patient to complete and return. Diet Assessment Tool:    Score: **/69       Diet Assessment Tool: RATE YOUR PLATE  Score: **/92   NUTRITION PLAN NUTRITION PLAN NUTRITION PLAN NUTRITION PLAN NUTRITION PLAN   *Interventions* *Interventions* *Interventions* *Interventions* *Interventions*   Initial Survey given Goal Setting Discussion:   [x] Yes      [] No       Follow Up Survey Reviewed & Goals Updated:     Professional Referral  Please check if needed. [] Dietitian Consult   [] Wt. Management Referral  [] Other:  Professional Referral  Please check if needed. [] Dietitian Consult   [] Wt. Management Referral  [] Other: Professional Referral  Please check if needed. [] Dietitian Consult   [] Wt. Management Referral  [] Other: Professional Referral  Please check if needed. [] Dietitian Consult   [] Wt. Management Referral  [] Other: Professional Referral  Please check if needed. [] Dietitian Consult   [] Wt. Management Referral  [] Other:   *Education* *Education* *Education* *Education* *Education*   Nutritional Education Recommended    [x] 1:1 Registered Dietitian    Workshops  [x] Label Reading   [x] Menu  [x] Targeting Nutrition Priorities  [x] Fueling a Healthy Body   Nutritional Education Attended/Date Nutritional Education Attended/Date Nutritional Education Attended/Date All Sessions Completed?     [] Yes  [] No ASSESSMENT PSYCHOSOCIAL ASSESSMENT PSYCHOSOCIAL ASSESSMENT PSYCHOSOCIAL ASSESSMENT   Behavioral Outcomes Behavioral Outcomes Behavioral Outcomes Behavioral Outcomes Behavioral Outcomes   Tool Used:  Magdalena Almeida, Quality of Life Index, Cardiac Version IV  *Given to patient to complete. Tool Used:    Magdalena & Juan Pablo, Quality of Life Index, Cardiac Version IV     QOL Index Score: **  HF:**  S&E:**  P&S: **  Family: **   Tool Used:     Magdalena & Juan Pablo, Quality of Life Index, Cardiac Version IV    QOL Index Score: **  HF:**  S&E:**  P&S: **  Family: **   PHQ-9 score 2  Depression Severity  [x]Minimal  []Mild   []Moderate  []Moderately Severe  []Severe    PHQ-9 score **  Depression Severity  []Minimal  []Mild   []Moderate  []Moderately Severe []Severe   Does patient have Family Support? [x] Yes      [] No  No signs of marital/family distress       Within the Past Month:  *Have you wished you were dead or wished you could go to sleep and not wake up? [] Yes      [x] No  *Have you had any thoughts of killing yourself? [] Yes      [x] No         Using a scale of 0-10, 0=none, 10=very:   Rate your depression: 2  Rate your anxiety:  5  Using a scale of 0-10, 0=none, 10=very:   Rate your depression: **  Rate your anxiety:  ** Using a scale of 0-10, 0=none, 10=very:   Rate your depression: **  Rate your anxiety:  ** Using a scale of 0-10, 0=none, 10=very:   Rate your depression: **  Rate your anxiety:  ** Using a scale of 0-10, 0=none, 10=very:   Rate your depression: **  Rate your anxiety:  **   Signs and Symptoms of Depression Present? [] Yes      [x] No   Signs and Symptoms of Depression Present? [] Yes      [] No  If yes, please explain:  ** Signs and Symptoms of Depression Present? [] Yes      [] No  If yes, please explain:  ** Signs and Symptoms of Depression Present? [] Yes      [] No  If yes, please explain:  ** Signs and Symptoms of Depression Present?     [] Yes      [] No  If yes, please explain: **   Signs and Symptoms of Anxiety Present? [x] Yes      [] No  If yes, please explain:  Pt stated that she has panic attacks sometimes. Signs and Symptoms of Anxiety Present? [] Yes      [] No  If yes, please explain:  ** Signs and Symptoms of Anxiety Present? [] Yes      [] No  If yes, please explain:  ** Signs and Symptoms of Anxiety Present? [] Yes      [] No  If yes, please explain:  ** Signs and Symptoms of Anxiety Present? [] Yes      [] No  If yes, please explain:  **   [] Patient has poor eye contact   [] Flat affect present. [] Signs of anxiety, anger or hostility    [] Signs social isolation present. []  Signs of alcohol or substance abuse       PSYCHOSOCIAL PLAN PSYCHOSOCIAL PLAN PSYCHOSOCIAL PLAN PSYCHOSOCIAL PLAN PSYCHOSOCIAL PLAN   *Interventions* *Interventions* *Interventions* *Interventions* *Interventions*   *Please check if needed  [] Psych Consult  [] Physician Referral  [] Stress Management Skills *Please check if needed  [] Psych Consult  [] Physician Referral  [] Stress Management Skills *Please check if needed  [] Psych Consult  [] Physician Referral  [] Stress Management Skills *Please check if needed  [] Psych Consult  [] Physician Referral  [] Stress Management Skills *Please check if needed  [] Psych Consult  [] Physician Referral  [] Stress Management Skills   Is patient currently taking anti-depressant or anti-anxiety medications? [x] Yes      [] No  If yes, please list medications:  venlafaxine Change in anti-depressant or anti-anxiety medications? [] Yes      [] No  If yes, please list medications:  ** Change in anti-depressant or anti-anxiety medications? [] Yes      [] No  If yes, please list medications:  ** Change in anti-depressant or anti-anxiety medications? [] Yes      [] No  If yes, please list medications:  ** Change in anti-depressant or anti-anxiety medications?   [] Yes      [] No  If yes, please list medications:  **   *Education* *Education* *Education* *Education* *Education*   Healthy Mind-Set Workshops Recommended  [x] Stress & Health  [x] Taking Charge of Stress  [x] New Thoughts, New Behaviors  [x] Managing Moods & Relationships Healthy Mind-Set Workshops Attended/Date Healthy Mind-Set Workshops Attended/Date Healthy Mind-Set Workshops Attended/Date Healthy Mind-Set Workshops  Completed  [] Yes      [] No   *Goals* *Goals* *Goals* *Goals* *Goals*   Wing psychosocial goals are as follows:  Complete HADS & Magdalena Almeida, Quality of Life Index, Cardiac Version IV Wing psychosocial goals are as follows:  [] Attend Healthy Mind-Set Workshops  [] Reduce depression symptom severity by 1 level  [] ** Wing psychosocial goals are as follows:  [] Attend Healthy Mind-Set Workshops  [] Reduce depression symptom severity by 1 level  [] ** Wing psychosocial goals are as follows:  [] Attend Healthy Mind-Set Workshops  [] Reduce depression symptom severity by 1 level  [] ** Rufina achieved psychosocial goals?   [] Yes    [] No  If no, why?  **  [] Use methods learned to continue to reduce depression symptom severity by 1 level  [] **     Individual Cardiac Treatment Plan - Other:  Risk Factor/Education  RISK FACTOR  ASSESSMENT/PLAN RISK FACTOR  REASSESSMENT  RISK FACTOR  REASSESSMENT RISK FACTOR  REASSESSMENT RISK FACTOR   DISCHARGE/FOLLOW-UP   Stages of Change Stages of Change Stages of Change Stages of Change Stages of Change   [] Pre Contemplation  [x] Contemplation  [] Preparation  [] Action  [] Maintenance  [] Relapse [] Pre Contemplation  [] Contemplation  [] Preparation  [] Action  [] Maintenance  [] Relapse [] Pre Contemplation  [] Contemplation  [] Preparation  [] Action  [] Maintenance  [] Relapse [] Pre Contemplation  [] Contemplation  [] Preparation  [] Action  [] Maintenance  [] Relapse [] Pre Contemplation  [] Contemplation  [] Preparation  [] Action  [] Maintenance  [] Relapse   RISK FACTOR/EDUCATION ASSESSMENT RISK FACTOR/EDUCATION ASSESSMENT RISK FACTOR/EDUCATION ASSESSMENT RISK FACTOR/EDUCATION ASSESSMENT RISK FACTOR /EDUCATION ASSESSMENT   Hypertension  [x] Yes      [] No    Resting BP: 98/74  Peak Ex BP:110/68  Medication: metoprolol   Hypertension  Resting BP: **  Peak Ex BP:**  Medication Changes:  [] Yes      [] No Hypertension  Resting BP: **  Peak Ex BP:**  Medication Changes:  [] Yes      [] No Hypertension  Resting BP: **  Peak Ex BP:**  Medication Changes:  [] Yes      [] No Hypertension  Resting BP: **  Peak Ex BP:**  Medication Changes:  [] Yes      [] No   Lipids  HLD/DLD  [x] Yes      [] No  TOTAL CHOL: 119  HDL: 31  LDL:  71  TRI  Medication: atorvastatin Lipids  Medication Changes:  [] Yes      [] No     Lipids  Medication Changes:  [] Yes      [] No     Lipids  Medication Changes:  [] Yes      [] No     Lipids    TOTAL CHOL: **  HDL:  **  LDL:  **  TRIG:  **  Medication Changes:  [] Yes      [] No   Diabetes  [] Yes      [x] No  FBS: 108           HbA1C: 5.6        Monitor BS @ home:   [] Yes      [x] No   Diabetes  Most Recent BS:  BS have been in range  [] Yes      [] No  Medication Changes  [] Yes      [] No   Diabetes  Most Recent BS:  BS have been in range  [] Yes      [] No  Medication Changes  [] Yes      [] No     Diabetes  Most Recent BS:  BS have been in range  [] Yes      [] No  Medication Changes  [] Yes      [] No     Diabetes  Most Recent BS:  BS have been in range  [] Yes      [] No  Medication Changes  [] Yes      [] No       Tobacco Use  [] Current  [] Former  [x] Never   Tobacco Use  Change in smoking status   [] Yes      [] No    Quit date: **   Tobacco Use  Change in smoking status   [] Yes      [] No    Quit date: **   Tobacco Use  Change in smoking status   [] Yes      [] No    Quit date: ** Tobacco Use  Change in smoking status   [] Yes      [] No    Quit date: **            Learning Barriers  Please select one:  [] Speech  [] Literacy  [] Hearing  [] Cognitive  [] Vision  [x] Ready to Learn Learning Barriers Addressed:   [] Yes      [] No   Learning Barriers Addressed:   [] Yes      [] No   Learning Barriers Addressed:  [] Yes      [] No Learning Barriers Addressed:  [] Yes      [] No     RISK FACTOR/EDUCATION PLAN RISK FACTOR/EDUCATION PLAN RISK FACTOR/EDUCATION PLAN RISK FACTOR/EDUCATION PLAN RISK FACTOR/EDUCATION PLAN   *Interventions* *Interventions* *Interventions* *Interventions* *Interventions*   Recommended Educational Videos    [x] Overview of The [employer]rit[employer]in Eating Plan  [x] Becoming A Pritikin   [x] Diseases of Our Time-Part 1  [x] Calorie Density  [x] Label Reading-Part 1  [x] Move It! [x] Healthy Minds, Bodies, Hearts  [x] Dining Out-Part 1  [x] Heart Disease Risk Reduction  [x] Metabolic Syndrome & Belly Fat  [x] Facts on Fat  [x] Diseases of Our Times-Part 2  [x] Biology of Weight Control  [x] Biomechanical Limitations  [x] Nurtition Action Plan   Completed Videos/Date      7/2/2020  Overview of The [employer] Eating Plan Completed Videos/Date Completed Videos/Date Recommended Educational Videos Completed    [] Yes      [] No    **If not completed, Why? **          Smoking Cessation/Relaspe Prevention Intervention needed? [] Yes      [x] No   Smoking Cessation/Relapse Prevention Scheduled? [] Yes      [] No  Date:  ** Smoking Cessation/Relapse Prevention completed?    [] Yes      [] No  Date: **    Smoking Cessation Counseling attended  [] Yes      [] No  **If not completed, Why? **   Professional Referrals:  *Please check if needed  [] Diabetes Clinic  [] Lipid Clinic   [] Other:     Professional Referrals:  *Please check if needed  [] Diabetes Clinic  [] Lipid Clinic   [] Other:   Preventative Medication Preventative Medication Preventative Medication Preventative Medication Preventative Medication   Aspirin  [x] Yes    [] No  Blood Thinner: Clopidogrel/Effient/Brillinta  [x] Yes    [] No  Beta Blocker  [x] Yes    [] No  Ace Inhibitor  [] Yes    [x] No  Statin/Lipid Lowering  [x] Yes

## 2020-07-02 NOTE — PROGRESS NOTES
Video Education Report - ICR/CR    Name:  Jaswant Poon     Date:  7/2/2020  MRN: [id number]     Session #:  1  Session Length: 40 min    Recommended Videos        []01 [employer] - Program Overview   34:22    [x]02 Overview of Pritikin Eating Plan   34:10    []03 Becoming a Glennda Flavors   33:08     []04 Diseases of Our Time - Part 1   34:22    []05 Calorie Density     33:39   []06 Label Reading - Part 1    32:15   []07 Move it      32.54   []08 Healthy Minds, Bodies, Hearts   32:14   []09 Dining Out - Part 1    32:28   []10 Heart Disease Risk Reduction   39:84   []94 Metabolic Syndrome and Belly Fat  31:52   []12 Facts on Fat     35:29   []13 Diseases of Our Time - Part 2   33:07   []14 Biology of Weight Control   32:36   []15 Biomechanical Limitations   35:20   []16 Nutrition Action Plan    34:23      Comments:  Video completed

## 2020-07-06 ENCOUNTER — HOSPITAL ENCOUNTER (OUTPATIENT)
Dept: CARDIAC REHAB | Age: 75
Setting detail: THERAPIES SERIES
Discharge: HOME | End: 2020-07-06
Payer: MEDICARE

## 2020-07-06 PROCEDURE — G0422 INTENS CARDIAC REHAB W/EXERC: HCPCS

## 2020-07-06 PROCEDURE — G0423 INTENS CARDIAC REHAB NO EXER: HCPCS

## 2020-07-06 NOTE — PROGRESS NOTES
Video Education Report - ICR/CR    Name:  Yuliet Bonilla     Date:  7/6/2020  MRN: [id number]     Session #:    Session Length: 40 min    Recommended Videos        []01 [employer] - Program Overview   34:22    []02 Overview of Pritikin Eating Plan   34:10    []03 Becoming a Pritikin    33:08     []04 Diseases of Our Time - Part 1   34:22    []05 Calorie Density     33:39   []06 Label Reading - Part 1    32:15   []07 Move it      32.54   []08 Healthy Minds, Bodies, Hearts   32:14   []09 Dining Out - Part 1    32:28   [x]10 Heart Disease Risk Reduction   78:84   []41 Metabolic Syndrome and Belly Fat  31:52   []12 Facts on Fat     35:29   []13 Diseases of Our Time - Part 2   33:07   []14 Biology of Weight Control   32:36   []15 Biomechanical Limitations   35:20   []16 Nutrition Action Plan    34:23      Comments:  Video completed,

## 2020-07-08 ENCOUNTER — HOSPITAL ENCOUNTER (OUTPATIENT)
Dept: CARDIAC REHAB | Age: 75
Setting detail: THERAPIES SERIES
Discharge: HOME | End: 2020-07-08
Payer: MEDICARE

## 2020-07-08 PROCEDURE — G0422 INTENS CARDIAC REHAB W/EXERC: HCPCS

## 2020-07-08 PROCEDURE — G0423 INTENS CARDIAC REHAB NO EXER: HCPCS

## 2020-07-08 NOTE — PROGRESS NOTES
Hal SÁNCHEZ.:  1945    Acct Number: [de-identified]   MRN:  [id number]                             [facility] NUTRITION WORKSHOP             Date: 2020        Session # _______    Drenda Friend class covered:    ()  Label Reading  ()  Menus  ()  Targeting Nutrition Priorities  (x)  Fueling a Healthy Body    Readiness to change:    ( ) Pre-contemplative   ( ) Contemplative - ambivalent about change    (x ) Action - ready to set action plan and implement   ( ) Maintenance - has made change and is trying, and or practicing different alternative behaviors     Lexie Conrad was in the Workshop with the Dietitian for 45 minutes. The content was presented via Powerpoint, lecture, and patient participation based format. Motivational interviewing was utilized when needed, to promote change. Patient voiced understanding.     Electronically signed by Alesia Mccloud RD LD [id number]  on 2020 at 10:49 AM

## 2020-07-10 ENCOUNTER — HOSPITAL ENCOUNTER (OUTPATIENT)
Dept: CARDIAC REHAB | Age: 75
Setting detail: THERAPIES SERIES
Discharge: HOME | End: 2020-07-10
Payer: MEDICARE

## 2020-07-10 PROCEDURE — G0422 INTENS CARDIAC REHAB W/EXERC: HCPCS

## 2020-07-10 PROCEDURE — G0423 INTENS CARDIAC REHAB NO EXER: HCPCS

## 2020-07-13 ENCOUNTER — HOSPITAL ENCOUNTER (OUTPATIENT)
Dept: CARDIAC REHAB | Age: 75
Setting detail: THERAPIES SERIES
Discharge: HOME | End: 2020-07-13
Payer: MEDICARE

## 2020-07-13 PROCEDURE — G0422 INTENS CARDIAC REHAB W/EXERC: HCPCS

## 2020-07-13 PROCEDURE — G0423 INTENS CARDIAC REHAB NO EXER: HCPCS

## 2020-07-13 NOTE — PROGRESS NOTES
Hospital Facility-Based Program  Phase 2 Cardiac Rehab Weekly Progress Report      Patient prescribed exercise:  9:00 class. 3 times per week in rehab, 1-4 times per week at home for the amount of sessions/weeks specified by insurance. Current Levels: Treadmill: 1. 6mph/0% for 8 minutes, Schwinn Airdyne: Level 0.4 for 8 minutes, NuStep:  30 [place] for 6 minutes, UBE: Level 0.2 for 5 minutes. Progression Discussion: Maintain/Increase Aerobic exercise 10 minutes to work on endurance. Attempt to increase intensity by 5-20% for each modality this week. Try to increase intensities until Iesha Simon rates the exercises a 13-17 on Chelle RPE.

## 2020-07-13 NOTE — PROGRESS NOTES
Video Education Report - ICR/CR    Name:  Laron Khan     Date:  7/13/2020  MRN: [id number]     Session #:  5  Session Length: 50 min    Recommended Videos        []01 [employer] - Program Overview   34:22    []02 Overview of [employer]in Eating Plan   34:10    []03 Becoming a Bobo    33:08     [x]04 Diseases of Our Time - Part 1   34:22    []05 Calorie Density     33:39   []06 Label Reading - Part 1    32:15   []07 Move it      32.54   []08 Healthy Minds, Bodies, Hearts   32:14   []09 Dining Out - Part 1    32:28   []10 Heart Disease Risk Reduction   86:84   []69 Metabolic Syndrome and Belly Fat  31:52   []12 Facts on Fat     35:29   []13 Diseases of Our Time - Part 2   33:07   []14 Biology of Weight Control   32:36   []15 Biomechanical Limitations   35:20   []16 Nutrition Action Plan    34:23    Comments:  Video completed, group discussion

## 2020-07-15 ENCOUNTER — HOSPITAL ENCOUNTER (OUTPATIENT)
Dept: CARDIAC REHAB | Age: 75
Setting detail: THERAPIES SERIES
Discharge: HOME | End: 2020-07-15
Payer: MEDICARE

## 2020-07-15 PROCEDURE — G0422 INTENS CARDIAC REHAB W/EXERC: HCPCS

## 2020-07-15 PROCEDURE — G0423 INTENS CARDIAC REHAB NO EXER: HCPCS

## 2020-07-17 ENCOUNTER — HOSPITAL ENCOUNTER (OUTPATIENT)
Dept: CARDIAC REHAB | Age: 75
Setting detail: THERAPIES SERIES
Discharge: HOME | End: 2020-07-17
Payer: MEDICARE

## 2020-07-17 PROCEDURE — G0422 INTENS CARDIAC REHAB W/EXERC: HCPCS

## 2020-07-17 PROCEDURE — G0423 INTENS CARDIAC REHAB NO EXER: HCPCS

## 2020-07-17 NOTE — PROGRESS NOTES
Avtar SÁNCHEZ.:  1945  Acct Number: [de-identified]  MRN:  [id number]                  [facility] COOKING SCHOOL WORKSHOP             Date: 2020        Session # ________   Vonakanksha Hammonds class covered:      () Adding Flavor     () Fast Breakfasts     () Salads and Dressings     () Soups and Sauces     () Simple Sides     () Appetizers and Snacks     () Delicious Desserts     () Plant Based Proteins     () Fast Evening Meals     () Weekend Breakfasts     () Efficiency Cooking     (X) Meat Alternatives     Patients were shown how to choose, prep, and cook; substitutions and other options were given. Samples were offered. Recipes were given and questions answered. The patient above was in the [employer] for 45 minutes.       Electronically signed by Ren[place] DANIELS [id number] on 2020 at 12:05 PM

## 2020-07-20 ENCOUNTER — HOSPITAL ENCOUNTER (OUTPATIENT)
Dept: CARDIAC REHAB | Age: 75
Setting detail: THERAPIES SERIES
Discharge: HOME | End: 2020-07-20
Payer: MEDICARE

## 2020-07-20 PROCEDURE — G0422 INTENS CARDIAC REHAB W/EXERC: HCPCS

## 2020-07-20 PROCEDURE — G0423 INTENS CARDIAC REHAB NO EXER: HCPCS

## 2020-07-20 NOTE — PROGRESS NOTES
Hospital Facility-Based Program  Phase 2 Cardiac Rehab Weekly Progress Report      Patient prescribed exercise:  9:00 class. 3 times per week in rehab, 1-4 times per week at home for the amount of sessions/weeks specified by insurance. Current Levels: Treadmill: 2.0mph/0% for 1 minutes, Schwinn Airdyne: Level 0.5 for 12 minutes,  UBE: Level 0.3 for 5 minutes. Progression Discussion: Maintain/Increase Aerobic exercise 15 minutes to work on endurance. Attempt to increase intensity by 5-20% for each modality this week. Try to increase intensities until Iesha Simon rates the exercises a 13-17 on Chelle RPE.

## 2020-07-20 NOTE — PROGRESS NOTES
Jaswant SÁNCHEZ.:  1945    MRN:  [id number]    Date: 2020      Session Length:  40 min   Session # _______    EXERCISE WORKSHOP:  Improving Performance                        Todays class addressed ways to build on the foundation of patient's core exercise program to achieve greater health benefits. Discussion of the SAID (Specific Adaptation to Imposed Demands) and FITT (Frequency, Intensity, Time, and Type) principles, and how these principles impact fitness levels. Techniques on overcoming muscle soreness and safety concerns was also addressed. In addition, the importance of a varied weekly exercise routine in order to improve overall fitness level was also discussed. Readiness to change:    ( ) Pre-contemplative   ( ) Contemplative - ambivalent about change    ( ) Action - ready to set action plan and implement   ( ) Maintenance - has made change and is trying, and or practicing different alternative behaviors     Additional Notes:      Dequan Dobbins was in the Workshop with the Exercise Physiologist for 40 minutes. The content was presented via Powerpoint, lecture, and patient participation based format. Motivational interviewing was utilized when needed, to promote change. Patient voiced understanding.     Electronically signed by Barak Phillips on 2020 at 10:41 AM

## 2020-07-22 ENCOUNTER — HOSPITAL ENCOUNTER (OUTPATIENT)
Dept: CARDIAC REHAB | Age: 75
Setting detail: THERAPIES SERIES
Discharge: HOME | End: 2020-07-22
Payer: MEDICARE

## 2020-07-22 PROCEDURE — G0422 INTENS CARDIAC REHAB W/EXERC: HCPCS

## 2020-07-22 PROCEDURE — G0423 INTENS CARDIAC REHAB NO EXER: HCPCS

## 2020-07-22 NOTE — PROGRESS NOTES
Video Education Report - ICR/CR    Name:  Arnel Washington     Date:  7/22/2020  MRN: [id number]     Session #:    Session Length: 40 min    Recommended Videos        []01 [employer] - Program Overview   34:22    []02 Overview of [employer] Eating Plan   34:10    []03 Becoming a Pritikin    33:08     []04 Diseases of Our Time - Part 1   34:22    [x]05 Calorie Density     33:39   []06 Label Reading - Part 1    32:15   []07 Move it      32.54   []08 Healthy Minds, Bodies, Hearts   32:14   []09 Dining Out - Part 1    32:28   []10 Heart Disease Risk Reduction   99:14   []08 Metabolic Syndrome and Belly Fat  31:52   []12 Facts on Fat     35:29   []13 Diseases of Our Time - Part 2   33:07   []14 Biology of Weight Control   32:36   []15 Biomechanical Limitations   35:20   []16 Nutrition Action Plan    34:23        Comments:  Video completed,

## 2020-07-24 ENCOUNTER — HOSPITAL ENCOUNTER (OUTPATIENT)
Dept: CARDIAC REHAB | Age: 75
Setting detail: THERAPIES SERIES
Discharge: HOME | End: 2020-07-24
Payer: MEDICARE

## 2020-07-24 PROCEDURE — G0422 INTENS CARDIAC REHAB W/EXERC: HCPCS

## 2020-07-24 PROCEDURE — G0423 INTENS CARDIAC REHAB NO EXER: HCPCS

## 2020-07-27 ENCOUNTER — HOSPITAL ENCOUNTER (OUTPATIENT)
Dept: CARDIAC REHAB | Age: 75
Setting detail: THERAPIES SERIES
Discharge: HOME | End: 2020-07-27
Payer: MEDICARE

## 2020-07-27 PROCEDURE — G0423 INTENS CARDIAC REHAB NO EXER: HCPCS

## 2020-07-27 PROCEDURE — G0422 INTENS CARDIAC REHAB W/EXERC: HCPCS

## 2020-07-27 NOTE — PROGRESS NOTES
Hospital Facility-Based Program  Phase 2 Cardiac Rehab Weekly Progress Report      Patient prescribed exercise:  9:00 class. 3 times per week in rehab, 1-4 times per week at home for the amount of sessions/weeks specified by insurance. Current Levels: Treadmill: 2. 2mph/0% for 12 minutes, Schwinn Airdyne: Level 0.5 for 12 minutes, UBE: Level 0.4 for 5 minutes. Progression Discussion: Maintain/Increase Aerobic exercise 15 minutes to work on endurance. Attempt to increase intensity by 5-20% for each modality this week. Try to increase intensities until Levis Meigs rates the exercises a 13-17 on Chelle RPE.

## 2020-07-27 NOTE — PROGRESS NOTES
Video Education Report - ICR/CR    Name:  Seb Gr     Date:  7/27/2020  MRN: [id number]     Session #:  11  Session Length: 45 min    Recommended Videos        []01 [employer] - Program Overview   34:22    []02 Overview of [employer]in Eating Plan   34:10    []03 Becoming a Frank Valladares   33:08     []04 Diseases of Our Time - Part 1   34:22    []05 Calorie Density     33:39   [x]06 Label Reading - Part 1    32:15   []07 Move it      32.54   []08 Healthy Minds, Bodies, Hearts   32:14   []09 Dining Out - Part 1    32:28   []10 Heart Disease Risk Reduction   12:90   []34 Metabolic Syndrome and Belly Fat  31:52   []12 Facts on Fat     35:29   []13 Diseases of Our Time - Part 2   33:07   []14 Biology of Weight Control   32:36   []15 Biomechanical Limitations   35:20   []16 Nutrition Action Plan    34:23      Comments:  Video completed, group discussion

## 2020-07-29 ENCOUNTER — HOSPITAL ENCOUNTER (OUTPATIENT)
Dept: CARDIAC REHAB | Age: 75
Setting detail: THERAPIES SERIES
Discharge: HOME | End: 2020-07-29
Payer: MEDICARE

## 2020-07-29 PROCEDURE — G0422 INTENS CARDIAC REHAB W/EXERC: HCPCS

## 2020-07-29 PROCEDURE — G0423 INTENS CARDIAC REHAB NO EXER: HCPCS

## 2020-07-29 NOTE — PLAN OF CARE
[address] [place] [address] Facility-Based Program  Individualized Cardiac Treatment Plan    Patient Name:  Jay Murguia  :  1945  Age:  69 y.o. MRN:  [id number]  Diagnosis: PCI & STEMI  Date of Event: 2020   Physician:  Lenny Sequeira  Next Office Visit: 2020   Date Entered Program: 2020  Risk Stratifications: [] Low [x] Intermediate [] High  Allergies: No Known Allergies    COVID -19 Screen  Do you have any of the following symptoms:  [] Fever [] Cough [] SOB [] Muscle/Body Ache [] Loss of taste/smell [x] None    Have you traveled outside of the [place]? [] Yes      [x] No    Have you been around anyone who has tested Positive for COVID 19?  [] Yes      [x] No    The following Education has been completed with patient. [x] Wait in the lobby until we call you back to rehab. [x] You must wear a mask while in the medical center, and in cardiac rehab. Please bring your own. [x] Bring your own bottle of water with you. [x] You can not bring anyone with you into the medical center at this time. They are welcome to sit in their car and wait for you.     Individual Cardiac Treatment Plan -EXERCISE  INITIAL 30 DAY 60 DAY 90 DAY FINAL DAY   EXERCISE  ASSESSMENT/PLAN EXERCISE  REASSESSMENT EXERCISE   REASSESSMENT EXERCISE   REASSESSMENT EXERCISE  DISCHARGE/FOLLOW-UP   Date: 2020 Date: 2020 Date: Date: Date:   Session #1 Session # 25 Session # ** Session # ** Session # **  Last session completed on **   Stages of Change Stages of Change Stages of Change Stages of Change Stages of Change   [] Pre Contemplation  [] Contemplation  [x] Preparation  [] Action  [] Maintenance  [] Relapse [] Pre Contemplation  [] Contemplation  [x] Preparation  [] Action  [] Maintenance  [] Relapse [] Pre Contemplation  [] Contemplation  [] Preparation  [] Action  [] Maintenance  [] Relapse [] Pre Contemplation  [] Contemplation  [] Preparation  [] Action  [] Maintenance  [] Relapse [] Pre Contemplation  [] Contemplation  [] Preparation  [] Action  [] Maintenance  [] Relapse   EXERCISE ASSESSMENT EXERCISE ASSESSMENT EXERCISE ASSESSMENT EXERCISE ASSESSMENT EXERCISE ASSESSMENT   6 Min Walk Test  Distance walked:   0.13 miles  686 ft.  1.99 METs  Max HR:109 BPM      RPE:  12  THR:  112-126  Rhythm:  NSR    6 Min Walk Test  Distance walked:   ** miles  ** ft  ** METs  Max HR:** BPM      RPE:  **  %Change ft= **    Rhythm:  **   DASI: 4.6 METs DASI: 5.1 METs DASI: ** METs DASI: ** METs DASI: ** METs   Return to Work  [employer] on returning to work? 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 [] Yes    [] No    Return to work date set? [] Yes, **    [] No    Benson Schlatter is doing ** at work. Return to work:  Has Benson Schlatter returned to work? [] Yes    [] No    Return to work date set? [] Yes, **    [] No    Benson Schlatter is doing ** at work. Return to work:  Has Benson Schlatter returned to work? [] Yes    [] No    Return to work? [] Yes, **    [] No    *Required MET Level achieved for job duties? [] Yes    [] No   Orthopedic Limitations/  [x] Yes    [] No  If yes please list:  Both hips replaced, and foot problems      Orthopedic Limitations  *If patient has orthopedic issue:   Actions/  accomodations needed to make Rufina successful : Anabel MENDEZ is doing well  Orthopedic Limitations   Orthopedic Limitations   Orthopedic Limitations     Fall Risk  Fall risk assessed? [x] Yes      [] No    Balance Issues?   [] Yes      [x] No     [] Walker [] Cane    [x] Safety issues reviewed      Fall Risk  *If patient is a fall risk, action needed to accommodate: ANDREA Fall Risk Fall Risk Fall Risk   Home Exercise  [] Yes    [x] No   Home Exercise  [] Yes    [x] No   Home Exercise  [] Yes    [] No  Type: **  Frequency: **  Duration: ** Home Exercise  [] Yes    [] No  Type: **  Frequency: **  Duration: ** Home Exercise  [] Yes    [] No  Type: **  Frequency: **  Duration: **   Angina with Activity? [] Yes    [x] No     Angina with Activity? [] Yes    [x] No   Angina with Activity? [] Yes    [] No  Angina Management: ** Angina with Activity? [] Yes    [] No  Angina Management: ** Angina with Activity?   [] Yes    [] No  Angina Management: **   EXERCISE PLAN EXERCISE PLAN EXERCISE PLAN EXERCISE PLAN EXERCISE PLAN   *Interventions* *Interventions* *Interventions* *Interventions* *Interventions*   Exercise Prescription  (per physician & CR staff) Exercise Prescription  (per physician & CR staff) Exercise Prescription  (per physician & CR staff) Exercise Prescription  (per physician & CR staff) Exercise Prescription  (per physician & CR staff)   Cardiovascular Cardiovascular Cardiovascular Cardiovascular Cardiovascular   Mode:    [x] Treadmill (TM)  [x] Schwinn Airdyne (AD)  [x] Arms Ergometer (AE)   MODE:    [x] Treadmill (TM)  [x] Schwinn Airdyne (AD)  [x] Arms Ergometer (AE)  [x] NuStep   MODE:    [] Treadmill (TM)  [] Schwinn Airdyne (AD)  [] Arms Ergometer (AE)  [] NuStep  [] Elliptical (E) MODE:    [] Treadmill (TM)  [] Schwinn Airdyne (AD)  [] Arms Ergometer (AE)  [] NuStep  [] Elliptical (E) MODE:    [] Treadmill (TM)  [] Schwinn Airdyne (AD)  [] Arms Ergometer (AE)  [] NuStep  [] Elliptical (E)   Initial Workloads  TM: [email or web address] 2.1 METs  AD: 0.5 level =1.9 METs  NS:26  Navarro=1.9 METs  AE: 0.2 level = 1.3 METs Current Workloads  TM: 2.2 @ 0%= 2.7 METs  AD: 0.6 level = 2.2 METs  NS:   38Watts= 2.2 METs  AE: 0.5 level = 2.0 METs Current Workloads  TM:  @ %=  METs  AD:  level =  METs  NS:   Navarro=  METs  AE:  level =  METs Current Workloads  TM:  @ %=  METs  AD:  level =  METs  NS:   Navarro=  METs  AE:  level =  METs Current Workloads  TM:  @ %=  METs  AD:  level =  METs  NS:   Navarro=  METs  AE:  level =  METs     Frequency:    ICR: 3x/week  Home: 2-3x/wk Frequency:   ICR: 3x/week  Home: 3x/wk Frequency:  ICR: 3x/week  Home: 3-4x/wk Frequency:  ICR: 3x/week  Home: 3-4x/wk Frequency:  Agnes Montenegro will continue exercise at  5-7 days/week   Duration:   Total aerobic exercise = 30-45 min    5-8 min/mode Duration:  Total aerobic exercises = 35 min     15min/mode Duration:  Total aerobic exercises = ** min     **min/mode Duration:  Total aerobic exercises = ** min     **min/mode Duration:  Total erobic exercise =  60-90 min   Intensity:   MET Level = 1.99  RPE = 12-15 Intensity:  Max MET Level = 2.7  RPE = 12-15 Intensity:  Max MET Level = **  RPE = 12-15 Intensity:  Max MET Level = **  RPE = 12-15 Intensity:  Max MET Level = ** RPE = 12-15   Progression: increase aerobic activity up to 15 min over next 4 weeks by increasing time 2-3 min/week. Progression: increase aerobic intensity as tolerated by the pt   Progression:   Progression: Progression:  Increase time/intensity when RPE <13, and HR is in [facility]   [x] Yes      [] No  Upper and Lower body strength training 2x/wk    Wt: 2#       Reps:  8-15    *Increase wt. after completing 15 reps with an RPE of <12/13. [x] Yes      [] No  Upper and Lower body strength training 2x/wk    Wt: 3#       Reps:  8-15    *Increase wt. after completing 15 reps with an RPE of <12/13. [] Yes      [] No  Upper and Lower body strength training 2x/wk    Wt: **#       Reps:  8-15    *Increase wt. after completing 15 reps with an RPE of <12/13. [] Yes      [] No  Upper and Lower body strength training 2x/wk    Wt: **#       Reps:  8-15    *Increase wt. after completing 15 reps with an RPE of <12/13. Continue Strength Training at home   [] Exercise Log & Strength training handout given     Wt: **#       Reps:  8-15    *Increase wt. after completing 15 reps with an RPE of <12/13.    Flexibility Flexibility Flexibility Flexibility Flexibility   [x] Yes      [] No  25 min session of Core Strength & Flexibility 1x/per plan of exercise following rehab  [x] Attend Exercise workshops Rufina's plans to:  Manjinder Ahmadi achieved exercise goals? []  Yes    [] No  If no, why?  **  [] Increased 6 min walk distance by 10%  [] Currently exercising 30-60 min/day, 5-7days/wk   [] Plans to continue exercise on own  [] Plans to join a local fitness center to continue exercise  [] Does not plan to continue to exercise after rehab   Return to ADL or Hobbies:  Nemiah Most would like to improve strength and endurance so she does not become so short of breath as easily Return to ADL or Hobbies:  Nemiah Most would like to improve strength and endurance so she is able to return to walk longer distances without being so short of breath  Return to ADL or Hobbies:  Nemiah Most would like to improve strength and endurance so he/she is able to return to ** Return to ADL or Hobbies:  Nemiah Most would like to improve strength and endurance so he/she is able to return to ** Return to ADL or Hobbies:  Nemiah Most would like to improve strength and endurance so he/she is able to return to **    *MET level required for above goal:  4.0 METs MET level Achieved:  4. 0METs MET level Achieved:  **METs MET level Achieved:  **METs MET level Achieved:  **METs     Individual Cardiac Treatment Plan - Nutrition  NUTRITION  ASSESSMENT/PLAN NUTRITION  REASSESSMENT NUTRITION   REASSESSMENT NUTRITION   REASSESSMENT NUTRITION  DISCHARGE/FOLLOW-UP   Stages of Change Stages of Change Stages of Change Stages of Change Stages of Change   [] Pre Contemplation  [x] Contemplation  [] Preparation  [] Action  [] Maintenance  [] Relapse [] Pre Contemplation  [] Contemplation  [x] Preparation  [] Action  [] Maintenance  [] Relapse [] Pre Contemplation  [] Contemplation  [] Preparation  [] Action  [] Maintenance  [] Relapse [] Pre Contemplation  [] Contemplation  [] Preparation  [] Action  [] Maintenance  [] Relapse [] Pre Contemplation  [] Contemplation  [] Preparation  [] Action  [] Maintenance  [] Relapse NUTRITION ASSESSMENT NUTRITION ASSESSMENT NUTRITION ASSESSMENT NUTRITION ASSESSMENT NUTRITION ASSESSMENT   Weight Management  Weight: 164.2        Height: 5'0\"   BMI: 32 Weight Management  Weight: 161.8                  Weight Management  Weight: **                  Weight Management  Weight: ** Weight Management  Weight: **                    BMI: **   Eating Plan  Current eating habits: small portion size and low fat foods Eating Plan  Changes: less salt no red meat less sugar and more fruits and veggies Eating Plan  Changes: Eating Plan  Changes: Eating Plan Improvements:   Alcohol Use  [] none          [] daily  [] weekly      [x] special          Diet Assessment Tool:  RATE YOUR PLATE  *Given to patient to complete and return. Diet Assessment Tool:    Score: did not return /69       Diet Assessment Tool: RATE YOUR PLATE  Score: **/01   NUTRITION PLAN NUTRITION PLAN NUTRITION PLAN NUTRITION PLAN NUTRITION PLAN   *Interventions* *Interventions* *Interventions* *Interventions* *Interventions*   Initial Survey given Goal Setting Discussion:   [x] Yes      [] No       Follow Up Survey Reviewed & Goals Updated:     Professional Referral  Please check if needed. [] Dietitian Consult   [] Wt. Management Referral  [] Other:  Professional Referral  Please check if needed. [] Dietitian Consult   [] Wt. Management Referral  [] Other: Professional Referral  Please check if needed. [] Dietitian Consult   [] Wt. Management Referral  [] Other: Professional Referral  Please check if needed. [] Dietitian Consult   [] Wt. Management Referral  [] Other: Professional Referral  Please check if needed. [] Dietitian Consult   [] Wt.  Management Referral  [] Other:   *Education* *Education* *Education* *Education* *Education*   Nutritional Education Recommended    [x] 1:1 Registered Dietitian    Workshops  [x] Label Reading   [x] Menu  [x] Targeting Nutrition Priorities  [x] Fueling a Healthy Body   Nutritional Education Preparation  [] Action  [] Maintenance  [] Relapse [] Pre Contemplation  [] Contemplation  [] Preparation  [] Action  [] Maintenance  [] Relapse [] Pre Contemplation  [] Contemplation  [] Preparation  [] Action  [] Maintenance  [] Relapse   PSYCHOSOCIAL ASSESSMENT PSYCHOSOCIAL ASSESSMENT PSYCHOSOCIAL ASSESSMENT PSYCHOSOCIAL ASSESSMENT PSYCHOSOCIAL ASSESSMENT   Behavioral Outcomes Behavioral Outcomes Behavioral Outcomes Behavioral Outcomes Behavioral Outcomes   Tool Used:  Magdalena & Juan Pablo, Quality of Life Index, Cardiac Version IV  *Given to patient to complete. Tool Used:    Magdalena & Juan Pablo, Quality of Life Index, Cardiac Version IV     QOL Index Score: 28.64  HF:28.62  S&E:30  P&S: 28.07  Family: 27.6   Tool Used:     Magdalena & Juan Pablo, Quality of Life Index, Cardiac Version IV    QOL Index Score: **  HF:**  S&E:**  P&S: **  Family: **   PHQ-9 score 2  Depression Severity  [x]Minimal  []Mild   []Moderate  []Moderately Severe  []Severe    PHQ-9 score **  Depression Severity  []Minimal  []Mild   []Moderate  []Moderately Severe []Severe   Does patient have Family Support? [x] Yes      [] No  No signs of marital/family distress       Within the Past Month:  *Have you wished you were dead or wished you could go to sleep and not wake up? [] Yes      [x] No  *Have you had any thoughts of killing yourself? [] Yes      [x] No         Using a scale of 0-10, 0=none, 10=very:   Rate your depression: 2  Rate your anxiety:  5  Using a scale of 0-10, 0=none, 10=very:   Rate your depression: 0  Rate your anxiety:  1 Using a scale of 0-10, 0=none, 10=very:   Rate your depression: **  Rate your anxiety:  ** Using a scale of 0-10, 0=none, 10=very:   Rate your depression: **  Rate your anxiety:  ** Using a scale of 0-10, 0=none, 10=very:   Rate your depression: **  Rate your anxiety:  **   Signs and Symptoms of Depression Present? [] Yes      [x] No   Signs and Symptoms of Depression Present?     [] Yes      [x] No Signs and Symptoms of Depression Present? [] Yes      [] No  If yes, please explain:  ** Signs and Symptoms of Depression Present? [] Yes      [] No  If yes, please explain:  ** Signs and Symptoms of Depression Present? [] Yes      [] No  If yes, please explain:  **   Signs and Symptoms of Anxiety Present? [x] Yes      [] No  If yes, please explain:  Pt stated that she has panic attacks sometimes. Signs and Symptoms of Anxiety Present? [x] Yes      [] No  If yes, please explain:  Has a history of panic attacks Signs and Symptoms of Anxiety Present? [] Yes      [] No  If yes, please explain:  ** Signs and Symptoms of Anxiety Present? [] Yes      [] No  If yes, please explain:  ** Signs and Symptoms of Anxiety Present? [] Yes      [] No  If yes, please explain:  **   [] Patient has poor eye contact   [] Flat affect present. [] Signs of anxiety, anger or hostility    [] Signs social isolation present. []  Signs of alcohol or substance abuse       PSYCHOSOCIAL PLAN PSYCHOSOCIAL PLAN PSYCHOSOCIAL PLAN PSYCHOSOCIAL PLAN PSYCHOSOCIAL PLAN   *Interventions* *Interventions* *Interventions* *Interventions* *Interventions*   *Please check if needed  [] Psych Consult  [] Physician Referral  [] Stress Management Skills *Please check if needed  [] Psych Consult  [] Physician Referral  [x] Stress Management Skills *Please check if needed  [] Psych Consult  [] Physician Referral  [] Stress Management Skills *Please check if needed  [] Psych Consult  [] Physician Referral  [] Stress Management Skills *Please check if needed  [] Psych Consult  [] Physician Referral  [] Stress Management Skills   Is patient currently taking anti-depressant or anti-anxiety medications? [x] Yes      [] No  If yes, please list medications:  venlafaxine Change in anti-depressant or anti-anxiety medications? [] Yes      [x] No   Change in anti-depressant or anti-anxiety medications?   [] Yes      [] No  If yes, please list medications: Maintenance  [] Relapse [] Pre Contemplation  [] Contemplation  [] Preparation  [] Action  [] Maintenance  [] Relapse [] Pre Contemplation  [] Contemplation  [] Preparation  [] Action  [] Maintenance  [] Relapse   RISK FACTOR/EDUCATION ASSESSMENT RISK FACTOR/EDUCATION ASSESSMENT RISK FACTOR/EDUCATION ASSESSMENT RISK FACTOR/EDUCATION ASSESSMENT RISK FACTOR /EDUCATION ASSESSMENT   Hypertension  [x] Yes      [] No    Resting BP: 98/74  Peak Ex BP:110/68  Medication: metoprolol   Hypertension  Resting BP: 116/64  Peak Ex BP:156/72  Medication Changes:  [] Yes      [x] No Hypertension  Resting BP: **  Peak Ex BP:**  Medication Changes:  [] Yes      [] No Hypertension  Resting BP: **  Peak Ex BP:**  Medication Changes:  [] Yes      [] No Hypertension  Resting BP: **  Peak Ex BP:**  Medication Changes:  [] Yes      [] No   Lipids  HLD/DLD  [x] Yes      [] No  TOTAL CHOL: 119  HDL: 31  LDL:  71  TRI  Medication: atorvastatin Lipids  Medication Changes:  [] Yes      [x] No     Lipids  Medication Changes:  [] Yes      [] No     Lipids  Medication Changes:  [] Yes      [] No     Lipids    TOTAL CHOL: **  HDL:  **  LDL:  **  TRIG:  **  Medication Changes:  [] Yes      [] No   Diabetes  [] Yes      [x] No  FBS: 108           HbA1C: 5.6        Monitor BS @ home:   [] Yes      [x] No   Diabetes  NA   Diabetes  Most Recent BS:  BS have been in range  [] Yes      [] No  Medication Changes  [] Yes      [] No     Diabetes  Most Recent BS:  BS have been in range  [] Yes      [] No  Medication Changes  [] Yes      [] No     Diabetes  Most Recent BS:  BS have been in range  [] Yes      [] No  Medication Changes  [] Yes      [] No       Tobacco Use  [] Current  [] Former  [x] Never   Tobacco Use  Change in smoking status   [] Yes      [x] No       Tobacco Use  Change in smoking status   [] Yes      [] No    Quit date: **   Tobacco Use  Change in smoking status   [] Yes      [] No    Quit date: ** Tobacco Use  Change in smoking status   [] Yes      [] No    Quit date: **            Learning Barriers  Please select one:  [] Speech  [] Literacy  [] Hearing  [] Cognitive  [] Vision  [x] Ready to Learn Learning Barriers Addressed:   [x] Yes      [] No   Learning Barriers Addressed:   [] Yes      [] No   Learning Barriers Addressed:  [] Yes      [] No Learning Barriers Addressed:  [] Yes      [] No     RISK FACTOR/EDUCATION PLAN RISK FACTOR/EDUCATION PLAN RISK FACTOR/EDUCATION PLAN RISK FACTOR/EDUCATION PLAN RISK FACTOR/EDUCATION PLAN   *Interventions* *Interventions* *Interventions* *Interventions* *Interventions*   Recommended Educational Videos    [x] Overview of The Pritikin Eating Plan  [x] Becoming A Pritikin   [x] Diseases of Our Time-Part 1  [x] Calorie Density  [x] Label Reading-Part 1  [x] Move It! [x] Healthy Minds, Bodies, Hearts  [x] Dining Out-Part 1  [x] Heart Disease Risk Reduction  [x] Metabolic Syndrome & Belly Fat  [x] Facts on Fat  [x] Diseases of Our Times-Part 2  [x] Biology of Weight Control  [x] Biomechanical Limitations  [x] Nurtition Action Plan   Completed Videos/Date      7/2/2020  Overview of The Pritikin Eating Plan    Disease p1 - 7/13    Calorie density - 7/22    Label read 1 - 7/27    Heart disease risk red - 7/6 Completed Videos/Date Completed Videos/Date Recommended Educational Videos Completed    [] Yes      [] No    **If not completed, Why? **          Smoking Cessation/Relaspe Prevention Intervention needed? [] Yes      [x] No   Smoking Cessation/Relapse Prevention Scheduled? Not needed  Smoking Cessation/Relapse Prevention completed?    [] Yes      [] No  Date: **    Smoking Cessation Counseling attended  [] Yes      [] No  **If not completed, Why? **   Professional Referrals:  *Please check if needed  [] Diabetes Clinic  [] Lipid Clinic   [] Other:     Professional Referrals:  *Please check if needed  [] Diabetes Clinic  [] Lipid Clinic   [] Other:   Preventative Medication Preventative Medication Preventative Medication Preventative Medication Preventative Medication   Aspirin  [x] Yes    [] No  Blood Thinner: Clopidogrel/Effient/Brillinta  [x] Yes    [] No  Beta Blocker  [x] Yes    [] No  Ace Inhibitor  [] Yes    [x] No  Statin/Lipid Lowering  [x] Yes    [] No Medication Changes? [] Yes    [x] No Medication Changes? [] Yes    [] No Medication Changes? [] Yes    [] No Medication Changes? [] Yes    [] No   *Education* *Education* *Education* *Education* *Education*   Does Rufina require any additional education? [] Yes    [x] No   Does Ann Marquisber require any additional education? [] Yes    [x] No Does Annsuri Marquisber require any additional education? [] Yes    [] No Does Nan Cumber require any additional education? [] Yes    [] No Does Ann Cumber require any additional education?   [] Yes    [] No   Recommended Additional Educational Videos    Medical  [] Hypertension & Heart Disease  [] Decoding Lab Results  [] Aging Enhancing Your QoL  [] Sleep Disorders  Exercise  [] Body Composition  [] Improve Performance  [] Exercise Action Plan  [] Intro to Yoga  Behavioral  [] How Our Thoughts Can Heal Our Hearts  [] Smoking Cessation  Nutrition  [] Planning Your Eating Strategy  [] Lable Reading- Part 2  [] Dining Out - Part 2  [] Targeting Your Nutrition Priorities  [] Fueling a Healthy Body  [] Menu Workshop  [employer] [] Breakfast & Snacks  [] [employer] Salads & Dressing  [] [address] [place] [address]  [] Soups & Desserts   Additional Educational Videos Completed Additional Educational Videos Completed Additional Educational Videos Completed Additional Educational Videos Completed    [] Yes    [] No   *Goals* *Goals* *Goals* *Goals* *Goals*   Rufina's risk factor/education goals are as follows:    [x] Optimal BP <140/90  [] Blood Sugar <120  [x] Attend recommended video education sessions  [x] Takes medications as prescribed 100% of the time   [] ** Rufina's risk factor/education goals are as follows:    [x] Optimal BP <140/90  [] Blood Sugar <120  [x] Attend recommended video education sessions  [x] Takes medications as prescribed 100% of the time    Rufina's risk factor/education goals are as follows:    [x] Optimal BP <140/90  [] Blood Sugar <120  [x] Attend recommended video education sessions  [x] Takes medications as prescribed 100% of the time   [] ** Rufina's risk factor/education goals are as follows:    [x] Optimal BP <140/90  [] Blood Sugar <120  [x] Attend recommended video education sessions  [x] Takes medications as prescribed 100% of the time   [] ** Rufina achieved risk factor goals?   [] Yes    [] No  If no, why?  **     Monitored telemetry has revealed NSR  [] documented arrhythmia at increasing workloads  [] associated symptoms  Monitored telemetry has revealed NSR  [] documented arrhythmia at increasing workloads  [] associated symptoms ** Monitored telemetry has revealed  [] documented arrhythmia at increasing workloads  [] associated symptoms ** Monitored telemetry has revealed **  [] documented arrhythmia at increasing workloads  [] associated symptoms ** Monitored telemetry has revealed **  [] documented arrhythmia at increasing workloads  [] associated symptoms **   Physician Response    [x] Cardiac rehab is reasonably and medically necessary for continuous cardiac monitoring surveillance  of patient's cardiac activity  [x] Initiate continuous telemerty monitoring and notify me with any concerns  [] Other   Physician Response    [x] Cardiac rehab is reasonably and medically necessary for continuous cardiac monitoring surveillance  of patient's cardiac activity  [x] Continue continuous telemerty monitoring and notify me with any concerns  [] Other     Physician Response    [x] Cardiac rehab is reasonably and medically necessary for continuous cardiac monitoring surveillance  of patient's cardiac activity  [x] Continue continuous telemerty monitoring and notify me with any concerns   [] Other     Physician Response    [x] Cardiac rehab is reasonably and medically necessary for continuous cardiac monitoring surveillance  of patient's cardiac activity  [x] Continue continuous telemerty monitoring and notify me with any concerns   [] Other          Cosigned by:  Alesia Del Toro MD at 7/6/2020  8:36 AM    Revision History     Date/Time  User  Provider Type  Action    7/6/2020  8:36 AM  Alesia Del Toro MD  Physician  Cosign    7/2/2020  1:02 PM  Emily Ferreira  Exercise Physiologist  Sign

## 2020-07-29 NOTE — PROGRESS NOTES
Jana SÁNCHEZ.:  1945    Acct Number: [de-identified]   MRN:  [id number]                             [facility] NUTRITION WORKSHOP             Date: 2020        Session # _______    Jocelyn Hastings class covered:    ()  Label Reading  ()  Menus  (x)  Targeting Nutrition Priorities  ()  Fueling a Healthy Body    Readiness to change:    ( ) Pre-contemplative   ( ) Contemplative - ambivalent about change    ( ) Action - ready to set action plan and implement   ( x) Maintenance - has made change and is trying, and or practicing different alternative behaviors      Moi Terry was in the Workshop with the Dietitian for 45 minutes. The content was presented via Powerpoint, lecture, and patient participation based format. Motivational interviewing was utilized when needed, to promote change. Patient voiced understanding.     Electronically signed by Brianne Ojeda RD LD [address]  on 2020 at 12:02 PM

## 2020-07-31 ENCOUNTER — HOSPITAL ENCOUNTER (OUTPATIENT)
Dept: CARDIAC REHAB | Age: 75
Setting detail: THERAPIES SERIES
Discharge: HOME | End: 2020-07-31
Payer: MEDICARE

## 2020-07-31 PROCEDURE — G0422 INTENS CARDIAC REHAB W/EXERC: HCPCS

## 2020-07-31 PROCEDURE — G0423 INTENS CARDIAC REHAB NO EXER: HCPCS

## 2020-08-03 ENCOUNTER — HOSPITAL ENCOUNTER (OUTPATIENT)
Dept: CARDIAC REHAB | Age: 75
Setting detail: THERAPIES SERIES
Discharge: HOME | End: 2020-08-03
Payer: MEDICARE

## 2020-08-03 LAB
ABSOLUTE BASO #: 0 /CMM (ref 0–200)
ABSOLUTE EOS #: 100 /CMM (ref 0–500)
ABSOLUTE LYMPH #: 900 /CMM (ref 1000–4800)
ABSOLUTE MONO #: 500 /CMM (ref 0–800)
ABSOLUTE NEUT #: 3500 /CMM (ref 1800–7700)
BASOPHILS RELATIVE PERCENT: 1 % (ref 0–2)
EOSINOPHILS RELATIVE PERCENT: 2.4 % (ref 0–6)
HCT VFR BLD CALC: 40.9 % (ref 35–44)
HEMOGLOBIN: 13.4 GM/DL (ref 12–15)
IRON, SERUM: 69 MCG/DL (ref 28–170)
LYMPHOCYTES RELATIVE PERCENT: 18.6 % (ref 15–45)
MCH RBC QN AUTO: 29.4 PG (ref 27.5–33)
MCHC RBC AUTO-ENTMCNC: 32.7 GM/DL (ref 33–36)
MCV RBC AUTO: 90 CU MIC (ref 80–97)
MONOCYTES RELATIVE PERCENT: 9.6 % (ref 2–10)
NEUTROPHILS RELATIVE PERCENT: 68.4 % (ref 40–70)
NUCLEATED RBCS: 0.2 /100 WBC
PDW BLD-RTO: 16.1 % (ref 12–16)
PLATELET # BLD: 264 TH/CMM (ref 150–400)
RBC # BLD: 4.54 MIL/CMM (ref 4–5.1)
TRANSFERRIN: 267 MG/DL (ref 192–382)
WBC # BLD: 5.1 TH/CMM (ref 4.4–10.5)

## 2020-08-03 PROCEDURE — G0422 INTENS CARDIAC REHAB W/EXERC: HCPCS

## 2020-08-03 PROCEDURE — G0423 INTENS CARDIAC REHAB NO EXER: HCPCS

## 2020-08-03 NOTE — PROGRESS NOTES
Video Education Report - ICR/CR    Name:  Seb Gr     Date:  8/3/2020  MRN: [id number]     Session #:    Session Length: 40 min    Recommended Videos        []01 [employer] - Program Overview   34:22    []02 Overview of Pritikin Eating Plan   34:10    []03 Becoming a Pritikin    33:08     []04 Diseases of Our Time - Part 1   34:22    []05 Calorie Density     33:39   []06 Label Reading - Part 1    32:15   [x]07 Move it      32.54   []08 Healthy Minds, Bodies, Hearts   32:14   []09 Dining Out - Part 1    32:28   []10 Heart Disease Risk Reduction   77:47   []89 Metabolic Syndrome and Belly Fat  31:52   []12 Facts on Fat     35:29   []13 Diseases of Our Time - Part 2   33:07   []14 Biology of Weight Control   32:36   []15 Biomechanical Limitations   35:20   []16 Nutrition Action Plan    34:23    Comments:  Video completed,

## 2020-08-03 NOTE — PROGRESS NOTES
Hospital Facility-Based Program  Phase 2 Cardiac Rehab Weekly Progress Report      Patient prescribed exercise:  2.2:00 class. 3 times per week in rehab, 1-4 times per week at home for the amount of sessions/weeks specified by insurance. Current Levels: Treadmill: 2. 2mph/0% for 15 minutes, NuStep:  42 Eason for 15 minutes, UBE: 30 eason for 5 minutes. Progression Discussion: Maintain/Increase Aerobic exercise 15 minutes to work on endurance. Attempt to increase intensity by 5-20% for each modality this week. Try to increase intensities until Noble Brannon rates the exercises a 13-17 on Chelle RPE.

## 2020-08-04 LAB
ALBUMIN SERPL-MCNC: 4.2 GM/DL (ref 3.5–5)
ALP BLD-CCNC: 106 IU/L (ref 39–118)
ALT SERPL-CCNC: 32 IU/L (ref 10–40)
AST SERPL-CCNC: 26 IU/L (ref 15–41)
BILIRUB SERPL-MCNC: 0.5 MG/DL (ref 0.2–1)
BILIRUBIN DIRECT: 0.1 MG/DL (ref 0.1–0.2)
FERRITIN: 77 NG/ML (ref 11–307)
FOLATE: 18.2 NG/ML
IRON SATURATION: 18 % (ref 15–50)
IRON, SERUM: 68 MCG/DL (ref 28–170)
TOTAL PROTEIN: 6.7 G/DL (ref 6.2–8)
TRANSFERRIN: 267 MG/DL (ref 192–382)
VITAMIN B-12: 607 PG/ML (ref 180–914)

## 2020-08-05 ENCOUNTER — HOSPITAL ENCOUNTER (OUTPATIENT)
Dept: CARDIAC REHAB | Age: 75
Setting detail: THERAPIES SERIES
Discharge: HOME | End: 2020-08-05
Payer: MEDICARE

## 2020-08-05 LAB — SOLUBLE TRANSFERRIN RECEPT: 4.3 MG/L (ref 1.8–4.6)

## 2020-08-05 PROCEDURE — G0423 INTENS CARDIAC REHAB NO EXER: HCPCS

## 2020-08-05 PROCEDURE — G0422 INTENS CARDIAC REHAB W/EXERC: HCPCS

## 2020-08-05 NOTE — PROGRESS NOTES
Monroe SÁNCHEZ.:  1945    Acct Number: [de-identified]   MRN:  [id number]                             [facility] HEALTHY MIND-SET WORKSHOP             Date: 2020        Session #________    Todays class covered: ( x)  Stress and Health Workshop:  [facility] patient will learn about the body's adaptive response that is triggered by a variety of stressors. Patient will gain insight into the toll that chronic stress takes on their health, both emotionally and physically. ( ) Taking Charge of Stress Workshop:  [facility] patient will learn and practice a variety of stress management techniques. Patient will be able to effectively apply coping mechanisms in perceived stressful situations. ( ) New Thoughts New Behaviors Workshop: [facility] patient will learn and practice techniques for developing effective health and lifestyle goals. Patient will be able to effectively apply the goal setting process learned to develop at least one new personal goal.     ( ) Managing Moods & Relationships Workshop:  [facility] patient will learn how emotional and chronic stress factors can impact their hearts. They will learn healthy ways to handle stress and utilize positive coping mechanisms. In addition, [facility] patient will learn ways to improve communication skills. Samuel Chu actively participated and verbalized understanding. Total time in the Healthy Mind-Set class was 60 minutes.     Electronically signed by SERGIO Harp on 2020 at 4:46 PM

## 2020-08-07 ENCOUNTER — HOSPITAL ENCOUNTER (OUTPATIENT)
Dept: CARDIAC REHAB | Age: 75
Setting detail: THERAPIES SERIES
Discharge: HOME | End: 2020-08-07
Payer: MEDICARE

## 2020-08-07 PROCEDURE — G0423 INTENS CARDIAC REHAB NO EXER: HCPCS

## 2020-08-07 PROCEDURE — G0422 INTENS CARDIAC REHAB W/EXERC: HCPCS

## 2020-08-10 ENCOUNTER — HOSPITAL ENCOUNTER (OUTPATIENT)
Dept: CARDIAC REHAB | Age: 75
Setting detail: THERAPIES SERIES
Discharge: HOME | End: 2020-08-10
Payer: MEDICARE

## 2020-08-10 PROCEDURE — G0422 INTENS CARDIAC REHAB W/EXERC: HCPCS

## 2020-08-10 PROCEDURE — G0423 INTENS CARDIAC REHAB NO EXER: HCPCS

## 2020-08-10 NOTE — PROGRESS NOTES
Alvester Angelucci YOB: 1945    MRN:  [id number]    Date: 8/10/2020      Session Length:  35 min   Session # _______    EXERCISE WORKSHOP:  Balance Training and Fall Prevention                        Todays class addressed the importance of patient's sensorimotor skills (vision, proprioception, and the vestibular system) in maintaining their ability to balance at any age. Reviewed a variety of balancing and strength training exercises that are appropriate for patient's current level of function. Reviewed common causes of poor balance, possible solutions to these problems, and ways to modify the physical environment in order to minimize fall risks. Readiness to change:    ( ) Pre-contemplative   ( ) Contemplative - ambivalent about change    (x) Action - ready to set action plan and implement   ( ) Maintenance - has made change and is trying, and or practicing different alternative behaviors     Additional Notes:      Nikki Borrero was in the Workshop with the Exercise Physiologist for 35 minutes. The content was presented via Powerpoint, lecture, and patient participation based format. Motivational interviewing was utilized when needed, to promote change. Patient voiced understanding.     Electronically signed by Stanley Chopra on 8/10/2020 at 10:37 AM

## 2020-08-12 ENCOUNTER — HOSPITAL ENCOUNTER (OUTPATIENT)
Dept: CARDIAC REHAB | Age: 75
Setting detail: THERAPIES SERIES
Discharge: HOME | End: 2020-08-12
Payer: MEDICARE

## 2020-08-12 PROCEDURE — G0423 INTENS CARDIAC REHAB NO EXER: HCPCS

## 2020-08-12 PROCEDURE — G0422 INTENS CARDIAC REHAB W/EXERC: HCPCS

## 2020-08-12 NOTE — PROGRESS NOTES
Martinez SÁNCHEZ.:  1945    Acct Number: [de-identified]   MRN:  [id number]                             [facility] NUTRITION 1:1 Counseling             Date: 2020       Session # _______     Freddie Paredes class covered:    1:1 Counseling Session  Receptiveness to education/goals:  (x) Agreeable ( ) No Interest   ( ) Refused  Evaluation of education:  (x) Indicates understanding   ( ) Needs reinforcement     () Unsuccessful     Readiness to change:    ( ) Pre-contemplative   ( ) Contemplative - ambivalent about change    ( ) Action - ready to set action plan and implement   (x) Maintenance - has made change and is trying, and or practicing different alternative behaviors     GOALS:  1. Continue to read labels, plans to [employer] for [place] with no salt added canned goods. 2.  Exercise 5 days per week, going forward. Was doing 3d/wk    Levis Meigs reports making some changes since starting [facility] including doing some exervising at home. Reading labels now, cut out pasta, reducing sugar. Eats 1/2 or no bread on sandwich, intentionally havng 2 meatless dinners/wk. Reports she has intentionally cut out snacking at night. Mostly meatless lunch. Reports ~9 lbs of weight loss, intentionally. Food Recall:  Breakfast: small bowl multi grain cheerios, skim milk, 1/2 banana  Lunch:  Guys and low fat cottage cheese  Dinner:  Rotisserie chicken (remove skin, eat white meat), sweet salad with berries and pecans   Snacks:  2 cashews   Main Beverages:  Skim milk or water    Grocery Shopping: does herself  Meal Prep/Cooking: does herself  Dining Out: 1-2 meals/week     Levis Meigs was counseled by the Dietitian for 45 minutes. Motivational Interviewing was used to help promote change. Patient voiced understanding.      Electronically signed by Shagufta Chavarria RD LD [address]  on 2020 at 9:40 AM

## 2020-08-14 ENCOUNTER — HOSPITAL ENCOUNTER (OUTPATIENT)
Dept: CARDIAC REHAB | Age: 75
Setting detail: THERAPIES SERIES
Discharge: HOME | End: 2020-08-14
Payer: MEDICARE

## 2020-08-14 PROCEDURE — G0423 INTENS CARDIAC REHAB NO EXER: HCPCS

## 2020-08-14 PROCEDURE — G0422 INTENS CARDIAC REHAB W/EXERC: HCPCS

## 2020-08-14 NOTE — PROGRESS NOTES
Eneida SÁNCHEZ.:  1945  Acct Number: [de-identified]  MRN:  [id number]                  [facility] COOKING SCHOOL WORKSHOP             Date: 2020        Session # ________   Ryann Herrera class covered:      () Adding Flavor     () Fast Breakfasts     () Salads and Dressings     (x) Soups and Sauces     () Simple Sides     () Appetizers and Snacks     () Delicious Desserts     () Plant Based Proteins     () Fast Evening Meals     () Weekend Breakfasts     () Efficiency Cooking     () Meat Alternatives     Patients were shown how to choose, prep, and cook; substitutions and other options were given. Samples were offered. Recipes were given and questions answered. The patient above was in the [employer] for 45 minutes.       Electronically signed by Yasmani DANIELS [address] on 2020 at 11:44 AM

## 2020-08-17 ENCOUNTER — HOSPITAL ENCOUNTER (OUTPATIENT)
Dept: CARDIAC REHAB | Age: 75
Setting detail: THERAPIES SERIES
Discharge: HOME | End: 2020-08-17
Payer: MEDICARE

## 2020-08-17 PROCEDURE — G0423 INTENS CARDIAC REHAB NO EXER: HCPCS

## 2020-08-17 PROCEDURE — G0422 INTENS CARDIAC REHAB W/EXERC: HCPCS

## 2020-08-17 NOTE — PROGRESS NOTES
Hospital Facility-Based Program  Phase 2 Cardiac Rehab Weekly Progress Report      Patient prescribed exercise:  9:00 class. 3 times per week in rehab, 1-4 times per week at home for the amount of sessions/weeks specified by insurance. Current Levels: Treadmill: 2. 6mph/1% for 15 minutes, [employer] Aird[employer]: Level   for   minutes, NuStep:  55 Navarro for 15 minutes, UBE: Level 26 for 5 minutes. Progression Discussion: Maintain/Increase Aerobic exercise to work on endurance. Attempt to increase intensity by 5-20% for each modality this week. Try to increase intensities until Jessi Ansari rates the exercises a 13-17 on Chelle RPE.

## 2020-08-19 ENCOUNTER — HOSPITAL ENCOUNTER (OUTPATIENT)
Dept: CARDIAC REHAB | Age: 75
Setting detail: THERAPIES SERIES
Discharge: HOME | End: 2020-08-19
Payer: MEDICARE

## 2020-08-19 PROCEDURE — G0422 INTENS CARDIAC REHAB W/EXERC: HCPCS

## 2020-08-19 PROCEDURE — G0423 INTENS CARDIAC REHAB NO EXER: HCPCS

## 2020-08-19 NOTE — PROGRESS NOTES
Video Education Report - ICR/CR    Name:  Domenica Osullivan     Date:  8/19/2020  MRN: [id number]     Session #:  21  Session Length: 40 min    Recommended Videos        []01 [employer] - Program Overview   34:22    []02 Overview of Pritikin Eating Plan   34:10    []03 Becoming a Pritikin    33:08     []04 Diseases of Our Time - Part 1   34:22    []05 Calorie Density     33:39   []06 Label Reading - Part 1    32:15   []07 Move it      32.54   [x]08 Healthy Minds, Bodies, Hearts   32:14   []09 Dining Out - Part 1    32:28   []10 Heart Disease Risk Reduction   48:43   []85 Metabolic Syndrome and Belly Fat  31:52   []12 Facts on Fat     35:29   []13 Diseases of Our Time - Part 2   33:07   []14 Biology of Weight Control   32:36   []15 Biomechanical Limitations   35:20   []16 Nutrition Action Plan    34:23        Comments:  Video completed.

## 2020-08-21 ENCOUNTER — HOSPITAL ENCOUNTER (OUTPATIENT)
Dept: CARDIAC REHAB | Age: 75
Setting detail: THERAPIES SERIES
Discharge: HOME | End: 2020-08-21
Payer: MEDICARE

## 2020-08-21 PROCEDURE — G0422 INTENS CARDIAC REHAB W/EXERC: HCPCS

## 2020-08-21 PROCEDURE — G0423 INTENS CARDIAC REHAB NO EXER: HCPCS

## 2020-08-21 NOTE — PROGRESS NOTES
Yuliet SÁNCHEZ.:  1945  Acct Number: [de-identified]  MRN:  [id number]                  [facility] COOKING SCHOOL WORKSHOP             Date: 2020        Session # ________   [employer] class covered:      () Adding Flavor     () Fast Breakfasts     () Salads and Dressings     () Soups and Sauces     (x) Simple Sides     () Appetizers and Snacks     () Delicious Desserts     () Plant Based Proteins     () Fast Evening Meals     () Weekend Breakfasts     () Efficiency Cooking     () Meat Alternatives     Patients were shown how to choose, prep, and cook; substitutions and other options were given. Samples were offered. Recipes were given and questions answered. The patient above was in the [employer] for 60 minutes.       Electronically signed by Mundo Guan on 2020 at 11:16 AM

## 2020-08-24 ENCOUNTER — HOSPITAL ENCOUNTER (OUTPATIENT)
Dept: CARDIAC REHAB | Age: 75
Setting detail: THERAPIES SERIES
Discharge: HOME | End: 2020-08-24
Payer: MEDICARE

## 2020-08-24 PROCEDURE — G0423 INTENS CARDIAC REHAB NO EXER: HCPCS

## 2020-08-24 PROCEDURE — G0422 INTENS CARDIAC REHAB W/EXERC: HCPCS

## 2020-08-24 NOTE — PROGRESS NOTES
Video Education Report - ICR/CR    Name:  Domenica Osullivan     Date:  8/24/2020  MRN: [id number]     Session #:  21  Session Length: 45 min    Recommended Videos        []01 [employer] - Program Overview   34:22    []02 Overview of Pritikin Eating Plan   34:10    []03 Becoming a Pritikin    33:08    []04 Diseases of Our Time - Part 1   34:22    []05 Calorie Density     33:39   []06 Label Reading - Part 1    32:15   []07 Move it      32.54   []08 Healthy Minds, Bodies, Hearts   32:14   [x]09 Dining Out - Part 1    32:28   []10 Heart Disease Risk Reduction   73:02   []94 Metabolic Syndrome and Belly Fat  31:52   []12 Facts on Fat     35:29   []13 Diseases of Our Time - Part 2   33:07   []14 Biology of Weight Control   32:36   []15 Biomechanical Limitations   35:20   []16 Nutrition Action Plan    34:23    Comments:  Video completed, group discussion

## 2020-08-24 NOTE — PLAN OF CARE
[address] [place] [address] Facility-Based Program  Individualized Cardiac Treatment Plan    Patient Name:  Avtar Nicholson  :  1945  Age:  69 y.o. MRN:  [id number]  Diagnosis: PCI & STEMI  Date of Event: 2020   Physician:  Tye Wooten  Next Office Visit: 2020   Date Entered Program: 2020  Risk Stratifications: [] Low [x] Intermediate [] High  Allergies: No Known Allergies    COVID -19 Screen  Do you have any of the following symptoms:  [] Fever [] Cough [] SOB [] Muscle/Body Ache [] Loss of taste/smell [x] None    Have you traveled outside of the [place]? [] Yes      [x] No    Have you been around anyone who has tested Positive for COVID 19?  [] Yes      [x] No    The following Education has been completed with patient. [x] Wait in the lobby until we call you back to rehab. [x] You must wear a mask while in the medical center, and in cardiac rehab. Please bring your own. [x] Bring your own bottle of water with you. [x] You can not bring anyone with you into the medical center at this time. They are welcome to sit in their car and wait for you.     Individual Cardiac Treatment Plan -EXERCISE  INITIAL 30 DAY 60 DAY 90 DAY FINAL DAY   EXERCISE  ASSESSMENT/PLAN EXERCISE  REASSESSMENT EXERCISE   REASSESSMENT EXERCISE   REASSESSMENT EXERCISE  DISCHARGE/FOLLOW-UP   Date: 2020 Date: 2020 Date: 20 Date: Date:   Session #1 Session # 25 Session # 40 Session # ** Session # **  Last session completed on **   Stages of Change Stages of Change Stages of Change Stages of Change Stages of Change   [] Pre Contemplation  [] Contemplation  [x] Preparation  [] Action  [] Maintenance  [] Relapse [] Pre Contemplation  [] Contemplation  [x] Preparation  [] Action  [] Maintenance  [] Relapse [] Pre Contemplation  [] Contemplation  [x] Preparation  [] Action  [] Maintenance  [] Relapse [] Pre Contemplation  [] Contemplation  [] Preparation  [] Action  [] Maintenance  [] Relapse [] Pre Contemplation  [] Contemplation  [] Preparation  [] Action  [] Maintenance  [] Relapse   EXERCISE ASSESSMENT EXERCISE ASSESSMENT EXERCISE ASSESSMENT EXERCISE ASSESSMENT EXERCISE ASSESSMENT   6 Min Walk Test  Distance walked:   0.13 miles  686 ft.  1.99 METs  Max HR:109 BPM      RPE:  12  THR:  112-126  Rhythm:  NSR    6 Min Walk Test  Distance walked:   ** miles  ** ft  ** METs  Max HR:** BPM      RPE:  **  %Change ft= **    Rhythm:  **   DASI: 4.6 METs DASI: 5.1 METs DASI: 5.6 METs DASI: ** METs DASI: ** METs   Return to Work  [employer] on returning to work? 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 [] Yes    [x] No     Return to work:  Has Preston Wise returned to work? [] Yes    [] No    Return to work date set? [] Yes, **    [] No    Preston Wise is doing ** at work. Return to work:  Has Preston Wise returned to work? [] Yes    [] No    Return to work? [] Yes, **    [] No    *Required MET Level achieved for job duties? [] Yes    [] No   Orthopedic Limitations/  [x] Yes    [] No  If yes please list:  Both hips replaced, and foot problems      Orthopedic Limitations  *If patient has orthopedic issue:   Actions/  accomodations needed to make Rufina successful : NA, Bethany Rosas is doing well  Orthopedic Limitations  No AD Orthopedic Limitations   Orthopedic Limitations     Fall Risk  Fall risk assessed? [x] Yes      [] No    Balance Issues?   [] Yes      [x] No     [] Walker [] Cane    [x] Safety issues reviewed      Fall Risk  *If patient is a fall risk, action needed to accommodate: NA Fall Risk  na Fall Risk Fall Risk   Home Exercise  [] Yes    [x] No   Home Exercise  [] Yes    [x] No   Home Exercise  [] Yes    [x] No  No aerobic Home Exercise  [] Yes    [] No  Type: **  Frequency: **  Duration: ** Home Exercise  [] Yes    [] No  Type: **  Frequency: **  Duration: **   Angina with continue exercise at  5-7 days/week   Duration:   Total aerobic exercise = 30-45 min    5-8 min/mode Duration:  Total aerobic exercises = 35 min     15min/mode Duration:  Total aerobic exercises = 35 min     15min/mode Duration:  Total aerobic exercises = ** min     **min/mode Duration:  Total erobic exercise =  60-90 min   Intensity:   MET Level = 1.99  RPE = 12-15 Intensity:  Max MET Level = 2.7  RPE = 12-15 Intensity:  Max MET Level = 3.6  RPE = 12-15 Intensity:  Max MET Level = **  RPE = 12-15 Intensity:  Max MET Level = ** RPE = 12-15   Progression: increase aerobic activity up to 15 min over next 4 weeks by increasing time 2-3 min/week. Progression: increase aerobic intensity as tolerated by the pt   Progression: Increase intensity 5-10% over the next 4 weeks as tolerated. Progression: Progression:  Increase time/intensity when RPE <13, and HR is in [facility]   [x] Yes      [] No  Upper and Lower body strength training 2x/wk    Wt: 2#       Reps:  8-15    *Increase wt. after completing 15 reps with an RPE of <12/13. [x] Yes      [] No  Upper and Lower body strength training 2x/wk    Wt: 3#       Reps:  8-15    *Increase wt. after completing 15 reps with an RPE of <12/13. [x] Yes      [] No  Upper and Lower body strength training 2x/wk    Wt: 4#       Reps:  8-15    *Increase wt. after completing 15 reps with an RPE of <12/13. [] Yes      [] No  Upper and Lower body strength training 2x/wk    Wt: **#       Reps:  8-15    *Increase wt. after completing 15 reps with an RPE of <12/13. Continue Strength Training at home   [] Exercise Log & Strength training handout given     Wt: **#       Reps:  8-15    *Increase wt. after completing 15 reps with an RPE of <12/13.    Flexibility Flexibility Flexibility Flexibility Flexibility   [x] Yes      [] No  25 min session of Core Strength & Flexibility 1x/per week  Attends Core Strength & Flexibility   [x] Yes      [] No Attends Core Strength & Flexibility   [x] Yes      [] No Attends Core Strength & Flexibility   [] Yes      [] No Continue Core Strength & Flexibility at home   Home Exercise  *Anamika Florentin planning to start walking 2 days/week for 10 min on days not in rehab. Home Exercise  *Ginette Ewing verbalizes planning to start walking 2 days/week for 15 min on days not in rehab. Home Exercise  *Ginette Nguyễncandace verbalizes planning to walk/ bike 3-4 days/week for 30 min on days not in rehab. Home Exercise  *Rufina verbalizes planning to ** ** days/week for ** min on days not in rehab. Home Exercise  *Rufina verbalizes his/her plan to ** ** days/week for ** min @ **   *Education* *Education* *Education* *Education* *Education*   RPE Scale  [x] Yes      [] No  Exercise Safety  [x] Yes      [] No  Equipment Orientation  [x] Yes      [] No  S/S to Report  [x] Yes      [] No  Warm Up/Cool Down  [x] Yes      [] No  Home Exercise  [x] Yes      [] No    All Exercise Education Completed  [] Yes      [] No   Exercise Education Recommended    Workshops  [x] Improving Performance  [x] Balance Training & Fall Prevention  [x] Exercise Biomechanics  [x] Resistance Training & Core Strength Exercise Education Attended/Date    improv performance - 7/20 Exercise Education Attended/Date    8/10/20Balance Training & Fal Exercise Education Attended/Date All Sessions Completed?     [] Yes  [] No   *Goals* *Goals* *Goals* *Goals* *Goals*   Initial Exercise Goals Exercise Goals  Exercise Goals   Exercise Goals  Exercise Goals   Rufina plans to:  [x] Attend exercise sessions 3x/wk  [x] initiate home exercise 2-3x/wk for 10-20 min  [x] Increase 6 min walk distance by 10%  [x] Attend Exercise workshops Rufina plans to:  [x] Attend exercise sessions 3x/wk  [x] continue home exercise 2-3x/wk for 20-30 min  [x] Attend Exercise workshops Rufina's plans to:  [x] Attend exercise sessions 3x/wk  [x] continue home exercise 3-4x/wk for 30-45 min  [x] Determine plan of exercise following rehab  [x] Attend Exercise workshops Rufina's plans to:  Pamela Moser achieved exercise goals? []  Yes    [] No  If no, why?  **  [] Increased 6 min walk distance by 10%  [] Currently exercising 30-60 min/day, 5-7days/wk   [] Plans to continue exercise on own  [] Plans to join a local fitness center to continue exercise  [] Does not plan to continue to exercise after rehab   Return to ADL or Hobbies:  6 Jessee Maloney would like to improve strength and endurance so she does not become so short of breath as easily Return to ADL or Hobbies:  6 Jessee Maloney would like to improve strength and endurance so she is able to return to walk longer distances without being so short of breath  Return to ADL or Hobbies:  6 Jessee Maloney would like to improve strength and endurance so she is able to return to all previous activities Return to ADL or Hobbies:  6 Jessee Maloney would like to improve strength and endurance so he/she is able to return to ** Return to ADL or Hobbies:  6 Jessee Maloney would like to improve strength and endurance so he/she is able to return to **    *MET level required for above goal:  4.0 METs MET level Achieved:  4. 0METs MET level Achieved:  3.6 METs MET level Achieved:  **METs MET level Achieved:  **METs     Individual Cardiac Treatment Plan - Nutrition  NUTRITION  ASSESSMENT/PLAN NUTRITION  REASSESSMENT NUTRITION   REASSESSMENT NUTRITION   REASSESSMENT NUTRITION  DISCHARGE/FOLLOW-UP   Stages of Change Stages of Change Stages of Change Stages of Change Stages of Change   [] Pre Contemplation  [x] Contemplation  [] Preparation  [] Action  [] Maintenance  [] Relapse [] Pre Contemplation  [] Contemplation  [x] Preparation  [] Action  [] Maintenance  [] Relapse [] Pre Contemplation  [] Contemplation  [] Preparation  [x] Action  [] Maintenance  [] Relapse [] Pre Contemplation  [] Contemplation  [] Preparation  [] Action  [] Maintenance  [] Relapse [] Pre Contemplation  [] Contemplation  [] Preparation  [] Action  [] Maintenance  [] Relapse   NUTRITION ASSESSMENT NUTRITION ASSESSMENT NUTRITION ASSESSMENT NUTRITION ASSESSMENT NUTRITION ASSESSMENT   Weight Management  Weight: 164.2        Height: 5'0\"   BMI: 32 Weight Management  Weight: 161.8                  Weight Management  Weight: 155.8                 Weight Management  Weight: ** Weight Management  Weight: **                    BMI: **   Eating Plan  Current eating habits: small portion size and low fat foods Eating Plan  Changes: less salt no red meat less sugar and more fruits and veggies Eating Plan  Changes: more veggies &fruit, less meat, change in spices Eating Plan  Changes: Eating Plan Improvements:   Alcohol Use  [] none          [] daily  [] weekly      [x] special          Diet Assessment Tool:  RATE YOUR PLATE  *Given to patient to complete and return. Diet Assessment Tool:    Score: did not return /69       Diet Assessment Tool: RATE YOUR PLATE  Score: **/06   NUTRITION PLAN NUTRITION PLAN NUTRITION PLAN NUTRITION PLAN NUTRITION PLAN   *Interventions* *Interventions* *Interventions* *Interventions* *Interventions*   Initial Survey given Goal Setting Discussion:   [x] Yes      [] No       Follow Up Survey Reviewed & Goals Updated:     Professional Referral  Please check if needed. [] Dietitian Consult   [] Wt. Management Referral  [] Other:  Professional Referral  Please check if needed. [] Dietitian Consult   [] Wt. Management Referral  [] Other: Professional Referral  Please check if needed. [] Dietitian Consult   [] Wt. Management Referral  [] Other: Professional Referral  Please check if needed. [] Dietitian Consult   [] Wt. Management Referral  [] Other: Professional Referral  Please check if needed. [] Dietitian Consult   [] Wt.  Management Referral  [] Other:   *Education* *Education* *Education* *Education* *Education*   Nutritional Education Recommended    [x] 1:1 Registered Dietitian    Workshops  [x] Label Reading   [x] Menu  [x] Targeting Nutrition Priorities  [x] Fueling a Healthy Body   Nutritional Education Attended/Date    Fueling a healthy body - 7/8    Targeting nutrition prior - 7/29   Nutritional Education Attended/Date    8/17/20 Label Reading Nutritional Education Attended/Date All Sessions Completed? [] Yes  [] No   Cooking School  Recommended     [x] Adding Flavor  [x] Fast & Healthy     Breakfasts  [x] Salads & Dressings  [x] Soups & Simple     Sauces  [x] Simple Sides  [x] Appetizers &     Snacks  [x] Delicious Desserts  [x] Plant Proteins  [x] Fast Evening Meals  [x] Weekend Breakfasts  [x] Cook once, Eat       twice  [x] [employer]  Sessions Completed    Adding flavor - 7/24    Cook once eat twice - 7/10    Meat al - 7/17   Cooking School  Sessions Completed    7/31/20   Fast & Healthy     Breakfasts    8/7/20   Salads & Dressings    8/14/20   Soups & Simple     Sauces    8/21/20 [employer]ing School  Sessions Completed     Cooking School    # of sessions completed:  **   *Goals* *Goals* *Goals* *Goals* *Goals*   Adans nutritional goals are as follows:  Complete and return diet survey Adans nutritional goals are as follows:  [x] Attend Nutrition Workshops  [x] Attend 1:1   [x] Attend Cooking Classes   Adans nutritional goals are as follows:  [x] Attend Nutrition Workshops  [x] Attend 1:1   [x] Attend Cooking Classes  [x] Complete and return diet survey  [] ** Adans nutritional goals are as follows:  [] Attend Nutrition Workshops  [] Attend 1:1   [] Attend Cooking Classes  [] ** Rufina achieved nutritional goals   [] Yes    [] No  If no, why?   Use knowledge gained to continue Pritikin eating plan at home       Individual Cardiac Treatment Plan - Psychosocial  PSYCHOSOCIAL  ASSESSMENT/PLAN PSYCHOSOCIAL  REASSESSMENT PSYCHOSOCIAL   REASSESSMENT PSYCHOSOCIAL   REASSESSMENT PSYCHOSOCIAL  DISCHARGE/FOLLOW-UP   Stages of Change Stages of Change Stages of Change Stages of Change Stages of Change   [] Pre Contemplation  [] Contemplation  [x] Preparation  [] Action  [] Maintenance  [] Relapse [] Pre Contemplation  [] Contemplation  [x] Preparation  [] Action  [] Maintenance  [] Relapse [] Pre Contemplation  [] Contemplation  [] Preparation  [] Action  [x] Maintenance  [] Relapse [] Pre Contemplation  [] Contemplation  [] Preparation  [] Action  [] Maintenance  [] Relapse [] Pre Contemplation  [] Contemplation  [] Preparation  [] Action  [] Maintenance  [] Relapse   PSYCHOSOCIAL ASSESSMENT PSYCHOSOCIAL ASSESSMENT PSYCHOSOCIAL ASSESSMENT PSYCHOSOCIAL ASSESSMENT PSYCHOSOCIAL ASSESSMENT   Behavioral Outcomes Behavioral Outcomes Behavioral Outcomes Behavioral Outcomes Behavioral Outcomes   Tool Used:  Magdalena [employer] Juan Pablo, Quality of Life Index, Cardiac Version IV  *Given to patient to complete. Tool Used:    Magdalena [employer] Juan Pablo, Quality of Life Index, Cardiac Version IV     QOL Index Score: 28.64  HF:28.62  S&E:30  P&S: 28.07  Family: 27.6   Tool Used:     Magdalena [employer] Juan Pablo, Quality of Life Index, Cardiac Version IV    QOL Index Score: **  HF:**  S&E:**  P&S: **  Family: **   PHQ-9 score 2  Depression Severity  [x]Minimal  []Mild   []Moderate  []Moderately Severe  []Severe    PHQ-9 score **  Depression Severity  []Minimal  []Mild   []Moderate  []Moderately Severe []Severe   Does patient have Family Support? [x] Yes      [] No  No signs of marital/family distress       Within the Past Month:  *Have you wished you were dead or wished you could go to sleep and not wake up? [] Yes      [x] No  *Have you had any thoughts of killing yourself?   [] Yes      [x] No         Using a scale of 0-10, 0=none, 10=very:   Rate your depression: 2  Rate your anxiety:  5  Using a scale of 0-10, 0=none, 10=very:   Rate your depression: 0  Rate your anxiety:  1 Using a scale of 0-10, 0=none, 10=very:   Rate your depression: 0  Rate your anxiety:  1 Using a scale of 0-10, 0=none, 10=very:   Rate your depression: **  Rate your anxiety: ** Using a scale of 0-10, 0=none, 10=very:   Rate your depression: **  Rate your anxiety:  **   Signs and Symptoms of Depression Present? [] Yes      [x] No   Signs and Symptoms of Depression Present? [] Yes      [x] No Signs and Symptoms of Depression Present? [] Yes      [x] No   Signs and Symptoms of Depression Present? [] Yes      [] No  If yes, please explain:  ** Signs and Symptoms of Depression Present? [] Yes      [] No  If yes, please explain:  **   Signs and Symptoms of Anxiety Present? [x] Yes      [] No  If yes, please explain:  Pt stated that she has panic attacks sometimes. Signs and Symptoms of Anxiety Present? [x] Yes      [] No  If yes, please explain:  Has a history of panic attacks Signs and Symptoms of Anxiety Present? [] Yes      [x] No   Signs and Symptoms of Anxiety Present? [] Yes      [] No  If yes, please explain:  ** Signs and Symptoms of Anxiety Present? [] Yes      [] No  If yes, please explain:  **   [] Patient has poor eye contact   [] Flat affect present. [] Signs of anxiety, anger or hostility    [] Signs social isolation present. []  Signs of alcohol or substance abuse       PSYCHOSOCIAL PLAN PSYCHOSOCIAL PLAN PSYCHOSOCIAL PLAN PSYCHOSOCIAL PLAN PSYCHOSOCIAL PLAN   *Interventions* *Interventions* *Interventions* *Interventions* *Interventions*   *Please check if needed  [] Psych Consult  [] Physician Referral  [] Stress Management Skills *Please check if needed  [] Psych Consult  [] Physician Referral  [x] Stress Management Skills *Please check if needed  [] Psych Consult  [] Physician Referral  [x] Stress Management Skills *Please check if needed  [] Psych Consult  [] Physician Referral  [] Stress Management Skills *Please check if needed  [] Psych Consult  [] Physician Referral  [] Stress Management Skills   Is patient currently taking anti-depressant or anti-anxiety medications?   [x] Yes      [] No  If yes, please list medications:  venlafaxine Maintenance  [] Relapse [] Pre Contemplation  [] Contemplation  [x] Preparation  [] Action  [] Maintenance  [] Relapse [] Pre Contemplation  [] Contemplation  [] Preparation  [x] Action  [] Maintenance  [] Relapse [] Pre Contemplation  [] Contemplation  [] Preparation  [] Action  [] Maintenance  [] Relapse [] Pre Contemplation  [] Contemplation  [] Preparation  [] Action  [] Maintenance  [] Relapse   RISK FACTOR/EDUCATION ASSESSMENT RISK FACTOR/EDUCATION ASSESSMENT RISK FACTOR/EDUCATION ASSESSMENT RISK FACTOR/EDUCATION ASSESSMENT RISK FACTOR /EDUCATION ASSESSMENT   Hypertension  [x] Yes      [] No    Resting BP: 98/74  Peak Ex BP:110/68  Medication: metoprolol   Hypertension  Resting BP: 116/64  Peak Ex BP:156/72  Medication Changes:  [] Yes      [x] No Hypertension  Resting BP: 118/66  Peak Ex BP:128/78  Medication Changes:  [] Yes      [x] No Hypertension  Resting BP: **  Peak Ex BP:**  Medication Changes:  [] Yes      [] No Hypertension  Resting BP: **  Peak Ex BP:**  Medication Changes:  [] Yes      [] No   Lipids  HLD/DLD  [x] Yes      [] No  TOTAL CHOL: 119  HDL: 31  LDL:  71  TRI  Medication: atorvastatin Lipids  Medication Changes:  [] Yes      [x] No     Lipids  Medication Changes:  [] Yes      [x] No     Lipids  Medication Changes:  [] Yes      [] No     Lipids    TOTAL CHOL: **  HDL:  **  LDL:  **  TRIG:  **  Medication Changes:  [] Yes      [] No   Diabetes  [] Yes      [x] No  FBS: 108           HbA1C: 5.6        Monitor BS @ home:   [] Yes      [x] No   Diabetes  NA   Diabetes  na     Diabetes  Most Recent BS:  BS have been in range  [] Yes      [] No  Medication Changes  [] Yes      [] No     Diabetes  Most Recent BS:  BS have been in range  [] Yes      [] No  Medication Changes  [] Yes      [] No       Tobacco Use  [] Current  [] Former  [x] Never   Tobacco Use  Change in smoking status   [] Yes      [x] No       Tobacco Use  Change in smoking status   [] Yes      [x] No       Tobacco Use  Change in smoking status   [] Yes      [] No    Quit date: ** Tobacco Use  Change in smoking status   [] Yes      [] No    Quit date: **            Learning Barriers  Please select one:  [] Speech  [] Literacy  [] Hearing  [] Cognitive  [] Vision  [x] Ready to Learn Learning Barriers Addressed:   [x] Yes      [] No   Learning Barriers Addressed:   [x] Yes      [] No   Learning Barriers Addressed:  [] Yes      [] No Learning Barriers Addressed:  [] Yes      [] No     RISK FACTOR/EDUCATION PLAN RISK FACTOR/EDUCATION PLAN RISK FACTOR/EDUCATION PLAN RISK FACTOR/EDUCATION PLAN RISK FACTOR/EDUCATION PLAN   *Interventions* *Interventions* *Interventions* *Interventions* *Interventions*   Recommended Educational Videos    [x] Overview of The Pritikin Eating Plan  [x] Becoming A Pritikin   [x] Diseases of Our Time-Part 1  [x] Calorie Density  [x] Label Reading-Part 1  [x] Move It! [x] Healthy Minds, Bodies, Hearts  [x] Dining Out-Part 1  [x] Heart Disease Risk Reduction  [x] Metabolic Syndrome & Belly Fat  [x] Facts on Fat  [x] Diseases of Our Times-Part 2  [x] Biology of Weight Control  [x] Biomechanical Limitations  [x] Nurtition Action Plan   Completed Videos/Date      7/2/2020  Overview of The Pritikin Eating Plan    Disease p1 - 7/13    Calorie density - 7/22    Label read 1 - 7/27    Heart disease risk red - 7/6 Completed Videos/Date     8/3/20  Move It!    8/19/20   Healthy Minds, Bodies, Hearts Completed Videos/Date Recommended Educational Videos Completed    [] Yes      [] No    **If not completed, Why? **          Smoking Cessation/Relaspe Prevention Intervention needed? [] Yes      [x] No   Smoking Cessation/Relapse Prevention Scheduled?    Not needed  na    Smoking Cessation Counseling attended  [] Yes      [] No  **If not completed, Why? **   Professional Referrals:  *Please check if needed  [] Diabetes Clinic  [] Lipid Clinic   [] Other:     Professional Referrals:  *Please check if needed  [] Diabetes Clinic  [] Lipid Clinic   [] Other:   Preventative Medication Preventative Medication Preventative Medication Preventative Medication Preventative Medication   Aspirin  [x] Yes    [] No  Blood Thinner: Clopidogrel/Effient/Brillinta  [x] Yes    [] No  Beta Blocker  [x] Yes    [] No  Ace Inhibitor  [] Yes    [x] No  Statin/Lipid Lowering  [x] Yes    [] No Medication Changes? [] Yes    [x] No Medication Changes? [] Yes    [x] No Medication Changes? [] Yes    [] No Medication Changes? [] Yes    [] No   *Education* *Education* *Education* *Education* *Education*   Does Rufina require any additional education? [] Yes    [x] No   Does Iesha Simon require any additional education? [] Yes    [x] No Does Iesha Simon require any additional education? [] Yes    [x] No Does Iesha Simon require any additional education? [] Yes    [] No Does Iesha Simon require any additional education?   [] Yes    [] No   Recommended Additional Educational Videos    Medical  [] Hypertension & Heart Disease  [] Decoding Lab Results  [] Aging Enhancing Your QoL  [] Sleep Disorders  Exercise  [] Body Composition  [] Improve Performance  [] Exercise Action Plan  [] Intro to Yoga  Behavioral  [] How Our Thoughts Can Heal Our Hearts  [] Smoking Cessation  Nutrition  [] Planning Your Eating Strategy  [] Lable Reading- Part 2  [] Dining Out - Part 2  [] Targeting Your Nutrition Priorities  [] Fueling a Healthy Body  [] Menu Workshop  [employer] [] Breakfast & Snacks  [] [employer] Salads & Dressing  [] [address] [place] [address]  [] Soups & Desserts   Additional Educational Videos Completed Additional Educational Videos Completed Additional Educational Videos Completed Additional Educational Videos Completed    [] Yes    [] No   *Goals* *Goals* *Goals* *Goals* *Goals*   Adans risk factor/education goals are as follows:    [x] Optimal BP <140/90  [] Blood Sugar <120  [x] Attend recommended video education sessions  [x] Takes medications as prescribed 100% of the

## 2020-08-26 ENCOUNTER — HOSPITAL ENCOUNTER (OUTPATIENT)
Dept: CARDIAC REHAB | Age: 75
Setting detail: THERAPIES SERIES
Discharge: HOME | End: 2020-08-26
Payer: MEDICARE

## 2020-08-26 PROCEDURE — G0423 INTENS CARDIAC REHAB NO EXER: HCPCS

## 2020-08-26 PROCEDURE — G0422 INTENS CARDIAC REHAB W/EXERC: HCPCS

## 2020-08-28 ENCOUNTER — HOSPITAL ENCOUNTER (OUTPATIENT)
Dept: CARDIAC REHAB | Age: 75
Setting detail: THERAPIES SERIES
Discharge: HOME | End: 2020-08-28
Payer: MEDICARE

## 2020-08-28 PROCEDURE — G0422 INTENS CARDIAC REHAB W/EXERC: HCPCS

## 2020-08-28 PROCEDURE — G0423 INTENS CARDIAC REHAB NO EXER: HCPCS

## 2020-08-28 NOTE — PROGRESS NOTES
Laron SÁNCHEZ.:  1945  Acct Number: [de-identified]  MRN:  [id number]                  [facility] COOKING SCHOOL WORKSHOP             Date: 2020        Session # ________   Christiano Chacon class covered:      () Adding Flavor     () Fast Breakfasts     () Salads and Dressings     () Soups and Sauces     () Simple Sides     (X) Appetizers and Snacks     () Delicious Desserts     () Plant Based Proteins     () Fast Evening Meals     () Weekend Breakfasts     () Efficiency Cooking     () Meat Alternatives     Patients were shown how to choose, prep, and cook; substitutions and other options were given. Samples were offered. Recipes were given and questions answered. The patient above was in the [employer] for 45 minutes.       Electronically signed by Swetha DANIELS [address]  on 2020 at 12:01 PM

## 2020-08-31 ENCOUNTER — HOSPITAL ENCOUNTER (OUTPATIENT)
Dept: CARDIAC REHAB | Age: 75
Setting detail: THERAPIES SERIES
Discharge: HOME | End: 2020-08-31
Payer: MEDICARE

## 2020-08-31 PROCEDURE — G0423 INTENS CARDIAC REHAB NO EXER: HCPCS

## 2020-08-31 PROCEDURE — G0422 INTENS CARDIAC REHAB W/EXERC: HCPCS

## 2020-08-31 NOTE — PROGRESS NOTES
Video Education Report - ICR/CR    Name:  Monroe Michele     Date:  8/31/2020  MRN: [id number]     Session #:  32  Session Length: 40 min    Recommended Videos        []01 [employer] - Program Overview   34:22    []02 Overview of Pritikin Eating Plan   34:10    []03 Becoming a Mae Moder   33:08     []04 Diseases of Our Time - Part 1   34:22    []05 Calorie Density     33:39   []06 Label Reading - Part 1    32:15   []07 Move it      32.54   []08 Healthy Minds, Bodies, Hearts   32:14   []09 Dining Out - Part 1    32:28   []10 Heart Disease Risk Reduction   13:46   []42 Metabolic Syndrome and Belly Fat  31:52   []12 Facts on Fat     35:29   []13 Diseases of Our Time - Part 2   33:07   []14 Biology of Weight Control   32:36   []15 Biomechanical Limitations   35:20   []16 Nutrition Action Plan    34:23    Additional Videos           [x]31 Exercise Action Plan    32:26      Comments:  Video completed.

## 2020-08-31 NOTE — PROGRESS NOTES
Hospital Facility-Based Program  Phase 2 Cardiac Rehab Weekly Progress Report      Patient prescribed exercise:  9:00 class. 3 times per week in rehab, 1-4 times per week at home for the amount of sessions/weeks specified by insurance. Current Levels: Treadmill: 2. 8mph/2% for 15 minutes,  NuStep:  47 Navarro for 15 minutes, UBE: 26watts for 5 minutes. Progression Discussion: Maintain/Increase Aerobic exercise 35 minutes to work on endurance. Attempt to increase intensity by 5-20% for each modality this week. Try to increase intensities until Preston Wise rates the exercises a 13-17 on Chelle RPE.

## 2020-09-02 ENCOUNTER — HOSPITAL ENCOUNTER (OUTPATIENT)
Dept: CARDIAC REHAB | Age: 75
Setting detail: THERAPIES SERIES
Discharge: HOME | End: 2020-09-02
Payer: MEDICARE

## 2020-09-02 PROCEDURE — G0422 INTENS CARDIAC REHAB W/EXERC: HCPCS

## 2020-09-02 PROCEDURE — G0423 INTENS CARDIAC REHAB NO EXER: HCPCS

## 2020-09-04 ENCOUNTER — HOSPITAL ENCOUNTER (OUTPATIENT)
Dept: CARDIAC REHAB | Age: 75
Setting detail: THERAPIES SERIES
Discharge: HOME | End: 2020-09-04
Payer: MEDICARE

## 2020-09-04 PROCEDURE — G0423 INTENS CARDIAC REHAB NO EXER: HCPCS

## 2020-09-04 PROCEDURE — G0422 INTENS CARDIAC REHAB W/EXERC: HCPCS

## 2020-09-04 NOTE — PROGRESS NOTES
Silvana SÁNCHEZ.:  1945  Acct Number: [de-identified]  MRN:  [id number]                  [facility] COOKING SCHOOL WORKSHOP             Date: 2020        Session # ________   Dantekay Alea class covered:      () Adding Flavor     () Fast Breakfasts     () Salads and Dressings     () Soups and Sauces     () Simple Sides     () Appetizers and Snacks     (x) Delicious Desserts     () Plant Based Proteins     () Fast Evening Meals     () Weekend Breakfasts     () Efficiency Cooking     () Meat Alternatives     Patients were shown how to choose, prep, and cook; substitutions and other options were given. Samples were offered. Recipes were given and questions answered. The patient above was in the [employer] for 50 minutes.       Electronically signed by Nelida Levine on 2020 at 11:11 AM

## 2020-09-07 ENCOUNTER — APPOINTMENT (OUTPATIENT)
Dept: CARDIAC REHAB | Age: 75
End: 2020-09-07
Payer: MEDICARE

## 2020-09-07 ENCOUNTER — HOSPITAL ENCOUNTER (OUTPATIENT)
Dept: CARDIAC REHAB | Age: 75
Setting detail: THERAPIES SERIES
End: 2020-09-07
Payer: MEDICARE

## 2020-09-08 NOTE — PROGRESS NOTES
Hospital Facility-Based Program  Phase 2 Cardiac Rehab Weekly Progress Report      Patient prescribed exercise:  9:00 class. 3 times per week in rehab, 1-4 times per week at home for the amount of sessions/weeks specified by insurance. Current Levels: Treadmill: 2. 8mph/2% for 15 minutes, NuStep:  64 Eason for 15 minutes, UBE: 30 eason for 5 minutes. Progression Discussion: Maintain/Increase Aerobic exercise 30-45 minutes to work on endurance. Attempt to increase intensity by 5-20% for each modality this week. Try to increase intensities until Karen Trujillo rates the exercises a 13-17 on Chelle RPE.

## 2020-09-09 ENCOUNTER — HOSPITAL ENCOUNTER (OUTPATIENT)
Dept: CARDIAC REHAB | Age: 75
Setting detail: THERAPIES SERIES
Discharge: HOME | End: 2020-09-09
Payer: MEDICARE

## 2020-09-09 PROCEDURE — G0422 INTENS CARDIAC REHAB W/EXERC: HCPCS

## 2020-09-09 PROCEDURE — G0423 INTENS CARDIAC REHAB NO EXER: HCPCS

## 2020-09-11 ENCOUNTER — HOSPITAL ENCOUNTER (OUTPATIENT)
Dept: CARDIAC REHAB | Age: 75
Setting detail: THERAPIES SERIES
Discharge: HOME | End: 2020-09-11
Payer: MEDICARE

## 2020-09-11 PROCEDURE — G0423 INTENS CARDIAC REHAB NO EXER: HCPCS

## 2020-09-11 PROCEDURE — G0422 INTENS CARDIAC REHAB W/EXERC: HCPCS

## 2020-09-11 NOTE — PROGRESS NOTES
Laron SÁNCHEZ.:  1945  Acct Number: [de-identified]  MRN:  [id number]                  [facility] COOKING SCHOOL WORKSHOP             Date: 2020        Session # ________   Christiano Chacon class covered:      () Adding Flavor     () Fast Breakfasts     () Salads and Dressings     () Soups and Sauces     () Simple Sides     () Appetizers and Snacks     () Delicious Desserts     (X) Plant Based Proteins     () Fast Evening Meals     () Weekend Breakfasts     () Efficiency Cooking     () Meat Alternatives     Patients were shown how to choose, prep, and cook; substitutions and other options were given. Samples were offered. Recipes were given and questions answered. The patient above was in the [employer] for 45 minutes.       Electronically signed by Swetha DANIELS [facility] On 2020 at 11:03 AM

## 2020-09-14 ENCOUNTER — HOSPITAL ENCOUNTER (OUTPATIENT)
Dept: CARDIAC REHAB | Age: 75
Setting detail: THERAPIES SERIES
Discharge: HOME | End: 2020-09-14
Payer: MEDICARE

## 2020-09-14 PROCEDURE — G0423 INTENS CARDIAC REHAB NO EXER: HCPCS

## 2020-09-14 PROCEDURE — G0422 INTENS CARDIAC REHAB W/EXERC: HCPCS

## 2020-09-14 NOTE — PROGRESS NOTES
Hospital Facility-Based Program  Phase 2 Cardiac Rehab Weekly Progress Report      Patient prescribed exercise:  9:00 class. 3 times per week in rehab, 1-4 times per week at home for the amount of sessions/weeks specified by insurance. Current Levels: Treadmill: 3. 2mph/2% for 15 minutes,  NuStep:  60 Navarro for 10 minutes, UBE: 29 Navarro for 5 minutes. Progression Discussion: Maintain/Increase Aerobic exercise 15 minutes to work on endurance. Attempt to increase intensity by 5-20% for each modality this week. Try to increase intensities until Ann Quezada rates the exercises a 13-17 on Chelle RPE.

## 2020-09-16 ENCOUNTER — HOSPITAL ENCOUNTER (OUTPATIENT)
Dept: CARDIAC REHAB | Age: 75
Setting detail: THERAPIES SERIES
Discharge: HOME | End: 2020-09-16
Payer: MEDICARE

## 2020-09-16 PROCEDURE — G0422 INTENS CARDIAC REHAB W/EXERC: HCPCS

## 2020-09-16 PROCEDURE — G0423 INTENS CARDIAC REHAB NO EXER: HCPCS

## 2020-09-16 NOTE — PROGRESS NOTES
Korin SÁNCHEZ.:  1945    Acct Number: [de-identified]   MRN:  [id number]                             [facility] HEALTHY MIND-SET WORKSHOP             Date: 2020        Session #________    Fady Laughlin class covered:    ( )  Stress and Health Workshop:  [facility] patient will learn about the body's adaptive response that is triggered by a variety of stressors. Patient will gain insight into the toll that chronic stress takes on their health, both emotionally and physically. ( ) Taking Charge of Stress Workshop:  [facility] patient will learn and practice a variety of stress management techniques. Patient will be able to effectively apply coping mechanisms in perceived stressful situations. (X ) New Thoughts New Behaviors Workshop: [facility] patient will learn and practice techniques for developing effective health and lifestyle goals. Patient will be able to effectively apply the goal setting process learned to develop at least one new personal goal.     ( ) Managing Moods & Relationships Workshop:  [facility] patient will learn how emotional and chronic stress factors can impact their hearts. They will learn healthy ways to handle stress and utilize positive coping mechanisms. In addition, [facility] patient will learn ways to improve communication skills. Ann Quzeada actively participated and verbalized understanding. Total time in the Healthy Mind-Set class was 60 minutes.     Electronically signed by SERGIO Jauregui on 2020 at 12:47 PM

## 2020-09-18 ENCOUNTER — HOSPITAL ENCOUNTER (OUTPATIENT)
Dept: CARDIAC REHAB | Age: 75
Setting detail: THERAPIES SERIES
Discharge: HOME | End: 2020-09-18
Payer: MEDICARE

## 2020-09-18 PROCEDURE — G0423 INTENS CARDIAC REHAB NO EXER: HCPCS

## 2020-09-18 PROCEDURE — G0422 INTENS CARDIAC REHAB W/EXERC: HCPCS

## 2020-09-18 NOTE — PROGRESS NOTES
Monroe SÁNCHEZ.:  1945  Acct Number: [de-identified]  MRN:  [id number]                  [facility] COOKING SCHOOL WORKSHOP             Date: 2020        Session # ________   Zana Martinez class covered:      () Adding Flavor     () Fast Breakfasts     () Salads and Dressings     () Soups and Sauces     () Simple Sides     () Appetizers and Snacks     () Delicious Desserts     () Plant Based Proteins     (x) Fast Evening Meals     () Weekend Breakfasts     () Efficiency Cooking     () Meat Alternatives     Patients were shown how to choose, prep, and cook; substitutions and other options were given. Samples were offered. Recipes were given and questions answered. The patient above was in the [employer] for 60 minutes.       Electronically signed by Sabrina Pena on 2020 at 11:00 AM

## 2020-09-21 ENCOUNTER — HOSPITAL ENCOUNTER (OUTPATIENT)
Dept: CARDIAC REHAB | Age: 75
Setting detail: THERAPIES SERIES
Discharge: HOME | End: 2020-09-21
Payer: MEDICARE

## 2020-09-21 PROCEDURE — G0422 INTENS CARDIAC REHAB W/EXERC: HCPCS

## 2020-09-21 PROCEDURE — G0423 INTENS CARDIAC REHAB NO EXER: HCPCS

## 2020-09-21 NOTE — PROGRESS NOTES
Video Education Report - ICR/CR    Name:  Hodan Patten     Date:  9/21/2020  MRN: [id number]     Session #:  29  Session Length: 40 min    Recommended Videos        []01 [employer] - Program Overview   34:22    []02 Overview of [employer] Eating Plan   34:10    []03 Becoming a Pritikin    33:08     []04 Diseases of Our Time - Part 1   34:22    []05 Calorie Density     33:39   []06 Label Reading - Part 1    32:15   []07 Move it      32.54   []08 Healthy Minds, Bodies, Hearts   32:14   []09 Dining Out - Part 1    32:28   []10 Heart Disease Risk Reduction   57:71   []45 Metabolic Syndrome and Belly Fat  31:52   []12 Facts on Fat     35:29   [x]13 Diseases of Our Time - Part 2   33:07   []14 Biology of Weight Control   32:36   []15 Biomechanical Limitations   35:20   []16 Nutrition Action Plan    34:23    Comments:  Video completed.

## 2020-09-21 NOTE — PLAN OF CARE
[address] [place] [address] Facility-Based Program  Individualized Cardiac Treatment Plan    Patient Name:  Daniela Hitchcock  :  1945  Age:  69 y.o. MRN:  [id number]  Diagnosis: PCI & STEMI  Date of Event: 2020   Physician:  Gloria Yin Office Visit: 2020   Date Entered Program: 2020  Risk Stratifications: [] Low [x] Intermediate [] High  Allergies: No Known Allergies    COVID -19 Screen  Do you have any of the following symptoms:  [] Fever [] Cough [] SOB [] Muscle/Body Ache [] Loss of taste/smell [x] None    Have you traveled outside of the [place]? [] Yes      [x] No    Have you been around anyone who has tested Positive for COVID 19?  [] Yes      [x] No    The following Education has been completed with patient. [x] Wait in the lobby until we call you back to rehab. [x] You must wear a mask while in the medical center, and in cardiac rehab. Please bring your own. [x] Bring your own bottle of water with you. [x] You can not bring anyone with you into the medical center at this time. They are welcome to sit in their car and wait for you.     Individual Cardiac Treatment Plan -EXERCISE  INITIAL 30 DAY 60 DAY FINAL DAY   EXERCISE  ASSESSMENT/PLAN EXERCISE  REASSESSMENT EXERCISE   REASSESSMENT EXERCISE  DISCHARGE/FOLLOW-UP   Date: 2020 Date: 2020 Date: 20 Date: 2020   Session #1 Session # 25 Session # 40 Session # 76  Last session completed on **   Stages of Change Stages of Change Stages of Change Stages of Change   [] Pre Contemplation  [] Contemplation  [x] Preparation  [] Action  [] Maintenance  [] Relapse [] Pre Contemplation  [] Contemplation  [x] Preparation  [] Action  [] Maintenance  [] Relapse [] Pre Contemplation  [] Contemplation  [x] Preparation  [] Action  [] Maintenance  [] Relapse [] Pre Contemplation  [] Contemplation  [x] Preparation  [] Action  [] Maintenance  [] Relapse   EXERCISE ASSESSMENT EXERCISE ASSESSMENT EXERCISE ASSESSMENT EXERCISE ASSESSMENT   6 Min Walk Test  Distance walked:   0.13 miles  686 ft.  1.99 METs  Max HR:109 BPM      RPE:  12  THR:  112-126  Rhythm:  NSR   6 Min Walk Test  Distance walked:   ** miles  ** ft  ** METs  Max HR:** BPM      RPE:  **  %Change ft= **    Rhythm:  **   DASI: 4.6 METs DASI: 5.1 METs DASI: 5.6 METs DASI: 5.62 METs   Return to Work  [place] on returning to work? 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 [] Yes    [x] No     Return to work:  Retired - but has returned to volunteering in the [email or web address]    Orthopedic Limitations/  [x] Yes    [] No  If yes please list:  Both hips replaced, and foot problems      Orthopedic Limitations  *If patient has orthopedic issue:   Actions/  accomodations needed to make Ann Quezada successful : NA, Sarah Mak is doing well  Orthopedic Limitations  No AD Orthopedic Limitations  NO AD   Fall Risk  Fall risk assessed? [x] Yes      [] No    Balance Issues? [] Yes      [x] No     [] Walker [] Cane    [x] Safety issues reviewed      Fall Risk  *If patient is a fall risk, action needed to accommodate: NA Fall Risk  na Fall Risk   Home Exercise  [] Yes    [x] No   Home Exercise  [] Yes    [x] No   Home Exercise  [] Yes    [x] No  No aerobic Home Exercise  [x] Yes    [] No  No aerobic    Angina with Activity? [] Yes    [x] No     Angina with Activity? [] Yes    [x] No   Angina with Activity? [] Yes    [x] No  Angina Management: na Angina with Activity?   [] Yes    [x] No     EXERCISE PLAN EXERCISE PLAN EXERCISE PLAN EXERCISE PLAN   *Interventions* *Interventions* *Interventions* *Interventions*   Exercise Prescription  (per physician & CR staff) Exercise Prescription  (per physician & CR staff) Exercise Prescription  (per physician & CR staff) Exercise Prescription  (per physician & CR staff)   Cardiovascular Cardiovascular Cardiovascular No  Upper and Lower body strength training 2x/wk    Wt: 3#       Reps:  8-15    *Increase wt. after completing 15 reps with an RPE of <12/13. [x] Yes      [] No  Upper and Lower body strength training 2x/wk    Wt: 4#       Reps:  8-15    *Increase wt. after completing 15 reps with an RPE of <12/13. Continue Strength Training at home   [x] Exercise Log & Strength training handout given     Wt: 4#       Reps:  8-15    *Increase wt. after completing 15 reps with an RPE of <12/13. Flexibility Flexibility Flexibility Flexibility   [x] Yes      [] No  25 min session of Core Strength & Flexibility 1x/per week  Attends Core Strength & Flexibility   [x] Yes      [] No Attends Core Strength & Flexibility   [x] Yes      [] No Continue Core Strength & Flexibility at home   Home Exercise  *Young Daily planning to start walking 2 days/week for 10 min on days not in rehab. Home Exercise  *Huong Wright verbalizes planning to start walking 2 days/week for 15 min on days not in rehab. Home Exercise  *Huong Wright verbalizes planning to walk/ bike 3-4 days/week for 30 min on days not in rehab. [facility] plan to exercise at the [facility] 3 d /wk   *Education* *Education* *Education* *Education*   RPE Scale  [x] Yes      [] No  Exercise Safety  [x] Yes      [] No  Equipment Orientation  [x] Yes      [] No  S/S to Report  [x] Yes      [] No  Warm Up/Cool Down  [x] Yes      [] No  Home Exercise  [x] Yes      [] No   All Exercise Education Completed  [x] Yes      [] No   Exercise Education Recommended    Workshops  [x] Improving Performance  [x] Balance Training & Fall Prevention  [x] Exercise Biomechanics  [x] Resistance Training & Core Strength Exercise Education Attended/Date    impro performance - 7/20 Exercise Education Attended/Date    8/10/20Balance Training & [facility] [address] All Sessions Completed?     [x] Yes  [] No   *Goals* *Goals* *Goals* *Goals*   Initial Exercise Goals Exercise Goals  Exercise Goals Preparation  [x] Action  [] Maintenance  [] Relapse   NUTRITION ASSESSMENT NUTRITION ASSESSMENT NUTRITION ASSESSMENT NUTRITION ASSESSMENT   Weight Management  Weight: 164.2        Height: 5'0\"   BMI: 32 Weight Management  Weight: 161.8                  Weight Management  Weight: 155.8                 Weight Management  Weight: 152.2                    BMI: 29.7   Eating Plan  Current eating habits: small portion size and low fat foods Eating Plan  Changes: less salt no red meat less sugar and more fruits and veggies Eating Plan  Changes: more veggies &fruit, less meat, change in spices Eating Plan Improvements: less salt, fat, sugars, more fruits veggies and salads. Alcohol Use  [] none          [] daily  [] weekly      [x] special         Diet Assessment Tool:  RATE YOUR PLATE  *Given to patient to complete and return. Diet Assessment Tool:    Score: did not return /69      Diet Assessment Tool: RATE YOUR PLATE  Score: **/42   NUTRITION PLAN NUTRITION PLAN NUTRITION PLAN NUTRITION PLAN   *Interventions* *Interventions* *Interventions* *Interventions*   Initial Survey given Goal Setting Discussion:   [x] Yes      [] No      Follow Up Survey Reviewed & Goals Updated:     Professional Referral  Please check if needed. [] Dietitian Consult   [] Wt. Management Referral  [] Other:  Professional Referral  Please check if needed. [] Dietitian Consult   [] Wt. Management Referral  [] Other: Professional Referral  Please check if needed. [] Dietitian Consult   [] Wt. Management Referral  [] Other: Professional Referral  Please check if needed. [] Dietitian Consult   [] Wt.  Management Referral  [] Other:   *Education* *Education* *Education* *Education*   Nutritional Education Recommended    [x] 1:1 Registered Dietitian    Workshops  [x] Label Reading   [x] Menu  [x] Targeting Nutrition Priorities  [x] Fueling a Healthy Body   Nutritional Education Attended/Date    Fueling a healthy body - 7/8    Targeting nutrition prior - 7/29   Nutritional Education Attended/Date    8/17/20 Label Reading All Sessions Completed?     [x] Yes  [] No    Label reading - 8/17/2020    Menu - 9/9/2020   Cooking School  Recommended     [x] Adding Flavor  [x] Fast & Healthy     Breakfasts  [x] Salads & Dressings  [x] Soups & Simple     Sauces  [x] Simple Sides  [x] Appetizers &     Snacks  [x] Delicious Desserts  [x] Plant Proteins  [x] Fast Evening Meals  [x] Weekend Breakfasts  [x] Cook once, Eat       twice  [x] [employer]  Sessions Completed    Adding flavor - 7/24    Cook once eat twice - 7/10    Meat al - 7/17   Cooking School  Sessions Completed    7/31/20   Fast & Healthy     Breakfasts    8/7/20   Salads & Dressings    8/14/20   Soups & Simple     Sauces    8/21/20 Simple S[employer] Cooking School    # of sessions completed:  12   *Goals* *Goals* *Goals* *Goals*   Wing nutritional goals are as follows:  Complete and return diet survey Wing nutritional goals are as follows:  [x] Attend Nutrition Workshops  [x] Attend 1:1   [x] Attend Cooking Classes   Wing nutritional goals are as follows:  [x] Attend Nutrition Workshops  [x] Attend 1:1   [x] Attend Cooking Classes  [x] Complete and return diet survey  [] ** Rufina achieved nutritional goals   [x] Yes    [] No    Use knowledge gained to continue Pritikin eating plan at home       Individual Cardiac Treatment Plan - Psychosocial  PSYCHOSOCIAL  ASSESSMENT/PLAN PSYCHOSOCIAL  REASSESSMENT PSYCHOSOCIAL   REASSESSMENT PSYCHOSOCIAL  DISCHARGE/FOLLOW-UP   Stages of Change Stages of Change Stages of Change Stages of Change   [] Pre Contemplation  [] Contemplation  [x] Preparation  [] Action  [] Maintenance  [] Relapse [] Pre Contemplation  [] Contemplation  [x] Preparation  [] Action  [] Maintenance  [] Relapse [] Pre Contemplation  [] Contemplation  [] Preparation  [] Action  [x] Maintenance  [] Relapse [] Pre Contemplation  [] Contemplation  [] Preparation  [] Action  [x] Maintenance  [] Relapse   PSYCHOSOCIAL ASSESSMENT PSYCHOSOCIAL ASSESSMENT PSYCHOSOCIAL ASSESSMENT PSYCHOSOCIAL ASSESSMENT   Behavioral Outcomes Behavioral Outcomes Behavioral Outcomes Behavioral Outcomes   Tool Used:  Magdalena Almeida, Quality of Life Index, Cardiac Version IV  *Given to patient to complete. Tool Used:    Magdalena Almeida, Quality of Life Index, Cardiac Version IV     QOL Index Score: 28.64  HF:28.62  S&E:30  P&S: 28.07  Family: 27.6  Tool Used:     Magdalena Almeida, Quality of Life Index, Cardiac Version IV    QOL Index Score: **  HF:**  S&E:**  P&S: **  Family: **   PHQ-9 score 2  Depression Severity  [x]Minimal  []Mild   []Moderate  []Moderately Severe  []Severe   PHQ-9 score **  Depression Severity  []Minimal  []Mild   []Moderate  []Moderately Severe []Severe   Does patient have Family Support? [x] Yes      [] No  No signs of marital/family distress      Within the Past Month:  *Have you wished you were dead or wished you could go to sleep and not wake up? [] Yes      [x] No  *Have you had any thoughts of killing yourself? [] Yes      [x] No        Using a scale of 0-10, 0=none, 10=very:   Rate your depression: 2  Rate your anxiety:  5  Using a scale of 0-10, 0=none, 10=very:   Rate your depression: 0  Rate your anxiety:  1 Using a scale of 0-10, 0=none, 10=very:   Rate your depression: 0  Rate your anxiety:  1 Using a scale of 0-10, 0=none, 10=very:   Rate your depression: 0  Rate your anxiety:  1   Signs and Symptoms of Depression Present? [] Yes      [x] No   Signs and Symptoms of Depression Present? [] Yes      [x] No Signs and Symptoms of Depression Present? [] Yes      [x] No   Signs and Symptoms of Depression Present? [] Yes      [x] No     Signs and Symptoms of Anxiety Present? [x] Yes      [] No  If yes, please explain:  Pt stated that she has panic attacks sometimes. Signs and Symptoms of Anxiety Present?     [x] Yes      [] No  If yes, please explain:  Has a history Referrals:  *Please check if needed  [] Diabetes Clinic  [] Lipid Clinic   [] Other:   Preventative Medication Preventative Medication Preventative Medication Preventative Medication   Aspirin  [x] Yes    [] No  Blood Thinner: Clopidogrel/Effient/Brillinta  [x] Yes    [] No  Beta Blocker  [x] Yes    [] No  Ace Inhibitor  [] Yes    [x] No  Statin/Lipid Lowering  [x] Yes    [] No Medication Changes? [] Yes    [x] No Medication Changes? [] Yes    [x] No Medication Changes? [] Yes    [x] No   *Education* *Education* *Education* *Education*   Does Rufina require any additional education? [] Yes    [x] No   Does Preston Norse require any additional education? [] Yes    [x] No Does Alvia Norse require any additional education? [] Yes    [x] No Does Alvia Norse require any additional education?   [] Yes    [x] No   Recommended Additional Educational Videos    Medical  [] Hypertension & Heart Disease  [] Decoding Lab Results  [] Aging Enhancing Your QoL  [] Sleep Disorders  Exercise  [] Body Composition  [] Improve Performance  [] Exercise Action Plan  [] Intro to Yoga  Behavioral  [] How Our Thoughts Can Heal Our Hearts  [] Smoking Cessation  Nutrition  [] Planning Your Eating Strategy  [] Lable Reading- Part 2  [] Dining Out - Part 2  [] Targeting Your Nutrition Priorities  [] Fueling a Healthy Body  [] Menu Workshop  [employer] [] Breakfast & Snacks  [] [employer] Salads & Dressing  [] [address] [place] [address]  [] Soups & Desserts   Additional Educational Videos Completed Additional Educational Videos Completed Additional Educational Videos Completed       *Goals* *Goals* *Goals* *Goals*   Rufina's risk factor/education goals are as follows:    [x] Optimal BP <140/90  [] Blood Sugar <120  [x] Attend recommended video education sessions  [x] Takes medications as prescribed 100% of the time   [] ** Rufina's risk factor/education goals are as follows:    [x] Optimal BP <140/90  [] Blood Sugar <120  [x] Attend recommended video education sessions  [x] Takes medications as prescribed 100% of the time    Rufina's risk factor/education goals are as follows:    [x] Optimal BP <140/90  [] Blood Sugar <120  [x] Attend recommended video education sessions  [x] Takes medications as prescribed 100% of the time   [] ** Rufina achieved risk factor goals?   [x] Yes    [] No       Monitored telemetry has revealed NSR  [] documented arrhythmia at increasing workloads  [] associated symptoms  Monitored telemetry has revealed NSR  [] documented arrhythmia at increasing workloads  [] associated symptoms ** Monitored telemetry has revealed NSR   Monitored telemetry has revealed NSR  [] documented arrhythmia at increasing workloads  [] associated symptoms    Physician Response    [x] Cardiac rehab is reasonably and medically necessary for continuous cardiac monitoring surveillance  of patient's cardiac activity  [x] Initiate continuous telemerty monitoring and notify me with any concerns  [] Other   Physician Response    [x] Cardiac rehab is reasonably and medically necessary for continuous cardiac monitoring surveillance  of patient's cardiac activity  [x] Continue continuous telemerty monitoring and notify me with any concerns  [] Other     Physician Response    [x] Cardiac rehab is reasonably and medically necessary for continuous cardiac monitoring surveillance  of patient's cardiac activity  [x] Continue continuous telemerty monitoring and notify me with any concerns   [] Other          Cosigned by:  Mirian Stephen MD at 7/6/2020  8:36 AM    Revision History     Date/Time  User  Provider Type  Action    7/6/2020  8:36 AM  Mirian Stephen MD  Physician  Cosign    7/2/2020  1:02 PM  Laurent Salas  Exercise Physiologist  Sign      Cosigned by:  Mirian Stephen MD at 7/29/2020  1:44 PM    Revision History     Date/Time  User  Provider Type  Action    7/29/2020  1:44 PM  Mirian Stephen MD  Physician  Cosign    7/29/2020  9:28 AM  Laurent Salas  Exercise Physiologist  Sign Revision History     Date/Time  User  Provider Type  Action    8/25/2020  9:01 PM  Nettie Hooks MD  Physician  Cosign    8/24/2020  9:30 AM  Ludy Prakash  Exercise Physiologist  Sign

## 2020-09-23 ENCOUNTER — HOSPITAL ENCOUNTER (OUTPATIENT)
Dept: CARDIAC REHAB | Age: 75
Setting detail: THERAPIES SERIES
Discharge: HOME | End: 2020-09-23
Payer: MEDICARE

## 2020-09-23 PROCEDURE — G0423 INTENS CARDIAC REHAB NO EXER: HCPCS

## 2020-09-23 PROCEDURE — G0422 INTENS CARDIAC REHAB W/EXERC: HCPCS

## 2020-09-23 NOTE — PROGRESS NOTES
Video Education Report - ICR/CR    Name:  Lisa Weiner     Date:  9/23/2020  MRN: [id number]     Session #:  28  Session Length: 45 min    Additional Videos         []17 Hypertension & Heart Disease   32:39     []18 Cooking Breakfasts and Snacks  32:00   []19 Planning Your Eating Strategy   33:30   []20 Label Reading - Part 2    32:36  []21 Cooking Soups and Desserts   31:41  []22 How Our Thoughts Can Heal Our Hearts 33:05  []23 Targeting Your Nutrition Priorities  33:58  []24 Healthy Salads & Dressings   35:32  []25 Dining Out - Part 2    32:35  []26 Cooking Dinner and Sides   35:06  []27 Sleep Disorders     33:14  []28 Menu Workshop     32:06  []29 Decoding Lab Results    32:42     []30 Vitamins and Minerals    32:54  []31 Exercise Action Plan    32:26  []32 Body Composition    34:03  []33 Improving Performance    32:13  []34 Fueling a Healthy Body    31:32  [x]35 Introduction to Yoga    33:47  []36 Aging-Enhancing the Quality of Your Life 33:22  []37 Smoking Cessation    36:19    Comments:  Video completed, group discussion

## 2020-09-25 ENCOUNTER — HOSPITAL ENCOUNTER (OUTPATIENT)
Dept: CARDIAC REHAB | Age: 75
Setting detail: THERAPIES SERIES
Discharge: HOME | End: 2020-09-25
Payer: MEDICARE

## 2020-09-25 PROCEDURE — G0423 INTENS CARDIAC REHAB NO EXER: HCPCS

## 2020-09-25 PROCEDURE — G0422 INTENS CARDIAC REHAB W/EXERC: HCPCS

## 2020-09-25 NOTE — PLAN OF CARE
[address] [place] [address] Facility-Based Program  Individualized Cardiac Treatment Plan    Patient Name:  Laura Clark  :  1945  Age:  69 y.o. MRN:  [id number]  Diagnosis: PCI & STEMI  Date of Event: 2020   Physician:  Nawaf Navarrete  Next Office Visit: 2020   Date Entered Program: 2020  Risk Stratifications: [] Low [x] Intermediate [] High  Allergies: No Known Allergies    COVID -19 Screen  Do you have any of the following symptoms:  [] Fever [] Cough [] SOB [] Muscle/Body Ache [] Loss of taste/smell [x] None    Have you traveled outside of the [place]? [] Yes      [x] No    Have you been around anyone who has tested Positive for COVID 19?  [] Yes      [x] No    The following Education has been completed with patient. [x] Wait in the lobby until we call you back to rehab. [x] You must wear a mask while in the medical center, and in cardiac rehab. Please bring your own. [x] Bring your own bottle of water with you. [x] You can not bring anyone with you into the medical center at this time. They are welcome to sit in their car and wait for you.     Individual Cardiac Treatment Plan -EXERCISE  INITIAL 30 DAY 60 DAY FINAL DAY   EXERCISE  ASSESSMENT/PLAN EXERCISE  REASSESSMENT EXERCISE   REASSESSMENT EXERCISE  DISCHARGE/FOLLOW-UP   Date: 2020 Date: 2020 Date: 20 Date: 2020   Session #1 Session # 25 Session # 40 Session # 67  Last session completed on 2020   Stages of Change Stages of Change Stages of Change Stages of Change   [] Pre Contemplation  [] Contemplation  [x] Preparation  [] Action  [] Maintenance  [] Relapse [] Pre Contemplation  [] Contemplation  [x] Preparation  [] Action  [] Maintenance  [] Relapse [] Pre Contemplation  [] Contemplation  [x] Preparation  [] Action  [] Maintenance  [] Relapse [] Pre Contemplation  [] Contemplation  [] Preparation  [x] Action  [] Maintenance  [] Relapse   EXERCISE ASSESSMENT EXERCISE ASSESSMENT EXERCISE ASSESSMENT EXERCISE ASSESSMENT   6 Min Walk Test  Distance walked:   0.13 miles  686 ft.  1.99 METs  Max HR:109 BPM      RPE:  12  THR:  112-126  Rhythm:  NSR   6 Min Walk Test  Distance walked:   0.31 miles  1636 ft  3.4 METs  Max HR:124 BPM      RPE:  12  %Change ft= 138%    Rhythm:  NSR   DASI: 4.6 METs DASI: 5.1 METs DASI: 5.6 METs DASI: 5.62 METs   Return to Work  [employer] on returning to work? 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 [] Yes    [x] No     Return to work:  Retired - but has returned to volunteering in the [employer]    Orthopedic Limitations/  [x] Yes    [] No  If yes please list:  Both hips replaced, and foot problems      Orthopedic Limitations  *If patient has orthopedic issue:   Actions/  accomodations needed to make Preston Wise successful : NA, Bethany Rosas is doing well  Orthopedic Limitations  No AD Orthopedic Limitations  NO AD   Fall Risk  Fall risk assessed? [x] Yes      [] No    Balance Issues? [] Yes      [x] No     [] Walker [] Cane    [x] Safety issues reviewed      Fall Risk  *If patient is a fall risk, action needed to accommodate: NA Fall Risk  na Fall Risk   Home Exercise  [] Yes    [x] No   Home Exercise  [] Yes    [x] No   Home Exercise  [] Yes    [x] No  No aerobic Home Exercise  [x] Yes    [] No  No aerobic    Angina with Activity? [] Yes    [x] No     Angina with Activity? [] Yes    [x] No   Angina with Activity? [] Yes    [x] No  Angina Management: na Angina with Activity?   [] Yes    [x] No     EXERCISE PLAN EXERCISE PLAN EXERCISE PLAN EXERCISE PLAN   *Interventions* *Interventions* *Interventions* *Interventions*   Exercise Prescription  (per physician & CR staff) Exercise Prescription  (per physician & CR staff) Exercise Prescription  (per physician & CR staff) Exercise Prescription  (per physician & CR staff)   Cardiovascular Cardiovascular Cardiovascular Cardiovascular   Mode:    [x] Treadmill (TM)  [x] Schwinn Airdyne (AD)  [x] Arms Ergometer (AE)   MODE:    [x] Treadmill (TM)  [x] Schwinn Airdyne (AD)  [x] Arms Ergometer (AE)  [x] NuStep   MODE:    [x] Treadmill (TM)  [] Schwinn Airdyne (AD)  [x] Arms Ergometer (AE)  [x] NuStep  [] Elliptical (E) MODE:    [x] Treadmill (TM)  [x] Arms Ergometer (AE)  [x] NuStep     Initial Workloads  TM: [email or web address] 2.1 METs  AD: 0.5 level =1.9 METs  NS:26  Navarro=1.9 METs  AE: 0.2 level = 1.3 METs Current Workloads  TM: 2.2 @ 0%= 2.7 METs  AD: 0.6 level = 2.2 METs  NS:   38Watts= 2.2 METs  AE: 0.5 level = 2.0 METs Current Workloads  TM:  2.8@ 1%=3.6  METs    NS: 50  Navarro= 2.5 METs  AE: 26 level = 1.9METs Current Workloads  TM: 3.2 @ 2%= 4.4 METs  NS: 60  Navarro= 2.8 METs  AE:  34w = 2.1 METs     Frequency:    ICR: 3x/week  Home: 2-3x/wk Frequency:   ICR: 3x/week  Home: 3x/wk Frequency:  ICR: 3x/week  Home: 3-4x/wk Frequency:  Henri Fernandez will continue exercise at  5-7 days/week   Duration:   Total aerobic exercise = 30-45 min    5-8 min/mode Duration:  Total aerobic exercises = 35 min     15min/mode Duration:  Total aerobic exercises = 35 min     15min/mode Duration:  Total erobic exercise =  60-90 min   Intensity:   MET Level = 1.99  RPE = 12-15 Intensity:  Max MET Level = 2.7  RPE = 12-15 Intensity:  Max MET Level = 3.6  RPE = 12-15 Intensity:  Max MET Level = 4.4 RPE = 12-15   Progression: increase aerobic activity up to 15 min over next 4 weeks by increasing time 2-3 min/week. Progression: increase aerobic intensity as tolerated by the pt   Progression: Increase intensity 5-10% over the next 4 weeks as tolerated. Progression:  Increase time/intensity when RPE <13, and HR is in [facility]   [x] Yes      [] No  Upper and Lower body strength training 2x/wk    Wt: 2#       Reps:  8-15    *Increase wt.  after completing 15 reps with an RPE of <12/13. [x] Yes      [] No  Upper and Lower body strength training 2x/wk    Wt: 3#       Reps:  8-15    *Increase wt. after completing 15 reps with an RPE of <12/13. [x] Yes      [] No  Upper and Lower body strength training 2x/wk    Wt: 4#       Reps:  8-15    *Increase wt. after completing 15 reps with an RPE of <12/13. Continue Strength Training at home   [x] Exercise Log & Strength training handout given     Wt: 4#       Reps:  8-15    *Increase wt. after completing 15 reps with an RPE of <12/13. Flexibility Flexibility Flexibility Flexibility   [x] Yes      [] No  25 min session of Core Strength & Flexibility 1x/per week  Attends Core Strength & Flexibility   [x] Yes      [] No Attends Core Strength & Flexibility   [x] Yes      [] No Continue Core Strength & Flexibility at home   Home Exercise  *Jayda Castro planning to start walking 2 days/week for 10 min on days not in rehab. Home Exercise  *Noble Brannon verbalizes planning to start walking 2 days/week for 15 min on days not in rehab. Home Exercise  *Noble Brannon verbalizes planning to walk/ bike 3-4 days/week for 30 min on days not in rehab. [address] [facility] plan to exercise at the [facility] 3 d /wk   *Education* *Education* *Education* *Education*   RPE Scale  [x] Yes      [] No  Exercise Safety  [x] Yes      [] No  Equipment Orientation  [x] Yes      [] No  S/S to Report  [x] Yes      [] No  Warm Up/Cool Down  [x] Yes      [] No  Home Exercise  [x] Yes      [] No   All Exercise Education Completed  [x] Yes      [] No   Exercise Education Recommended    Workshops  [x] Improving Performance  [x] Balance Training & Fall Prevention  [x] Exercise Biomechanics  [x] Resistance Training & Core Strength Exercise Education Attended/Date    improv performance - 7/20 Exercise Education Attended/Date    8/10/20Balance Training & [address] [facility]  [address] All Sessions Completed?     [x] Yes  [] No    Intro to yoga - 9/23/2020   *Goals* *Goals* *Goals* *Goals* Initial Exercise Goals Exercise Goals  Exercise Goals  Exercise Goals   Rufina plans to:  [x] Attend exercise sessions 3x/wk  [x] initiate home exercise 2-3x/wk for 10-20 min  [x] Increase 6 min walk distance by 10%  [x] Attend Exercise workshops Rufina plans to:  [x] Attend exercise sessions 3x/wk  [x] continue home exercise 2-3x/wk for 20-30 min  [x] Attend Exercise workshops Rufina's plans to:  [x] Attend exercise sessions 3x/wk  [x] continue home exercise 3-4x/wk for 30-45 min  [x] Determine plan of exercise following rehab  [x] Attend Exercise workshops Rufina achieved exercise goals? [x]  Yes    [] No  [x] Increased 6 min walk distance by 10%  [x] Currently exercising 30-60 min/day, 5-7days/wk   [x] Plans to continue exercise on own  [x] Plans to join a local fitness center to continue exercise     Return to ADL or Hobbies:  Sydney Stephens would like to improve strength and endurance so she does not become so short of breath as easily Return to ADL or Hobbies:  Sydney Stephens would like to improve strength and endurance so she is able to return to walk longer distances without being so short of breath  Return to ADL or Hobbies:  Sydney Stephens would like to improve strength and endurance so she is able to return to all previous activities Return to ADL or Hobbies:  Sydney Stephens is able to do everything she wants to do. *MET level required for above goal:  4.0 METs MET level Achieved:  4. 0METs MET level Achieved:  3.6 METs MET level Achieved:  4.8METs     Individual Cardiac Treatment Plan - Nutrition  NUTRITION  ASSESSMENT/PLAN NUTRITION  REASSESSMENT NUTRITION   REASSESSMENT NUTRITION  DISCHARGE/FOLLOW-UP   Stages of Change Stages of Change Stages of Change Stages of Change   [] Pre Contemplation  [x] Contemplation  [] Preparation  [] Action  [] Maintenance  [] Relapse [] Pre Contemplation  [] Contemplation  [x] Preparation  [] Action  [] Maintenance  [] Relapse [] Pre Contemplation  [] Contemplation  [] Preparation  [x] Action  [] Maintenance  [] Relapse [] Pre Contemplation  [] Contemplation  [] Preparation  [x] Action  [] Maintenance  [] Relapse   NUTRITION ASSESSMENT NUTRITION ASSESSMENT NUTRITION ASSESSMENT NUTRITION ASSESSMENT   Weight Management  Weight: 164.2        Height: 5'0\"   BMI: 32 Weight Management  Weight: 161.8                  Weight Management  Weight: 155.8                 Weight Management  Weight: 152.2                    BMI: 29.7   Eating Plan  Current eating habits: small portion size and low fat foods Eating Plan  Changes: less salt no red meat less sugar and more fruits and veggies Eating Plan  Changes: more veggies &fruit, less meat, change in spices Eating Plan Improvements: less salt, fat, sugars, more fruits veggies and salads. Alcohol Use  [] none          [] daily  [] weekly      [x] special         Diet Assessment Tool:  RATE YOUR PLATE  *Given to patient to complete and return. Diet Assessment Tool:    Score: did not return /69      Diet Assessment Tool: RATE YOUR PLATE  Score: 49/59   NUTRITION PLAN NUTRITION PLAN NUTRITION PLAN NUTRITION PLAN   *Interventions* *Interventions* *Interventions* *Interventions*   Initial Survey given Goal Setting Discussion:   [x] Yes      [] No      Follow Up Survey Reviewed & Goals Updated:     Professional Referral  Please check if needed. [] Dietitian Consult   [] Wt. Management Referral  [] Other:  Professional Referral  Please check if needed. [] Dietitian Consult   [] Wt. Management Referral  [] Other: Professional Referral  Please check if needed. [] Dietitian Consult   [] Wt. Management Referral  [] Other: Professional Referral  Please check if needed. [] Dietitian Consult   [] Wt.  Management Referral  [] Other:   *Education* *Education* *Education* *Education*   Nutritional Education Recommended    [x] 1:1 Registered Dietitian    Workshops  [x] Label Reading   [x] Menu  [x] Targeting Nutrition Priorities  [x] Fueling a Healthy Body   Nutritional Education Attended/Date    Fueling a healthy body - 7/8    Targeting nutrition prior - 7/29   Nutritional Education Attended/Date    8/17/20 Label Reading All Sessions Completed?     [x] Yes  [] No    Label reading - 8/17/2020    Menu - 9/9/2020   Cooking School  Recommended     [x] Adding Flavor  [x] Fast & Healthy     Breakfasts  [x] Salads & Dressings  [x] Soups & Simple     Sauces  [x] Simple Sides  [x] Appetizers &     Snacks  [x] Delicious Desserts  [x] Plant Proteins  [x] Fast Evening Meals  [x] Weekend Breakfasts  [x] Cook once, Eat       twice  [x] [employer]  Sessions Completed    Adding flavor - 7/24    Alina Silvan once eat twice - 7/10    Meat al - 7/17   Cooking School  Sessions Completed    7/31/20   Fast & Healthy     Breakfasts    8/7/20   Salads & Dressings    8/14/20   Soups & Simple     Sauces    8/21/20 Simple Sides Cooking School    # of sessions completed:  12    Weekend break  - 9/25/2020    Neal and snacks - 4/99/1196    delic desserts - 3/5/8090    Plant protein 9/11/2020    Fast evening meals - 9/18/2020   *Goals* *Goals* *Goals* *Goals*   Wing nutritional goals are as follows:  Complete and return diet survey Wing nutritional goals are as follows:  [x] Attend Nutrition Workshops  [x] Attend 1:1   [x] Attend Cooking Classes   Wing nutritional goals are as follows:  [x] Attend Nutrition Workshops  [x] Attend 1:1   [x] Attend Cooking Classes  [x] Complete and return diet survey  [] ** Rufina achieved nutritional goals   [x] Yes    [] No    Use knowledge gained to continue Pritikin eating plan at home       Individual Cardiac Treatment Plan - Psychosocial  PSYCHOSOCIAL  ASSESSMENT/PLAN PSYCHOSOCIAL  REASSESSMENT PSYCHOSOCIAL   REASSESSMENT PSYCHOSOCIAL  DISCHARGE/FOLLOW-UP   Stages of Change Stages of Change Stages of Change Stages of Change   [] Pre Contemplation  [] Contemplation  [x] Preparation  [] Action  [] Maintenance  [] Relapse [] Pre Contemplation  [] Contemplation  [x] Preparation  [] Action  [] Maintenance  [] Relapse [] Pre Contemplation  [] Contemplation  [] Preparation  [] Action  [x] Maintenance  [] Relapse [] Pre Contemplation  [] Contemplation  [] Preparation  [] Action  [x] Maintenance  [] Relapse   PSYCHOSOCIAL ASSESSMENT PSYCHOSOCIAL ASSESSMENT PSYCHOSOCIAL ASSESSMENT PSYCHOSOCIAL ASSESSMENT   Behavioral Outcomes Behavioral Outcomes Behavioral Outcomes Behavioral Outcomes   Tool Used:  Magdalena Almeida, Quality of Life Index, Cardiac Version IV  *Given to patient to complete. Tool Used:    Magdalena Almeida, Quality of Life Index, Cardiac Version IV     QOL Index Score: 28.64  HF:28.62  S&E:30  P&S: 28.07  Family: 27.6  Tool Used:     Magdalena Almeida, Quality of Life Index, Cardiac Version IV    QOL Index Score: 29.76  HF:19.42  S&E:30  P&S: 30  Family: 30   PHQ-9 score 2  Depression Severity  [x]Minimal  []Mild   []Moderate  []Moderately Severe  []Severe   PHQ-9 score 1  Depression Severity  [x]Minimal  []Mild   []Moderate  []Moderately Severe []Severe   Does patient have Family Support? [x] Yes      [] No  No signs of marital/family distress      Within the Past Month:  *Have you wished you were dead or wished you could go to sleep and not wake up? [] Yes      [x] No  *Have you had any thoughts of killing yourself? [] Yes      [x] No        Using a scale of 0-10, 0=none, 10=very:   Rate your depression: 2  Rate your anxiety:  5  Using a scale of 0-10, 0=none, 10=very:   Rate your depression: 0  Rate your anxiety:  1 Using a scale of 0-10, 0=none, 10=very:   Rate your depression: 0  Rate your anxiety:  1 Using a scale of 0-10, 0=none, 10=very:   Rate your depression: 0  Rate your anxiety:  1   Signs and Symptoms of Depression Present? [] Yes      [x] No   Signs and Symptoms of Depression Present? [] Yes      [x] No Signs and Symptoms of Depression Present? [] Yes      [x] No   Signs and Symptoms of Depression Present?     [] Yes      [x] No     Signs and Symptoms of Anxiety Present? [x] Yes      [] No  If yes, please explain:  Pt stated that she has panic attacks sometimes. Signs and Symptoms of Anxiety Present? [x] Yes      [] No  If yes, please explain:  Has a history of panic attacks Signs and Symptoms of Anxiety Present? [] Yes      [x] No   Signs and Symptoms of Anxiety Present? [] Yes      [x] No     [] Patient has poor eye contact   [] Flat affect present. [] Signs of anxiety, anger or hostility    [] Signs social isolation present. []  Signs of alcohol or substance abuse      PSYCHOSOCIAL PLAN PSYCHOSOCIAL PLAN PSYCHOSOCIAL PLAN PSYCHOSOCIAL PLAN   *Interventions* *Interventions* *Interventions* *Interventions*   *Please check if needed  [] Psych Consult  [] Physician Referral  [] Stress Management Skills *Please check if needed  [] Psych Consult  [] Physician Referral  [x] Stress Management Skills *Please check if needed  [] Psych Consult  [] Physician Referral  [x] Stress Management Skills *Please check if needed  [] Psych Consult  [] Physician Referral  [x] Stress Management Skills   Is patient currently taking anti-depressant or anti-anxiety medications? [x] Yes      [] No  If yes, please list medications:  venlafaxine Change in anti-depressant or anti-anxiety medications? [] Yes      [x] No   Change in anti-depressant or anti-anxiety medications? [] Yes      [x] No   Change in anti-depressant or anti-anxiety medications?   [] Yes      [x] No     *Education* *Education* *Education* *Education*   Healthy Mind-Set Workshops Recommended  [x] Stress & Health  [x] Taking Charge of Stress  [x] New Thoughts, New Behaviors  [x] Managing Moods & Relationships Healthy Mind-Set Workshops Attended/Date    Manage moods - 7/15 Healthy Mind-Set Workshops Attended/Date  8/5/20 Stress & Health Healthy Mind-Set Workshops  Completed  [x] Yes      [] No    New thoughts - 9/16/2020   *Goals* *Goals* *Goals* *Goals*   Rufina's psychosocial goals are as Cessation/Relapse Prevention Scheduled? Not needed  na Smoking Cessation Counseling attended  [] Yes      [x] No  Not needed   Professional Referrals:  *Please check if needed  [] Diabetes Clinic  [] Lipid Clinic   [] Other:    Professional Referrals:  *Please check if needed  [] Diabetes Clinic  [] Lipid Clinic   [] Other:   Preventative Medication Preventative Medication Preventative Medication Preventative Medication   Aspirin  [x] Yes    [] No  Blood Thinner: Clopidogrel/Effient/Brillinta  [x] Yes    [] No  Beta Blocker  [x] Yes    [] No  Ace Inhibitor  [] Yes    [x] No  Statin/Lipid Lowering  [x] Yes    [] No Medication Changes? [] Yes    [x] No Medication Changes? [] Yes    [x] No Medication Changes? [] Yes    [x] No   *Education* *Education* *Education* *Education*   Does Rufina require any additional education? [] Yes    [x] No   Does Paddy Vlad require any additional education? [] Yes    [x] No Does Paddy Vlad require any additional education? [] Yes    [x] No Does Paddy Vlad require any additional education?   [] Yes    [x] No   Recommended Additional Educational Videos    Medical  [] Hypertension & Heart Disease  [] Decoding Lab Results  [] Aging Enhancing Your QoL  [] Sleep Disorders  Exercise  [] Body Composition  [] Improve Performance  [] Exercise Action Plan  [] Intro to Yoga  Behavioral  [] How Our Thoughts Can Heal Our Hearts  [] Smoking Cessation  Nutrition  [] Planning Your Eating Strategy  [] Lable Reading- Part 2  [] Dining Out - Part 2  [] Targeting Your Nutrition Priorities  [] Fueling a Healthy Body  [] Menu Workshop  [employer] [] Breakfast & Snacks  [] [employer] Salads & Dressing  [] [address] [place] [address]  [] Soups & Desserts   Additional Educational Videos Completed Additional Educational Videos Completed Additional Educational Videos Completed       *Goals* *Goals* *Goals* *Goals*   Rufina's risk factor/education goals are as follows:    [x] Optimal BP <140/90  [] Blood Sugar <120  [x] Attend recommended video education sessions  [x] Takes medications as prescribed 100% of the time   [] ** Rufina's risk factor/education goals are as follows:    [x] Optimal BP <140/90  [] Blood Sugar <120  [x] Attend recommended video education sessions  [x] Takes medications as prescribed 100% of the time    Rufina's risk factor/education goals are as follows:    [x] Optimal BP <140/90  [] Blood Sugar <120  [x] Attend recommended video education sessions  [x] Takes medications as prescribed 100% of the time   [] ** Rufina achieved risk factor goals?   [x] Yes    [] No       Monitored telemetry has revealed NSR  [] documented arrhythmia at increasing workloads  [] associated symptoms  Monitored telemetry has revealed NSR  [] documented arrhythmia at increasing workloads  [] associated symptoms ** Monitored telemetry has revealed NSR   Monitored telemetry has revealed NSR  [] documented arrhythmia at increasing workloads  [] associated symptoms    Physician Response    [x] Cardiac rehab is reasonably and medically necessary for continuous cardiac monitoring surveillance  of patient's cardiac activity  [x] Initiate continuous telemerty monitoring and notify me with any concerns  [] Other   Physician Response    [x] Cardiac rehab is reasonably and medically necessary for continuous cardiac monitoring surveillance  of patient's cardiac activity  [x] Continue continuous telemerty monitoring and notify me with any concerns  [] Other     Physician Response    [x] Cardiac rehab is reasonably and medically necessary for continuous cardiac monitoring surveillance  of patient's cardiac activity  [x] Continue continuous telemerty monitoring and notify me with any concerns   [] Other          Cosigned by:  Kaycee Turcios MD at 7/6/2020  8:36 AM    Revision History     Date/Time  User  Provider Type  Action    7/6/2020  8:36 AM  Kaycee Turcios MD  Physician  Cosign    7/2/2020  1:02 PM  Jeanne Ramsay  Exercise Physiologist  Sign Cosigned by:  Lucio Francisco MD at 7/29/2020  1:44 PM    Revision History     Date/Time  User  Provider Type  Action    7/29/2020  1:44 PM  Lucio Francisco MD  Physician  Cosign    7/29/2020  9:28 AM  Oneil Vargas  Exercise Physiologist  Sign      Revision History     Date/Time  User  Provider Type  Action    8/25/2020  9:01 PM  Lucio Francisco MD  Physician  Cosign    8/24/2020  9:30 AM  [place] [id number]  Exercise Physiologist  Sign      Cosigned by:  Lucio Francisco MD at 9/21/2020  1:45 PM    Revision History     Date/Time  User  Provider Type  Action    9/21/2020  1:45 PM  Lucio Francisco MD  Physician  Cosign    9/21/2020  9:45 AM  Oneil Vargas  Exercise Physiologist  Sign

## 2020-09-25 NOTE — PROGRESS NOTES
Arnel SÁNCHEZ.:  1945  Acct Number: [de-identified]  MRN:  [id number]                  [facility] COOKING SCHOOL WORKSHOP             Date: 2020        Session # ________   [facility] class covered:      () Adding Flavor     () Fast Breakfasts     () Salads and Dressings     () Soups and Sauces     () Simple Sides     () Appetizers and Snacks     () Delicious Desserts     () Plant Based Proteins     () Fast Evening Meals     (X) Weekend Breakfasts     () Efficiency Cooking     () Meat Alternatives     Patients were shown how to choose, prep, and cook; substitutions and other options were given. Samples were offered. Recipes were given and questions answered. The patient above was in the [employer] for 50 minutes.       Electronically signed by Danielito DANIELS [id number] on 2020 at 10:58 AM

## 2020-09-28 ENCOUNTER — OFFICE VISIT (OUTPATIENT)
Dept: CARDIOLOGY CLINIC | Age: 75
End: 2020-09-28
Payer: MEDICARE

## 2020-09-28 VITALS
BODY MASS INDEX: 29.45 KG/M2 | HEART RATE: 62 BPM | WEIGHT: 150 LBS | SYSTOLIC BLOOD PRESSURE: 122 MMHG | DIASTOLIC BLOOD PRESSURE: 74 MMHG | HEIGHT: 60 IN

## 2020-09-28 PROCEDURE — 99213 OFFICE O/P EST LOW 20 MIN: CPT | Performed by: INTERNAL MEDICINE

## 2021-03-18 ENCOUNTER — OFFICE VISIT (OUTPATIENT)
Dept: CARDIOLOGY CLINIC | Age: 76
End: 2021-03-18
Payer: MEDICARE

## 2021-03-18 VITALS
HEIGHT: 60 IN | SYSTOLIC BLOOD PRESSURE: 130 MMHG | DIASTOLIC BLOOD PRESSURE: 72 MMHG | WEIGHT: 137 LBS | BODY MASS INDEX: 26.9 KG/M2 | HEART RATE: 68 BPM

## 2021-03-18 DIAGNOSIS — Z95.820 S/P PERCUTANEOUS TRANSLUMINAL ANGIOPLASTY (PTA) WITH STENT PLACEMENT: ICD-10-CM

## 2021-03-18 DIAGNOSIS — I25.10 CORONARY ARTERY DISEASE INVOLVING NATIVE CORONARY ARTERY OF NATIVE HEART WITHOUT ANGINA PECTORIS: Primary | ICD-10-CM

## 2021-03-18 PROCEDURE — 99213 OFFICE O/P EST LOW 20 MIN: CPT | Performed by: INTERNAL MEDICINE

## 2021-03-18 NOTE — PROGRESS NOTES
[address] [facility] [address]  [place] [address]  Dept: [phone]  Dept Fax: [phone]  Loc: [phone]    Visit Date: 3/18/2021    Ms. Divine Beard is a 70 y.o. female  who presented for:  Chief Complaint   Patient presents with    6 Month Follow-Up       HPI:   75F hx of inferior STEMI 6/13/20 s/p PCI LAD & RCA, HTN, HLD, GERD, hypothyroidism who presents for 6 mo f/u post STEMI. Patient reports that she has made aggressive lifestyle changes and reports 30 Ib weight loss and exercises 5x wk. Reports atypical presentation w/o angina but having cold sweats and \"not feeling well\" during initial presentation and concerned about recurring undiagnosed sx. Had long discussion w/ pt regarding use of NG and when to present to ED for evaluation. Patient compliant w/ DAPT. Denies melena, hematochezia and had recent EGD < 1yr ago. Patient reports in the last year she has had no MATHEW, angina, leg swelling palpitations, or repeat of previous sx. Current Outpatient Medications:     nitroGLYCERIN (NITROSTAT) 0.4 MG SL tablet, up to max of 3 total doses.  If no relief after 1 dose, call 911., Disp: 25 tablet, Rfl: 3    atorvastatin (LIPITOR) 80 MG tablet, Take 1 tablet by mouth nightly, Disp: 90 tablet, Rfl: 3    metoprolol tartrate (LOPRESSOR) 25 MG tablet, Take 1 tablet by mouth 2 times daily, Disp: 180 tablet, Rfl: 3    ticagrelor (BRILINTA) 90 MG TABS tablet, Take 1 tablet by mouth 2 times daily, Disp: 180 tablet, Rfl: 3    pantoprazole (PROTONIX) 40 MG tablet, Take 1 tablet by mouth 2 times daily (before meals), Disp: 180 tablet, Rfl: 3    venlafaxine (EFFEXOR XR) 150 MG extended release capsule, Take 150 mg by mouth daily, Disp: , Rfl:     aspirin EC 81 MG EC tablet, Take 81 mg by mouth daily, Disp: , Rfl:     levothyroxine (SYNTHROID) 75 MCG tablet, Take 75 mcg by mouth Daily, Disp: , Rfl:     Past Medical History  Marycruz Prasadchacha  has a past medical history of CAD (coronary artery disease). Social History  Jeanice Epley  reports that she has never smoked. She has never used smokeless tobacco. She reports previous alcohol use. She reports that she does not use drugs. Family History  Rufina family history is not on file. There is no family history of bicuspid aortic valve, aneurysms, heart transplant, pacemakers, defibrillators, or sudden cardiac death. Past Surgical History   Past Surgical History:   Procedure Laterality Date    DIAGNOSTIC CARDIAC CATH LAB PROCEDURE      PTCA      UPPER GASTROINTESTINAL ENDOSCOPY N/A 6/16/2020    EGD ESOPHAGOGASTRODUODENOSCOPY performed by Mariama Navarro MD at [facility]       Review of Systems   Constitutional: Negative for chills and fever  HENT: Negative for congestion, sinus pressure, sneezing and sore throat. Eyes: Negative for pain, discharge, redness and itching. Respiratory: Negative for apnea, cough  Gastrointestinal: Negative for blood in stool, constipation, diarrhea   Endocrine: Negative for cold intolerance, heat intolerance, polydipsia. Genitourinary: Negative for dysuria, enuresis, flank pain and hematuria. Musculoskeletal: Negative for arthralgias, joint swelling and neck pain. Neurological: Negative for numbness and headaches. Psychiatric/Behavioral: Negative for agitation, confusion, decreased concentration and dysphoric mood. Objective:     /72   Pulse 68   Ht 5' (1.524 m)   Wt 137 lb (62.1 kg)   BMI 26.76 kg/m²     Wt Readings from Last 3 Encounters:   03/18/21 137 lb (62.1 kg)   09/28/20 150 lb (68 kg)   06/30/20 165 lb 3.2 oz (74.9 kg)     BP Readings from Last 3 Encounters:   03/18/21 130/72   09/28/20 122/74   06/30/20 112/62       Nursing note and vitals reviewed. Physical Exam   Constitutional: Elderly white female oriented to person, place, and time. Appears well-developed and well-nourished. HENT:   Head: Normocephalic and atraumatic.    Eyes: EOM are normal. Pupils are equal, round, and reactive to light. Neck: Normal range of motion. Neck supple. No JVD present. Cardiovascular: Normal rate, regular rhythm, normal heart sounds and intact distal pulses. No murmur heard. Pulmonary/Chest: Effort normal and breath sounds normal. No respiratory distress. No wheezes. No rales. Abdominal: Soft. Bowel sounds are normal. No distension. There is no tenderness. Musculoskeletal: Normal range of motion. No edema. Neurological: Alert and oriented to person, place, and time. No cranial nerve deficit. Coordination normal.   Skin: Skin is warm and dry. Psychiatric: Normal mood and affect.        No results found for: CKTOTAL, CKMB, CKMBINDEX    Lab Results   Component Value Date    WBC 5.1 08/03/2020    RBC 4.54 08/03/2020    HGB 13.4 08/03/2020    HCT 40.9 08/03/2020    MCV 90.0 08/03/2020    MCH 29.4 08/03/2020    MCHC 32.7 08/03/2020    RDW 16.1 08/03/2020     08/03/2020    MPV 10.3 06/19/2020       Lab Results   Component Value Date     06/19/2020    K 4.1 06/19/2020     06/19/2020    CO2 21 06/19/2020    BUN 11 06/19/2020    LABALBU 4.2 08/03/2020    CREATININE 0.7 06/19/2020    CALCIUM 8.8 06/19/2020    LABGLOM 82 06/19/2020    GLUCOSE 108 06/19/2020       Lab Results   Component Value Date    ALKPHOS 106 08/03/2020    ALT 32 08/03/2020    AST 26 08/03/2020    PROT 6.7 08/03/2020    BILITOT 0.5 08/03/2020    BILIDIR 0.1 08/03/2020    LABALBU 4.2 08/03/2020       Lab Results   Component Value Date    MG 2.1 06/17/2020       Lab Results   Component Value Date    INR 1.14 (H) 06/19/2020    INR 1.07 06/16/2020    INR 0.94 09/06/2019         Lab Results   Component Value Date    LABA1C 5.6 06/14/2020       Lab Results   Component Value Date    TRIG 87 06/14/2020    HDL 31 06/14/2020    LDLCALC 71 06/14/2020       No results found for: TSH      Testing Reviewed:      I have individually reviewed the cardiac test below:    ECHO:   Results for orders placed during the hospital encounter of 06/13/20   ECHO Complete 2D W Doppler W Color    Narrative Transthoracic Echocardiography Report (TTE)     Demographics      Patient Name     Samir Valero Gender                Female      MR #             [id number]      Race                                                        Ethnicity      Account #        [de-identified]      Room Number           0025      Accession Number [id number]     Date of Study         06/15/2020      Date of Birth    1945     Referring Physician   Tierney Benjamin MD      Age              69 year(s)     Salvador J[facility]                                      Interpreting          Cindy Woods MD                                   Physician     Procedure    Type of Study      TTE procedure:ECHOCARDIOGRAM COMPLETE 2D W DOPPLER W COLOR. Procedure Date  Date: 06/15/2020 Start: 08:30 AM    Study Location: Bedside  Technical Quality: Adequate visualization    Indications:STEMI. Additional Medical History:Coronary artery disease    Patient Status: Routine    Height: 60 inches Weight: 164 pounds BSA: 1.72 m^2 BMI: 32.03 kg/m^2    BP: 101/65 mmHg    Allergies    - No Known Allergies. Conclusions      Summary   Left ventricle size is normal.   Normal left ventricular wall thickness. There were regional wall motion abnormalities. Moderately hypokinetic motion of the lateral wall noted in the left   ventricle. Low Normal LV Systolic function. Ejection fraction is visually estimated at 50%. Doppler parameters were consistent with abnormal left ventricular   relaxation (grade 1 diastolic dysfunction). The left atrium is Mildly dilated.       Signature      ----------------------------------------------------------------   Electronically signed by Cindy Woods MD (Interpreting   physician) on 06/15/2020 at 07:24 PM normal limits. -IVC size is within normal limits with normal respiratory phasic changes. M-Mode/2D Measurements & Calculations      LV Diastolic   LV Systolic Dimension:    AV Cusp Separation: 1.5 cmLA   Dimension: 3.7 2.6 cm                    Dimension: 2.6 cmAO Root   cm             LV Volume Diastolic: 51.7 Dimension: 2.2 cmLA Area: 17.2   LV FS:29.7 %   ml                        cm^2   LV PW          LV Volume Systolic: 08.8   Diastolic: 0.9 ml   cm             LV EDV/LV EDV Index: 58.1   Septum         ml/34 m^2LV ESV/LV ESV    RV Diastolic Dimension: 1.6 cm   Diastolic: 1.1 Index: 14.9 ml/14 m^2   cm             EF Calculated: 57.7 %     LA/Aorta: 1.18                                               LA volume/Index: 46.7 ml /27m^2     Doppler Measurements & Calculations      MV Peak E-Wave: 92.1 cm/s  AV Peak Velocity: 162 LVOT Peak Velocity: 118   MV Peak A-Wave: 118 cm/s   cm/s                  cm/s   MV E/A Ratio: 0.78         AV Peak Gradient:     LVOT Peak Gradient: 6   MV Peak Gradient: 3.39     10.5 mmHg             mmHg   mmHg                                                    TV Peak E-Wave: 65.1 cm/s   MV Deceleration Time: 218                        TV Peak A-Wave: 45.4 cm/s   msec                              IVRT: 63 msec         TV Peak Gradient: 1.7                                                    mmHg   MV E' Septal Velocity: 6.9                       TR Velocity:236 cm/s   cm/s                       AV DVI (Vmax):0.73    TR Gradient:22.28 mmHg   MV A' Septal Velocity:                           PV Peak Velocity: 87 cm/s   11.6 cm/s                                        PV Peak Gradient: 3.03   MV E' Lateral Velocity: 5                        mmHg   cm/s   MV A' Lateral Velocity:   11.3 cm/s   E/E' septal: 13.35   E/E' lateral: 18.42   MR Velocity: 288 cm/s     [email or web address] [id number]        Assessment/Plan   Inferior STEMI, 6/2020  -DAVIS x 1 LCX  -DAVIS x1 LAD  -DAVIS x 3 RCA (overlapping)  Preserved EF  GERD  No symptoms, LDL 70s, continue DAPT, no bleeding. She lost 30 lbs. Continue cardiac rehab. She is working on more diet and exercise. Discussed lifestyle modifications including diet, exercise and weight loss. Will continue present medical therapy. F/u in 6 months    Disposition:  1 year    Electronically signed by Jaz River DO   3/18/2021 at 10:33 AM EDT    Attending Supervising Physician's Attestation Statement  I performed a history and physical examination on the patient and discussed the management with the resident physician. I reviewed and agree with the findings and plan as documented in the resident's note.     Electronically signed by Maria Del Carmen Grant MD on 3/19/21 at 7:26 AM EDT

## 2021-03-19 RX ORDER — PANTOPRAZOLE SODIUM 40 MG/1
40 TABLET, DELAYED RELEASE ORAL
Qty: 180 TABLET | Refills: 3 | Status: SHIPPED | OUTPATIENT
Start: 2021-03-19 | End: 2022-04-12

## 2021-03-19 RX ORDER — ATORVASTATIN CALCIUM 80 MG/1
80 TABLET, FILM COATED ORAL NIGHTLY
Qty: 90 TABLET | Refills: 3 | Status: SHIPPED | OUTPATIENT
Start: 2021-03-19 | End: 2022-05-11

## 2021-07-22 ENCOUNTER — HOSPITAL ENCOUNTER (OUTPATIENT)
Dept: INTERVENTIONAL RADIOLOGY/VASCULAR | Age: 76
Discharge: HOME | End: 2021-07-22

## 2021-07-22 DIAGNOSIS — Z13.6 SCREENING FOR ISCHEMIC HEART DISEASE: ICD-10-CM

## 2021-07-22 DIAGNOSIS — E78.5 HYPERLIPIDEMIA, UNSPECIFIED HYPERLIPIDEMIA TYPE: ICD-10-CM

## 2021-07-22 DIAGNOSIS — I10 ESSENTIAL (PRIMARY) HYPERTENSION: ICD-10-CM

## 2021-07-22 DIAGNOSIS — E03.9 HYPOTHYROIDISM, UNSPECIFIED TYPE: ICD-10-CM

## 2021-07-22 DIAGNOSIS — R94.5 ABNORMAL RESULTS OF LIVER FUNCTION STUDIES: ICD-10-CM

## 2021-07-22 PROCEDURE — 9900000021 US VASCULAR SCREENING

## 2021-09-02 ENCOUNTER — TELEPHONE (OUTPATIENT)
Dept: CARDIOLOGY CLINIC | Age: 76
End: 2021-09-02

## 2021-09-02 NOTE — TELEPHONE ENCOUNTER
Colleen Asher states that pt lmovm stating that her dr said she had a stroke, and wants to be seen by dr Tommy Rahman. I called pt back to get more information and had to lmovm. Pt should be seeing catracho if stroke related.

## 2021-09-07 ENCOUNTER — OFFICE VISIT (OUTPATIENT)
Dept: CARDIOLOGY CLINIC | Age: 76
End: 2021-09-07
Payer: MEDICARE

## 2021-09-07 VITALS
BODY MASS INDEX: 26.03 KG/M2 | WEIGHT: 132.6 LBS | SYSTOLIC BLOOD PRESSURE: 127 MMHG | HEIGHT: 60 IN | DIASTOLIC BLOOD PRESSURE: 80 MMHG | HEART RATE: 57 BPM

## 2021-09-07 DIAGNOSIS — I25.10 CORONARY ARTERY DISEASE INVOLVING NATIVE CORONARY ARTERY OF NATIVE HEART WITHOUT ANGINA PECTORIS: Primary | ICD-10-CM

## 2021-09-07 DIAGNOSIS — E78.5 DYSLIPIDEMIA: ICD-10-CM

## 2021-09-07 DIAGNOSIS — I10 ESSENTIAL HYPERTENSION: ICD-10-CM

## 2021-09-07 DIAGNOSIS — R42 VERTIGO: ICD-10-CM

## 2021-09-07 PROCEDURE — 99213 OFFICE O/P EST LOW 20 MIN: CPT | Performed by: NURSE PRACTITIONER

## 2021-09-07 NOTE — PROGRESS NOTES
Patient presents in office today to discuss circulatory issues. Denies CP, sob, JODI, palpitations. C/o leg cramps, light headedness, dizziness.

## 2021-09-07 NOTE — PATIENT INSTRUCTIONS
Continue current medications. Continue exercise regimen. Obtain the MRI of the brain as ordered. Continue to be cognizant of taking time to change positions. Call if your symptoms return.

## 2021-09-07 NOTE — PROGRESS NOTES
[facility] PROFESSIONAL SERVICES  HEART SPECIALISTS [facility]   [address]   [address]   [place] [address]   Dept: [phone]   Dept Fax: [phone]: [phone]      Chief Complaint   Patient presents with    Follow-up     verbereniceo        Cardiologist:  Dr. North Garcia    Patient called office on 9/2/21 requesting appt stating was informed she had a stroke. Patient presents for evaluation regarding possible stroke. Reports she had an episode vertigo in middle of night ~ 3 weeks ago - none since. States she got up out of bed quickly due to urgency associated with need to void. Noticed balance to be a \"little off and room seemed to spin as she sat in bathroom\". Noted spinning of room continued for brief time once she got back into bed. No associated sx of chest discomfort, dyspnea, palpitations. Did not pass out or have feelings as if was going to pass out. \"just very odd\". No N/T, no extremity weakness, no visual or speech afflictions. She had routine follow-up with her chiropractor shortly after and mentioned event in passing. He recommended seeing PT for vestibular evaluation. PT evaluation 1 week later and PT refused to perform evaluation 2/2 to concerns she may have had a stroke. Advised additional evaluation. She was last seen in the office on 3/18/21 per  for 6 month follow-up. Hx of inferior STEMI 6/13/20 s/p PCI LAD & RCA, HTN, HLD, GERD, hypothyroidism. Per Dr. Burnett Staff office note: She had made aggressive lifestyle changes and reported 30 Ib weight loss and exercising 5x wk. Reported atypical presentation w/o angina but having cold sweats and \"not feeling well\" during initial presentation and concerned about recurring undiagnosed sx. Had long discussion w/ pt regarding use of NG and when to present to ED for evaluation. Patient compliant w/ DAPT. Denied melena, hematochezia and had recent EGD < 1yr ago.  Patient reports in the last year she had had no MATHEW, angina, leg swelling palpitations, or repeat of previous sx. General:  Appears well. Bright, pleasant. No fever, no chills, No    fatigue or weight loss. Continues with exercise program at    [employer] ([place], rowing machine) and no sx while    exercising. Pulmonary:    No dyspnea, no wheezing  Cardiac:    Denies recent chest pain or palpitations  GI:     No nausea or vomiting, no abdominal pain  Neuro:     No dizziness or light headedness since episode 3 weeks    ago  Musculoskeletal:  No recent active issues. Intermittent right calf \"dipti    horses\" since tendon release surgery. Extremities:   No edema, good peripheral pulses      Past Medical History:   Diagnosis Date    CAD (coronary artery disease)     Vertigo        No Known Allergies    Current Outpatient Medications   Medication Sig Dispense Refill    atorvastatin (LIPITOR) 80 MG tablet Take 1 tablet by mouth nightly 90 tablet 3    metoprolol tartrate (LOPRESSOR) 25 MG tablet Take 1 tablet by mouth 2 times daily 180 tablet 3    pantoprazole (PROTONIX) 40 MG tablet Take 1 tablet by mouth 2 times daily (before meals) 180 tablet 3    ticagrelor (BRILINTA) 90 MG TABS tablet Take 1 tablet by mouth 2 times daily 180 tablet 3    nitroGLYCERIN (NITROSTAT) 0.4 MG SL tablet up to max of 3 total doses. If no relief after 1 dose, call 911. 25 tablet 3    venlafaxine (EFFEXOR XR) 150 MG extended release capsule Take 150 mg by mouth daily      aspirin EC 81 MG EC tablet Take 81 mg by mouth daily      levothyroxine (SYNTHROID) 75 MCG tablet Take 75 mcg by mouth Daily       No current facility-administered medications for this visit.        Social History     Socioeconomic History    Marital status:      Spouse name: None    Number of children: None    Years of education: None    Highest education level: None   Occupational History    None   Tobacco Use    Smoking status: Never Smoker    Smokeless tobacco: Never Used   Vaping Use    Vaping Use: Never used   Substance and Sexual Activity    Alcohol use: Not Currently    Drug use: Never    Sexual activity: None   Other Topics Concern    None   Social History Narrative    None     Social Determinants of Health     Financial Resource Strain:     Difficulty of Paying Living Expenses:    Food Insecurity:     Worried About Running Out of Food in the Last Year:     Ran Out of Food in the Last Year:    Transportation Needs:     Lack of Transportation (Medical):  Lack of Transportation (Non-Medical):    Physical Activity:     Days of Exercise per Week:     Minutes of Exercise per Session:    Stress:     Feeling of Stress :    Social Connections:     Frequency of Communication with Friends and Family:     Frequency of Social Gatherings with Friends and Family:     Attends [employer] Services:     Active Member of Clubs or Organizations:     Attends Club or Organization Meetings:     Marital Status:    Intimate Partner Violence:     Fear of Current or Ex-Partner:     Emotionally Abused:     Physically Abused:     Sexually Abused:        History reviewed. No pertinent family history. Blood pressure 127/80, pulse 57, height 5' (1.524 m), weight 132 lb 9.6 oz (60.1 kg). General:   Well developed, well nourished, appears well. Lungs:   Clear to auscultation with good aeration. Heart:    Normal S1 S2, No murmur, rubs, or gallops  Abdomen:   Soft, non tender, no organomegalies, positive bowel sounds  Extremities:   No edema, no cyanosis, good peripheral pulses  Neurological:   Awake, alert, oriented. No obvious focal deficits. Verbalizes    with clear thought and articulation.     Musculoskeletal:  No obvious deformities      8/4/2020  7:02 AM - [facility]    Component Value Ref Range & Units Status Collected Lab   AST 26  15 - 41 IU/L Final 08/03/2020 10:49 AM [facility]   Alkaline Phosphatase 106  39 - 118 IU/L Final 08/03/2020 10:49 AM [facility]   Total of 16-JUN-2020 10:27,  Nonspecific T wave abnormality has improved in Lateral leads  Confirmed by Bolivar Blum ([id number]) on 6/19/2020 11:36:20 AM     6/13/20:  Transthoracic Echocardiography Report (TTE)      Demographics      Patient Name     Morteza Keenan Gender                Female      MR #             [id number]      Race                                                        Ethnicity      Account #        [de-identified]      Room Number           0025      Accession Number [id number]     Date of Study         06/15/2020      Date of Birth    1945     Referring Physician   Cecillia Linear MD Marylen Braun MD      Age              69 year(s)     Thad Craig, [facility]                                      Interpreting          Marcelo Zavala MD                                   Physician     Procedure     Type of Study      TTE procedure:ECHOCARDIOGRAM COMPLETE 2D W DOPPLER W COLOR. Procedure Date  Date: 06/15/2020 Start: 08:30 AM     Study Location: Bedside  Technical Quality: Adequate visualization     Indications:STEMI.     Additional Medical History:Coronary artery disease     Patient Status: Routine     Height: 60 inches Weight: 164 pounds BSA: 1.72 m^2 BMI: 32.03 kg/m^2     BP: 101/65 mmHg     Allergies    - No Known Allergies.      Conclusions      Summary   Left ventricle size is normal.   Normal left ventricular wall thickness. There were regional wall motion abnormalities. Moderately hypokinetic motion of the lateral wall noted in the left   ventricle. Low Normal LV Systolic function. Ejection fraction is visually estimated at 50%. Doppler parameters were consistent with abnormal left ventricular   relaxation (grade 1 diastolic dysfunction). The left atrium is Mildly dilated.       Signature      ----------------------------------------------------------------   Electronically signed by Marcelo Zavala MD within normal limits.   -The Pulmonary artery is within normal limits. -IVC size is within normal limits with normal respiratory phasic changes.      M-Mode/2D Measurements & Calculations      LV Diastolic   LV Systolic Dimension:    AV Cusp Separation: 1.5 cmLA   Dimension: 3.7 2.6 cm                    Dimension: 2.6 cmAO Root   cm             LV Volume Diastolic: 22.1 Dimension: 2.2 cmLA Area: 17.2   LV FS:29.7 %   ml                        cm^2   LV PW          LV Volume Systolic: 37.4   Diastolic: 0.9 ml   cm             LV EDV/LV EDV Index: 58.1   Septum         ml/34 m^2LV ESV/LV ESV    RV Diastolic Dimension: 1.6 cm   Diastolic: 1.1 Index: 45.9 ml/14 m^2   cm             EF Calculated: 57.7 %     LA/Aorta: 1.18                                               LA volume/Index: 46.7 ml /27m^2     Doppler Measurements & Calculations      MV Peak E-Wave: 92.1 cm/s  AV Peak Velocity: 162 LVOT Peak Velocity: 118   MV Peak A-Wave: 118 cm/s   cm/s                  cm/s   MV E/A Ratio: 0.78         AV Peak Gradient:     LVOT Peak Gradient: 6   MV Peak Gradient: 3.39     10.5 mmHg             mmHg   mmHg                                                    TV Peak E-Wave: 65.1 cm/s   MV Deceleration Time: 218                        TV Peak A-Wave: 45.4 cm/s   msec                              IVRT: 63 msec         TV Peak Gradient: 1.7                                                    mmHg   MV E' Septal Velocity: 6.9                       TR Velocity:236 cm/s   cm/s                       AV DVI (Vmax):0.73    TR Gradient:22.28 mmHg   MV A' Septal Velocity:                           PV Peak Velocity: 87 cm/s   11.6 cm/s                                        PV Peak Gradient: 3.03   MV E' Lateral Velocity: 5                        mmHg   cm/s   MV A' Lateral Velocity:   11.3 cm/s   E/E' septal: 13.35   E/E' lateral: 18.42   MR Velocity: 288 cm/s [id number]      20:  CARDIAC CATHETERIZATION     PATIENT NAME: Rach Artis                     :        1945  MED REC NO:   [id number]                           ROOM:       0025  ACCOUNT NO:   [de-identified]                           ADMIT DATE: 2020  PROVIDER:     Kong Mendoza MD     DATE OF PROCEDURE:  2020     CARDIAC CATHETERIZATION     INDICATIONS:  Severe multivessel CAD, status post STEMI presenting for a  complete revascularization in the setting of dyspnea on exertion.     DESCRIPTION OF PROCEDURE:  After informed consent was obtained from the  patient, she was brought to the cardiac catheterization laboratory and  prepped in a sterile fashion. Right radial artery was chosen as the  primary point of access. Preprocedure timeout was completed. After  infiltration of the right wrist with 2% lidocaine using micropuncture  and modified Seldinger technique under fluoroscopic guidance and  ultrasound guidance, I was able to insert a 6-[place] Slender sheath into  the right radial artery. Standard antispasmodic/antithrombotic cocktail  was given intraarterially. We then proceeded with intervention part. We had preprocedure angiography performed at the time of her  ST-elevation myocardial infarction which demonstrated severe LAD, 70% to  80% stenosis and severe RCA, 80% to 90% stenosis.     We elected to proceed with intervention of the LAD first and then  proceed with RCA intervention thereafter. I exchanged out and  cannulated the left main with a 6-[place] EBU 3.5 guiding catheter. Heparin IV was given. ACT was confirmed to be above 250 seconds. I  wired the lesion using a Runthrough wire. I direct stented the lesion  using a 2.5 x 38 Xience Kay ADVIS at 8 atmospheres.   I postdilated the  stent with a 2.5 x 15 NC balloon distally at 12 to 14 with GI  ulcerations. She is cleared for this procedure; however, she will need  to continue ulcer management per Gastroenterology as outpatient. She  will be prescribed PPI/sucralfate.     All the above was explained to the patient and patient's family. They  are agreeable and amenable to the plan.       Grayson Carr MD   D: 06/22/2020      Diagnosis Orders   1. Coronary artery disease involving native coronary artery of native heart without angina pectoris     2. Vertigo  MRI BRAIN WO CONTRAST   3. Essential hypertension     4. Dyslipidemia         Orders Placed This Encounter   Procedures    MRI BRAIN WO CONTRAST     Standing Status:   Future     Standing Expiration Date:   9/7/2022     Order Specific Question:   Reason for exam:     Answer:   new onset vertigo; possible TIA     Obtain MRI brain to assess for recent stroke; risk factors for CVD.     Continue Dr Peggy Mcdaniel current treatment plan:  CAD/PCI w stents: ASA, Brilintta, Metoprolol  HTN: BP well controlled  HLD - Lipitor 80 qHS - last labs 2020   - recommend check lipids & LFT   GERD - Protonix  Hypothyroidism - Synthroid  - no recent labs     Follow up with Dr Nidhi Burch in  months as scheduled or sooner if needed

## 2021-09-15 ENCOUNTER — HOSPITAL ENCOUNTER (OUTPATIENT)
Dept: MRI IMAGING | Age: 76
Discharge: HOME | End: 2021-09-15
Payer: MEDICARE

## 2021-09-15 DIAGNOSIS — R42 VERTIGO: ICD-10-CM

## 2021-09-15 PROCEDURE — 70551 MRI BRAIN STEM W/O DYE: CPT

## 2021-09-15 NOTE — PLAN OF CARE
within the next month and is contemplating different ways to go about doing this [] Pre Contemplation  [] Contemplation  [x] Preparation  [] Action  [] Maintenance  [] Relapse  Patient continues to express a desire to make behavior changes within the next month and is considering different ways to go about doing this [] Pre Contemplation  [] Contemplation  [] Preparation  [x] Action  [] Maintenance  [] Relapse  Patient has actively been making behavior changes that patient has been preparing for. EXERCISE ASSESSMENT EXERCISE ASSESSMENT EXERCISE ASSESSMENT EXERCISE ASSESSMENT   6 Min Walk Test  Distance walked:   0.13 miles  686 ft.  1.99 METs  Max HR:109 BPM      RPE:  12  THR:  112-126  Rhythm:  NSR   6 Min Walk Test  Distance walked:   0.31 miles  1636 ft  3.4 METs  Max HR:124 BPM      RPE:  12  %Change ft= 138%    Rhythm:  NSR   DASI: 4.6 METs DASI: 5.1 METs DASI: 5.6 METs DASI: 5.62 METs   Return to Work  [employer] on returning to work? 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 [] Yes    [x] No     Return to work:  Retired - but has returned to volunteering in the [employer]    Orthopedic Limitations/  [x] Yes    [] No  If yes please list:  Both hips replaced, and foot problems      Orthopedic Limitations  *If patient has orthopedic issue:   Actions/  accomodations needed to make [employer] successful : NA, Dea Abel is doing well  Orthopedic Limitations  No AD Orthopedic Limitations  NO AD   Fall Risk  Fall risk assessed? [x] Yes      [] No    Balance Issues?   [] Yes      [x] No     [] Walker [] Cane    [x] Safety issues reviewed      Fall Risk  *If patient is a fall risk, action needed to accommodate: NA Fall Risk  na Fall Risk   Home Exercise  [] Yes    [x] No   Home Exercise  [] Yes    [x] No   Home Exercise  [] Yes    [x] No  No aerobic Home Exercise  [x] Yes    [] No  No aerobic    Angina Progression: increase aerobic activity up to 15 min over next 4 weeks by increasing time 2-3 min/week. Progression: increase aerobic intensity as tolerated by the pt   Progression: Increase intensity 5-10% over the next 4 weeks as tolerated. Progression:  Increase time/intensity when RPE <13, and HR is in [facility]   [x] Yes      [] No  Upper and Lower body strength training 2x/wk    Wt: 2#       Reps:  8-15    *Increase wt. after completing 15 reps with an RPE of <12/13. [x] Yes      [] No  Upper and Lower body strength training 2x/wk    Wt: 3#       Reps:  8-15    *Increase wt. after completing 15 reps with an RPE of <12/13. [x] Yes      [] No  Upper and Lower body strength training 2x/wk    Wt: 4#       Reps:  8-15    *Increase wt. after completing 15 reps with an RPE of <12/13. Continue Strength Training at home   [x] Exercise Log & Strength training handout given     Wt: 4#       Reps:  8-15    *Increase wt. after completing 15 reps with an RPE of <12/13. Flexibility Flexibility Flexibility Flexibility   [x] Yes      [] No  25 min session of Core Strength & Flexibility 1x/per week  Attends Core Strength & Flexibility   [x] Yes      [] No Attends Core Strength & Flexibility   [x] Yes      [] No Continue Core Strength & Flexibility at home   Home Exercise  *Young Daily planning to start walking 2 days/week for 10 min on days not in rehab. Home Exercise  *Huong Wright verbalizes planning to start walking 2 days/week for 15 min on days not in rehab. Home Exercise  *Huong Wright verbalizes planning to walk/ bike 3-4 days/week for 30 min on days not in rehab.  Home Exercise  *Young Daily her plan to exercise at the [employer] 3 d /wk   *Education* *Education* *Education* *Education*   RPE Scale  [x] Yes      [] No  Exercise Safety  [x] Yes      [] No  Equipment Orientation  [x] Yes      [] No  S/S to Report  [x] Yes      [] No  Warm Up/Cool Down  [x] Yes      [] No  Home Exercise  [x] Yes      [] No   All Exercise Education Completed  [x] Yes      [] No   Exercise Education Recommended    Workshops  [x] Improving Performance  [x] Balance Training & Fall Prevention  [x] Exercise Biomechanics  [x] Resistance Training & Core Strength Exercise Education Attended/Date    improv performance - 7/20  Interactive discussion was completed with patient along with verbalizing comprehension  Exercise Education Attended/Date    8/10/20Balance Training & Fal  Pre and post discussion along with interactive discussion with patient verbalizing comprehension. All Sessions Completed? [x] Yes  [] No    Intro to yoga - 9/23/2020  Patient verbalizes and demonstrated comprehension. The [address] has yoga classes she is planning to look into. *Goals* *Goals* *Goals* *Goals*   Initial Exercise Goals Exercise Goals  Exercise Goals  Exercise Goals   Rufina plans to:  [x] Attend exercise sessions 3x/wk  [x] initiate home exercise 2-3x/wk for 10-20 min  [x] Increase 6 min walk distance by 10%  [x] Attend Exercise workshops Rufina plans to:  [x] Attend exercise sessions 3x/wk  [x] continue home exercise 2-3x/wk for 20-30 min  [x] Attend Exercise workshops Rufina's plans to:  [x] Attend exercise sessions 3x/wk  [x] continue home exercise 3-4x/wk for 30-45 min  [x] Determine plan of exercise following rehab  [x] Attend Exercise workshops Rufina achieved exercise goals?     [x]  Yes    [] No  [x] Increased 6 min walk distance by 10%  [x] Currently exercising 30-60 min/day, 5-7days/wk   [x] Plans to continue exercise on own  [x] Plans to join a local fitness center to continue exercise     Return to ADL or Hobbies:  Heidi Hall would like to improve strength and endurance so she does not become so short of breath as easily Return to ADL or Hobbies:  Heidi Hall would like to improve strength and endurance so she is able to return to walk longer distances without being so short of breath  Return to ADL or Hobbies:  Austen Campbell would like to improve strength and endurance so she is able to return to all previous activities Return to ADL or Hobbies:  Austen Campbell is able to do everything she wants to do. *MET level required for above goal:  4.0 METs MET level Achieved:  4. 0METs MET level Achieved:  3.6 METs MET level Achieved:  4.8METs     Individual Cardiac Treatment Plan - Nutrition  NUTRITION  ASSESSMENT/PLAN NUTRITION  REASSESSMENT NUTRITION   REASSESSMENT NUTRITION  DISCHARGE/FOLLOW-UP   Stages of Change Stages of Change Stages of Change Stages of Change   [] Pre Contemplation  [x] Contemplation  [] Preparation  [] Action  [] Maintenance  [] Relapse  Patient is aware that a change needs to be made, but has mixed feelings about making the change [] Pre Contemplation  [] Contemplation  [x] Preparation  [] Action  [] Maintenance  [] Relapse  Patient is beginning to express a desire to make a behavior change within the next month and is considering how to go about doing this [] Pre Contemplation  [] Contemplation  [] Preparation  [x] Action  [] Maintenance  [] Relapse  Patient has actively been making behavior changes that patient has been preparing for. [] Pre Contemplation  [] Contemplation  [] Preparation  [x] Action  [] Maintenance  [] Relapse  Patient continues to actively make behavior changes that patient has been learning    NUTRITION ASSESSMENT NUTRITION ASSESSMENT NUTRITION ASSESSMENT NUTRITION ASSESSMENT   Weight Management  Weight: 164.2        Height: 5'0\"   BMI: 32 Weight Management  Weight: 161.8                  Weight Management  Weight: 155.8                 Weight Management  Weight: 152.2                    BMI: 29.7   Eating Plan  Current eating habits: small portion size and low fat foods.   Patient is in contemplation and plans to take baby steps with picking a behavior change that will work   [address] to use less salt no red meat less sugar and more fruits and veggies to be added to her daily eating Eating Plan  Changes: Plans to add and eat more veggies & fruit , less meat, change in spices throughout her daily and weekly meals Eating Plan Improvements: Patient plans to continue decreasing less salt, fat, sugars, more fruits veggies and salads. Alcohol Use  [] none          [] daily  [] weekly      [x] special         Diet Assessment Tool:  RATE YOUR PLATE  *Given to patient to complete and return. Diet Assessment Tool:    Score: did not return /69      Diet Assessment Tool: RATE YOUR PLATE  Score: 90/41   NUTRITION PLAN NUTRITION PLAN NUTRITION PLAN NUTRITION PLAN   *Interventions* *Interventions* *Interventions* *Interventions*   Initial Survey given Goal Setting Discussion:   [x] Yes      [] No  Patient is verbalizing understanding and is making behavior changes. Patient has been making changes in eating plan. Follow Up Survey Reviewed & Goals Updated:  Patient verbalizes comprehension. Professional Referral  Please check if needed. [] Dietitian Consult   [] Wt. Management Referral  [] Other:  Professional Referral  Please check if needed. [] Dietitian Consult   [] Wt. Management Referral  [] Other: Professional Referral  Please check if needed. [] Dietitian Consult   [] Wt. Management Referral  [] Other: Professional Referral  Please check if needed. [] Dietitian Consult   [] Wt. Management Referral  [] Other:   *Education* *Education* *Education* *Education*   Nutritional Education Recommended    [x] 1:1 Registered Dietitian    Workshops  [x] Label Reading   [x] Menu  [x] Targeting Nutrition Priorities  [x] Fueling a Healthy Body   Nutritional Education Attended/Date    Fueling a healthy body - 7/8  Patient was interactive in discussion     Targeting nutrition prior - 7/29; There was pre and post discussion, patient verbalizes comprehension.     Nutritional Education Attended/Date    8/17/20 Label Reading; Patient was interactive in discussion and verbalized comprehension at end of workshop   All Sessions Completed? [x] Yes  [] No    Label reading - 8/17/2020; Patient completed practice label reading and verbalized comprehension. Menu - 9/9/2020  Patient was interactive in discussion and verbalized comprehension. Cooking School  Recommended     [x] Adding Flavor  [x] Fast & Healthy     Breakfasts  [x] Salads & Dressings  [x] Soups & Simple     Sauces  [x] Simple Sides  [x] Appetizers &     Snacks  [x] Delicious Desserts  [x] Plant Proteins  [x] Fast Evening Meals  [x] Weekend Breakfasts  [x] Cook once, Eat       twice  [x] Meat Alternatives Cooking School  Sessions Completed    Adding flavor - 7/24; Patient was interactive in discussion during cooking demonstration and verbalized comprehension. [facility] once eat twice - 7/10; Patient was interactive in discussion during cooking demonstration, liked some of the suggestions given and verbalized comprehension. Meat al - 7/17; Patient was interactive in discussion during cooking demonstration and verbalized comprehension. Cooking School  Sessions Completed    7/31/20   Fast & Healthy     Breakfasts; Patient was interactive in discussion during cooking demonstration and verbalized comprehension. 8/7/20   Salads & Dressings; Patient was interactive in discussion during cooking demonstration and verbalized comprehension. 8/14/20   Soups & Simple     Sauces; Patient was interactive in discussion during cooking demonstration and verbalized comprehension. 8/21/20 Simple Sides; Patient was interactive in discussion during cooking demonstration and verbalized comprehension. Cooking School    # of sessions completed:  12    Weekend break  - 9/25/2020;Patient was interactive in discussion during cooking demonstration and verbalized comprehension. Neal and snacks - 8/28/2020Patient was interactive in discussion during cooking demonstration and verbalized comprehension. ;     delic desserts - Behavioral Outcomes Behavioral Outcomes Behavioral Outcomes Behavioral Outcomes   Tool Used:  Magdalena Almeida, Quality of Life Index, Cardiac Version IV  *Given to patient to complete. Tool Used:    Magdalena Almeida, Quality of Life Index, Cardiac Version IV     QOL Index Score: 28.64  HF:28.62  S&E:30  P&S: 28.07  Family: 27.6  Tool Used:     Magdalena Almeida, Quality of Life Index, Cardiac Version IV    QOL Index Score: 29.76  HF:19.42  S&E:30  P&S: 30  Family: 30   PHQ-9 score 2  Depression Severity  [x]Minimal  []Mild   []Moderate  []Moderately Severe  []Severe   PHQ-9 score 1  Depression Severity  [x]Minimal  []Mild   []Moderate  []Moderately Severe []Severe   Does patient have Family Support? [x] Yes      [] No  No signs of marital/family distress      Within the Past Month:  *Have you wished you were dead or wished you could go to sleep and not wake up? [] Yes      [x] No  *Have you had any thoughts of killing yourself? [] Yes      [x] No        Using a scale of 0-10, 0=none, 10=very:   Rate your depression: 2  Rate your anxiety:  5  Using a scale of 0-10, 0=none, 10=very:   Rate your depression: 0  Rate your anxiety:  1 Using a scale of 0-10, 0=none, 10=very:   Rate your depression: 0  Rate your anxiety:  1 Using a scale of 0-10, 0=none, 10=very:   Rate your depression: 0  Rate your anxiety:  1   Signs and Symptoms of Depression Present? [] Yes      [x] No   Signs and Symptoms of Depression Present? [] Yes      [x] No Signs and Symptoms of Depression Present? [] Yes      [x] No   Signs and Symptoms of Depression Present? [] Yes      [x] No     Signs and Symptoms of Anxiety Present? [x] Yes      [] No  If yes, please explain:  Pt stated that she has panic attacks sometimes. Signs and Symptoms of Anxiety Present? [x] Yes      [] No  If yes, please explain:  Has a history of panic attacks Signs and Symptoms of Anxiety Present?     [] Yes      [x] No   Signs and Symptoms of Anxiety Present? [] Yes      [x] No     [] Patient has poor eye contact   [] Flat affect present. [] Signs of anxiety, anger or hostility    [] Signs social isolation present. []  Signs of alcohol or substance abuse      PSYCHOSOCIAL PLAN PSYCHOSOCIAL PLAN PSYCHOSOCIAL PLAN PSYCHOSOCIAL PLAN   *Interventions* *Interventions* *Interventions* *Interventions*   *Please check if needed  [] Psych Consult  [] Physician Referral  [] Stress Management Skills *Please check if needed  [] Psych Consult  [] Physician Referral  [x] Stress Management Skills  Patient is verbalizing understanding and comprehends the need to continue to practice skills demonstrated *Please check if needed  [] Psych Consult  [] Physician Referral  [x] Stress Management Skills  Patient is verbalizing understanding and comprehends the need to continue to practice skills demonstrated *Please check if needed  [] Psych Consult  [] Physician Referral  [x] Stress Management Skills  Patient is verbalizing understanding and comprehends the need to continue to practice skills demonstrated   Is patient currently taking anti-depressant or anti-anxiety medications? [x] Yes      [] No  If yes, please list medications:  venlafaxine Change in anti-depressant or anti-anxiety medications? [] Yes      [x] No   Change in anti-depressant or anti-anxiety medications? [] Yes      [x] No   Change in anti-depressant or anti-anxiety medications? [] Yes      [x] No     Psychosocial  Education Psychosocial Education Psychosocial Education Psychosocial Education   Healthy Mind-Set Workshops Recommended  [x] Stress & Health  [x] Taking Charge of Stress  [x] New Thoughts, New Behaviors  [x] Managing Moods & Relationships Healthy Mind-Set Workshops Attended/Date    Manage moods - 7/15; Interactive discussion and patient verbalized comprehension. Healthy Mind-Set Workshops Attended/Date  8/5/20 Stress & Health; patient was active in discussion and verbalized comprehension. Healthy Mind-Set Workshops  Completed  [x] Yes      [] No    New thoughts - 9/16/2020; patient was active in discussion, liked this workshop and verbalized comprehension. *Goals* *Goals* *Goals* *Goals*   Wing psychosocial goals are as follows:  Complete HADS & Magdalena & Juan Pablo, Quality of Life Index, Cardiac Version IV Wing psychosocial goals are as follows:  [x] Attend Healthy Mind-Set Workshops  [x] Reduce depression symptom severity by 1 level   Wing psychosocial goals are as follows:  [x] Attend Healthy Mind-Set Workshops  [x] Reduce depression symptom severity by 1 level  [] ** Rufina achieved psychosocial goals? [x] Yes    [] No    [x] Use methods learned to continue to reduce depression symptom severity by 1 level       Individual Cardiac Treatment Plan - Other:  Risk Factor/Education  RISK FACTOR  ASSESSMENT/PLAN RISK FACTOR  REASSESSMENT  RISK FACTOR  REASSESSMENT RISK FACTOR   DISCHARGE/FOLLOW-UP   Stages of Change Stages of Change Stages of Change Stages of Change   [] Pre Contemplation  [x] Contemplation  [] Preparation  [] Action  [] Maintenance  [] Relapse  Patient is aware that a change needs to be made, but has mixed feelings about making the change. [] Pre Contemplation  [] Contemplation  [x] Preparation  [] Action  [] Maintenance  [] Relapse  Patient is expressing a desire to make a behavior change within the next month and is considering how to go about doing this [] Pre Contemplation  [] Contemplation  [] Preparation  [x] Action  [] Maintenance  [] Relapse  Patient has actively been making behavior changes that patient has been preparing for. [] Pre Contemplation  [] Contemplation  [] Preparation  [x] Action  [] Maintenance  [] Relapse  Patient continues to actively make behavior changes and continues to make conscientious decisions regarding the behavior changes.      RISK FACTOR/EDUCATION ASSESSMENT RISK FACTOR/EDUCATION ASSESSMENT RISK FACTOR/EDUCATION ASSESSMENT RISK FACTOR /EDUCATION ASSESSMENT   Hypertension  [x] Yes      [] No    Resting BP: 98/74  Peak Ex BP:110/68  Medication: metoprolol   Hypertension  Resting BP: 116/64  Peak Ex BP:156/72  Medication Changes:  [] Yes      [x] No Hypertension  Resting BP: 118/66  Peak Ex BP:128/78  Medication Changes:  [] Yes      [x] No Hypertension  Resting BP: 120/60  Peak Ex BP:134/60  Medication Changes:  [] Yes      [x] No   Lipids  HLD/DLD  [x] Yes      [] No    Medication: atorvastatin Lipids  Medication Changes:  [] Yes      [x] No     Lipids  Medication Changes:  [] Yes      [x] No     Lipids  TOTAL CHOL: 119  HDL: 31  LDL:  71  TRI    Medication Changes:  [] Yes      [x] No   Diabetes  [] Yes      [x] No  FBS: 108           HbA1C: 5.6        Monitor BS @ home:   [] Yes      [x] No   Diabetes  NA   Diabetes  na     Diabetes         Tobacco Use  [] Current  [] Former  [x] Never   Tobacco Use  Change in smoking status   [] Yes      [x] No       Tobacco Use  Change in smoking status   [] Yes      [x] No       Tobacco Use  Change in smoking status   [] Yes      [x] No               Learning Barriers  Please select one:  [] Speech  [] Literacy  [] Hearing  [] Cognitive  [] Vision  [x] Ready to Learn Learning Barriers Addressed:   [x] Yes      [] No   Learning Barriers Addressed:   [x] Yes      [] No   Learning Barriers Addressed:  [x] Yes      [] No     RISK FACTOR/EDUCATION PLAN RISK FACTOR/EDUCATION PLAN RISK FACTOR/EDUCATION PLAN RISK FACTOR/EDUCATION PLAN   *Interventions* *Interventions* *Interventions* *Interventions*   Recommended Educational Videos    [x] Overview of The Pritikin Eating Plan  [x] Becoming A Pritikin   [x] Diseases of Our Time-Part 1  [x] Calorie Density  [x] Label Reading-Part 1  [x] Move It!   [x] Healthy Minds, Bodies, Hearts  [x] Dining Out-Part 1  [x] Heart Disease Risk Reduction  [x] Metabolic Syndrome & Belly Fat  [x] Facts on Fat  [x] Diseases of Our Times-Part 2  [x] Biology of Weight Control  [x] Biomechanical Limitations  [x] Nurtition Action Plan   Completed Videos/Date      7/2/2020  Overview of The [address]; patient verbalized this is going to be a process and not something is going to change over night. Disease p1 - 7/13; patient was interactive in pre and post discussion and verbalized comprehension. Calorie density - 7/22; patient was interactive in pre and post discussion and verbalized comprehension. Label read 1 - 7/27;patient was interactive in pre and post discussion and verbalized comprehension. Heart disease risk red - 7/6;patient was interactive in pre and post discussion and verbalized comprehension. Completed Videos/Date     8/3/20  Move It!;patient was interactive in pre and post discussion and verbalized comprehension. 8/19/20   Healthy Minds, Bodies, Hearts; patient was interactive in pre and post discussion and verbalized comprehension. Recommended Educational Videos Completed    [x] Yes      [] No    apps and snacks - 8/28/2020;patient was interactive in pre and post discussion and verbalized comprehension. delic desserts - 4/3/6735; patient was interactive in pre and post discussion and verbalized comprehension. Plant protein - 9/11/2020;patient was interactive in pre and post discussion, patient was appreciative of the different way to get protein in diet  and verbalized comprehension. Fast evening meals - 9/18/2020; patient was interactive in pre and post discussion and verbalized comprehension. Enhancing q of l - 8/26/2020; patient was interactive in pre and post discussion and verbalized comprehension. Ex action plan - 8/31/2020; patient was interactive in pre and post discussion and verbalized comprehension. Patient now plascencia farther away to walk in places    Disease part 2 - 9/21/2020; patient was interactive in pre and post discussion and verbalized comprehension.              Smoking Cessation/Relaspe Prevention Intervention needed? [] Yes      [x] No   Smoking Cessation/Relapse Prevention Scheduled? Not needed  na Smoking Cessation Counseling attended  [] Yes      [x] No  Not needed   Professional Referrals:  *Please check if needed  [] Diabetes Clinic  [] Lipid Clinic   [] Other:    Professional Referrals:  *Please check if needed  [] Diabetes Clinic  [] Lipid Clinic   [] Other:   Preventative Medication Preventative Medication Preventative Medication Preventative Medication   Aspirin  [x] Yes    [] No  Blood Thinner: Clopidogrel/Effient/Brillinta  [x] Yes    [] No  Beta Blocker  [x] Yes    [] No  Ace Inhibitor  [] Yes    [x] No  Statin/Lipid Lowering  [x] Yes    [] No Medication Changes? [] Yes    [x] No Medication Changes? [] Yes    [x] No Medication Changes? [] Yes    [x] No   *Education* *Education* *Education* *Education*   Does Rufina require any additional education? [] Yes    [x] No   Does Jeffry Gautamjeniffer require any additional education? [] Yes    [x] No Does Jeffry Schlatter require any additional education? [] Yes    [x] No Does Teran Schlatter require any additional education?   [] Yes    [x] No   Recommended Additional Educational Videos    Medical  [] Hypertension & Heart Disease  [] Decoding Lab Results  [] Aging Enhancing Your QoL  [] Sleep Disorders  Exercise  [] Body Composition  [] Improve Performance  [] Exercise Action Plan  [] Intro to Yoga  Behavioral  [] How Our Thoughts Can Heal Our Hearts  [] Smoking Cessation  Nutrition  [] Planning Your Eating Strategy  [] Lable Reading- Part 2  [] Dining Out - Part 2  [] Targeting Your Nutrition Priorities  [] Fueling a Healthy Body  [] Menu Workshop  [employer] [] Breakfast & Snacks  [] [employer] Salads & Dressing  [] [address] [place] [address]  [] Soups & Desserts   Additional Educational Videos Completed Additional Educational Videos Completed Additional Educational Videos Completed       *Goals* *Goals* *Goals* *Goals*   Rufina's risk factor/education goals are as follows:    [x] Optimal BP <140/90  [] Blood Sugar <120  [x] Attend recommended video education sessions  [x] Takes medications as prescribed 100% of the time   [] ** Rufina's risk factor/education goals are as follows:    [x] Optimal BP <140/90  [] Blood Sugar <120  [x] Attend recommended video education sessions  [x] Takes medications as prescribed 100% of the time    Rufina's risk factor/education goals are as follows:    [x] Optimal BP <140/90  [] Blood Sugar <120  [x] Attend recommended video education sessions  [x] Takes medications as prescribed 100% of the time   [] ** Rufina achieved risk factor goals? [x] Yes    [] No       Monitored telemetry has revealed NSR  [] documented arrhythmia at increasing workloads  [] associated symptoms  Monitored telemetry has revealed NSR  [] documented arrhythmia at increasing workloads  [] associated symptoms ** Monitored telemetry has revealed NSR   Monitored telemetry has revealed NSR  [] documented arrhythmia at increasing workloads  [] associated symptoms    Physician Response    [x] Cardiac rehab is reasonably and medically necessary for continuous cardiac monitoring surveillance  of patient's cardiac activity  [x] Initiate continuous telemerty monitoring and notify me with any concerns  [] Other   Physician Response    [x] Cardiac rehab is reasonably and medically necessary for continuous cardiac monitoring surveillance  of patient's cardiac activity  [x] Continue continuous telemerty monitoring and notify me with any concerns  [] Other     Physician Response    [x] Cardiac rehab is reasonably and medically necessary for continuous cardiac monitoring surveillance  of patient's cardiac activity  [x] Continue continuous telemerty monitoring and notify me with any concerns   [] Other     Physician Response  Patient discharged on 9-25-20 and she has made favorable progress.       Cosigned by:  Kev Byers MD at 7/6/2020  8:36 AM    Revision History     Date/Time  User  Provider Type  Action 7/6/2020  8:36 AM  Mirian Stephen MD  Physician  Cosign    7/2/2020  1:02 PM  Laurent Salas  Exercise Physiologist  Sign      Cosigned by:  Mirian Stephen MD at 7/29/2020  1:44 PM    Revision History     Date/Time  User  Provider Type  Action    7/29/2020  1:44 PM  Mirian Stephen MD  Physician  Cosign    7/29/2020  9:28 AM  Laurent Salas  Exercise Physiologist  Sign      Revision History     Date/Time  User  Provider Type  Action    8/25/2020  9:01 PM  Mirian Stephen MD  Physician  Cosign    8/24/2020  9:30 AM  [facility]  Exercise Physiologist  Sign      Cosigned by:  Mirian Stephen MD at 9/21/2020  1:45 PM    Revision History     Date/Time  User  Provider Type  Action    9/21/2020  1:45 PM  Mirian Stephen MD  Physician  Cosign    9/21/2020  9:45 AM  Laurent Salas  Exercise Physiologist  Sign

## 2021-09-17 ENCOUNTER — TELEPHONE (OUTPATIENT)
Dept: CARDIOLOGY CLINIC | Age: 76
End: 2021-09-17

## 2021-09-22 ENCOUNTER — HOSPITAL ENCOUNTER (OUTPATIENT)
Dept: MRI IMAGING | Age: 76
Discharge: HOME | End: 2021-09-22

## 2021-09-22 DIAGNOSIS — Z00.6 ENCOUNTER FOR EXAMINATION FOR NORMAL COMPARISON AND CONTROL IN CLINICAL RESEARCH PROGRAM: ICD-10-CM

## 2022-01-24 ENCOUNTER — OFFICE VISIT (OUTPATIENT)
Dept: CARDIOLOGY CLINIC | Age: 77
End: 2022-01-24
Payer: MEDICARE

## 2022-01-24 VITALS
DIASTOLIC BLOOD PRESSURE: 80 MMHG | BODY MASS INDEX: 28.43 KG/M2 | SYSTOLIC BLOOD PRESSURE: 128 MMHG | HEIGHT: 60 IN | WEIGHT: 144.8 LBS | HEART RATE: 66 BPM

## 2022-01-24 DIAGNOSIS — I75.023 BLUE TOE SYNDROME OF BOTH LOWER EXTREMITIES (HCC): ICD-10-CM

## 2022-01-24 DIAGNOSIS — M79.675 TOE PAIN, BILATERAL: Primary | ICD-10-CM

## 2022-01-24 DIAGNOSIS — I73.9 CLAUDICATION (HCC): ICD-10-CM

## 2022-01-24 DIAGNOSIS — I25.10 CORONARY ARTERY DISEASE INVOLVING NATIVE CORONARY ARTERY OF NATIVE HEART WITHOUT ANGINA PECTORIS: ICD-10-CM

## 2022-01-24 DIAGNOSIS — M79.674 TOE PAIN, BILATERAL: Primary | ICD-10-CM

## 2022-01-24 PROCEDURE — 93000 ELECTROCARDIOGRAM COMPLETE: CPT | Performed by: INTERNAL MEDICINE

## 2022-01-24 PROCEDURE — 99214 OFFICE O/P EST MOD 30 MIN: CPT | Performed by: INTERNAL MEDICINE

## 2022-01-24 RX ORDER — AMLODIPINE BESYLATE 5 MG/1
5 TABLET ORAL DAILY
Qty: 30 TABLET | Refills: 3 | Status: SHIPPED | OUTPATIENT
Start: 2022-01-24 | End: 2022-04-05 | Stop reason: SDUPTHER

## 2022-01-24 NOTE — PROGRESS NOTES
[id number] [facility] [address]  [facility] [address]  Dept: [phone]  Dept Fax: [phone]  Loc: [phone]    Visit Date: 1/24/2022    Ms. Clari Hoffman is a 78 y.o. female  who presented for:  Chief Complaint   Patient presents with    Follow-up       HPI:   HPI   74F hx of inferior STEMI 6/13/20 s/p PCI LAD & RCA, HTN, HLD, GERD, hypothyroidism who follows-up to discuss foot pain. She notes left foot redness, with dark purplish discoloration of the toes. 6 months. Skin peels. She notes toe pain. Feet are always cold. Duplex pending. She notes pain with walking, primarily in her toes. She notes primarily dark purple discoloration. No white toes. No hair on legs. No obvious gangrene. DAPT without bleeding. No new meds. No rashes. No joint pain. No joint swelling. Current Outpatient Medications:     atorvastatin (LIPITOR) 80 MG tablet, Take 1 tablet by mouth nightly, Disp: 90 tablet, Rfl: 3    metoprolol tartrate (LOPRESSOR) 25 MG tablet, Take 1 tablet by mouth 2 times daily, Disp: 180 tablet, Rfl: 3    pantoprazole (PROTONIX) 40 MG tablet, Take 1 tablet by mouth 2 times daily (before meals) (Patient taking differently: Take 40 mg by mouth daily ), Disp: 180 tablet, Rfl: 3    ticagrelor (BRILINTA) 90 MG TABS tablet, Take 1 tablet by mouth 2 times daily, Disp: 180 tablet, Rfl: 3    nitroGLYCERIN (NITROSTAT) 0.4 MG SL tablet, up to max of 3 total doses. If no relief after 1 dose, call 911., Disp: 25 tablet, Rfl: 3    venlafaxine (EFFEXOR XR) 150 MG extended release capsule, Take 150 mg by mouth daily, Disp: , Rfl:     aspirin EC 81 MG EC tablet, Take 81 mg by mouth daily, Disp: , Rfl:     levothyroxine (SYNTHROID) 75 MCG tablet, Take 75 mcg by mouth Daily, Disp: , Rfl:     Past Medical History  [facility]  has a past medical history of CAD (coronary artery disease) and Vertigo.     Social History  Matheus Albrecht  reports that she has never smoked. She has never used smokeless tobacco. She reports previous alcohol use. She reports that she does not use drugs. Family History  Rufina family history is not on file. There is no family history of bicuspid aortic valve, aneurysms, heart transplant, pacemakers, defibrillators, or sudden cardiac death. Past Surgical History   Past Surgical History:   Procedure Laterality Date    DIAGNOSTIC CARDIAC CATH LAB PROCEDURE      PTCA      UPPER GASTROINTESTINAL ENDOSCOPY N/A 6/16/2020    EGD ESOPHAGOGASTRODUODENOSCOPY performed by Jagdish Ovalle MD at [facility]       Review of Systems   Constitutional: Negative for chills and fever  HENT: Negative for congestion, sinus pressure, sneezing and sore throat. Eyes: Negative for pain, discharge, redness and itching. Respiratory: Negative for apnea, cough  Gastrointestinal: Negative for blood in stool, constipation, diarrhea   Endocrine: Negative for cold intolerance, heat intolerance, polydipsia. Genitourinary: Negative for dysuria, enuresis, flank pain and hematuria. Musculoskeletal: Negative for arthralgias, joint swelling and neck pain. Neurological: Negative for numbness and headaches. Psychiatric/Behavioral: Negative for agitation, confusion, decreased concentration and dysphoric mood. Objective:     /80   Pulse 66   Ht 5' (1.524 m)   Wt 144 lb 12.8 oz (65.7 kg)   BMI 28.28 kg/m²     Wt Readings from Last 3 Encounters:   01/24/22 144 lb 12.8 oz (65.7 kg)   09/07/21 132 lb 9.6 oz (60.1 kg)   03/18/21 137 lb (62.1 kg)     BP Readings from Last 3 Encounters:   01/24/22 128/80   09/07/21 127/80   03/18/21 130/72       Nursing note and vitals reviewed. Physical Exam   Constitutional: Oriented to person, place, and time. Appears well-developed and well-nourished. HENT:   Head: Normocephalic and atraumatic. Eyes: EOM are normal. Pupils are equal, round, and reactive to light. Neck: Normal range of motion.  Neck supple. No JVD present. Cardiovascular: Normal rate, regular rhythm, normal heart sounds and intact distal pulses. No murmur heard. Pulmonary/Chest: Effort normal and breath sounds normal. No respiratory distress. No wheezes. No rales. Abdominal: Soft. Bowel sounds are normal. No distension. There is no tenderness. Musculoskeletal: Normal range of motion. No edema. Neurological: Alert and oriented to person, place, and time. No cranial nerve deficit. Coordination normal.   Skin: Skin is warm and dry. Erythema and purplish discoloration of the toes. Toes are peeling. Primarily on the soles. Psychiatric: Normal mood and affect.        No results found for: CKTOTAL, CKMB, CKMBINDEX    Lab Results   Component Value Date    WBC 5.1 08/03/2020    RBC 4.54 08/03/2020    HGB 13.4 08/03/2020    HCT 40.9 08/03/2020    MCV 90.0 08/03/2020    MCH 29.4 08/03/2020    MCHC 32.7 08/03/2020    RDW 16.1 08/03/2020     08/03/2020    MPV 10.3 06/19/2020       Lab Results   Component Value Date     06/19/2020    K 4.1 06/19/2020     06/19/2020    CO2 21 06/19/2020    BUN 11 06/19/2020    LABALBU 4.2 08/03/2020    CREATININE 0.7 06/19/2020    CALCIUM 8.8 06/19/2020    LABGLOM 82 06/19/2020    GLUCOSE 108 06/19/2020       Lab Results   Component Value Date    ALKPHOS 106 08/03/2020    ALT 32 08/03/2020    AST 26 08/03/2020    PROT 6.7 08/03/2020    BILITOT 0.5 08/03/2020    BILIDIR 0.1 08/03/2020    LABALBU 4.2 08/03/2020       Lab Results   Component Value Date    MG 2.1 06/17/2020       Lab Results   Component Value Date    INR 1.14 (H) 06/19/2020    INR 1.07 06/16/2020    INR 0.94 09/06/2019         Lab Results   Component Value Date    LABA1C 5.6 06/14/2020       Lab Results   Component Value Date    TRIG 87 06/14/2020    HDL 31 06/14/2020    LDLCALC 71 06/14/2020       No results found for: TSH      Testing Reviewed:      I have individually reviewed the cardiac test below:    ECHO: Results for orders placed during the hospital encounter of 06/13/20    ECHO Complete 2D W Doppler W Color    Narrative  Transthoracic Echocardiography Report (TTE)    Demographics    Patient Name     Aarti Keys Gender                Female    MR #             [id number]      Race                      Ethnicity    Account #        [de-identified]      Room Number           0025    Accession Number [id number]     Date of Study         06/15/2020    Date of Birth    1945     Referring Physician   Melisas Vegas MD    Age              69 year(s)     Darryle Ku, RDCS    Interpreting          Jaki Hahn MD  Physician    Procedure    Type of Study    TTE procedure:ECHOCARDIOGRAM COMPLETE 2D W DOPPLER W COLOR. Procedure Date  Date: 06/15/2020 Start: 08:30 AM    Study Location: Bedside  Technical Quality: Adequate visualization    Indications:STEMI. Additional Medical History:Coronary artery disease    Patient Status: Routine    Height: 60 inches Weight: 164 pounds BSA: 1.72 m^2 BMI: 32.03 kg/m^2    BP: 101/65 mmHg    Allergies  - No Known Allergies. Conclusions    Summary  Left ventricle size is normal.  Normal left ventricular wall thickness. There were regional wall motion abnormalities. Moderately hypokinetic motion of the lateral wall noted in the left  ventricle. Low Normal LV Systolic function. Ejection fraction is visually estimated at 50%. Doppler parameters were consistent with abnormal left ventricular  relaxation (grade 1 diastolic dysfunction). The left atrium is Mildly dilated. Signature    ----------------------------------------------------------------  Electronically signed by Jaki Hahn MD (Interpreting  physician) on 06/15/2020 at 07:24 PM  ----------------------------------------------------------------    Findings    Mitral Valve  The mitral valve structure was normal with normal leaflet separation.   DOPPLER: The transmitral velocity was within the normal range with no  evidence for mitral stenosis. Mild mitral regurgitation is present. Aortic Valve  The aortic valve was trileaflet with normal thickness and cuspal  separation. DOPPLER: Transaortic velocity was within the normal range with  no evidence of aortic stenosis. There was no evidence of aortic  regurgitation. Tricuspid Valve  The tricuspid valve structure was normal with normal leaflet separation. DOPPLER: There was no evidence of tricuspid stenosis. There was no  evidence of tricuspid regurgitation. Pulmonic Valve  The pulmonic valve leaflets exhibited normal thickness, no calcification,  and normal cuspal separation. DOPPLER: The transpulmonic velocity was  within the normal range with no evidence for regurgitation. Left Atrium  The left atrium is Mildly dilated. Left Ventricle  Left ventricle size is normal.  Normal left ventricular wall thickness. There were regional wall motion abnormalities. Moderately hypokinetic motion of the lateral wall noted in the left  ventricle. Low Normal LV Systolic function. Ejection fraction is visually estimated at 50%. Doppler parameters were consistent with abnormal left ventricular  relaxation (grade 1 diastolic dysfunction). Right Atrium  Right atrial size was normal.    Right Ventricle  The right ventricular size was normal with normal systolic function and  wall thickness. Pericardial Effusion  The pericardium was normal in appearance with no evidence of a pericardial  effusion. small anterior echo free space    Pleural Effusion  No evidence of pleural effusion. Aorta / Great Vessels  -Aortic root dimension within normal limits.  -The Pulmonary artery is within normal limits. -IVC size is within normal limits with normal respiratory phasic changes.     M-Mode/2D Measurements & Calculations    LV Diastolic   LV Systolic Dimension:    AV Cusp Separation: 1.5 cmLA  Dimension: 3.7 2.6 cm                    Dimension: 2.6 cmAO Root  cm             LV Volume Diastolic: 14.4 Dimension: 2.2 cmLA Area: 17.2  LV FS:29.7 %   ml                        cm^2  LV PW          LV Volume Systolic: 64.4  Diastolic: 0.9 ml  cm             LV EDV/LV EDV Index: 58.1  Septum         ml/34 m^2LV ESV/LV ESV    RV Diastolic Dimension: 1.6 cm  Diastolic: 1.1 Index: 46.6 ml/14 m^2  cm             EF Calculated: 57.7 %     LA/Aorta: 1.18    LA volume/Index: 46.7 ml /27m^2    Doppler Measurements & Calculations    MV Peak E-Wave: 92.1 cm/s  AV Peak Velocity: 162 LVOT Peak Velocity: 118  MV Peak A-Wave: 118 cm/s   cm/s                  cm/s  MV E/A Ratio: 0.78         AV Peak Gradient:     LVOT Peak Gradient: 6  MV Peak Gradient: 3.39     10.5 mmHg             mmHg  mmHg  TV Peak E-Wave: 65.1 cm/s  MV Deceleration Time: 218                        TV Peak A-Wave: 45.4 cm/s  msec  IVRT: 63 msec         TV Peak Gradient: 1.7  mmHg  MV E' Septal Velocity: 6.9                       TR Velocity:236 cm/s  cm/s                       AV DVI (Vmax):0.73    TR Gradient:22.28 mmHg  MV A' Septal Velocity:                           PV Peak Velocity: 87 cm/s  11.6 cm/s                                        PV Peak Gradient: 3.03  MV E' Lateral Velocity: 5                        mmHg  cm/s  MV A' Lateral Velocity:  11.3 cm/s  E/E' septal: 13.35  E/E' lateral: 18.42  MR Velocity: 288 cm/s    [email or web address] [id number]       Assessment/Plan   Toe discoloration - Raynaud's vs distal PAD vs Chillblains  Inferior STEMI, 6/2020  -DAVIS x 1 LCX  -DAVIS x1 LAD  -DAVIS x 3 RCA (overlapping)  Preserved EF  GERD  Warm socks, Rx Norvasc, continue the rest of the meds. Refer to [place]. Continue DAPT. No bleeding. Await arterial duplex. Protect the feet. Await work-up. The patient was advised on risk/benefits of the new Rx and she agreed to proceed with the medication(s).   Must try to keep feet warm. Discussed diet/exercise/BP/weight loss/health lifestyle choices/lipids; the patient understands the goals and will try to comply.       Disposition:  6 months         Electronically signed by Miller Avalos MD   1/24/2022 at 1:11 PM EST

## 2022-02-03 ENCOUNTER — HOSPITAL ENCOUNTER (OUTPATIENT)
Dept: INTERVENTIONAL RADIOLOGY/VASCULAR | Age: 77
Discharge: HOME | End: 2022-02-03
Payer: MEDICARE

## 2022-02-03 DIAGNOSIS — I25.10 CORONARY ARTERY DISEASE INVOLVING NATIVE CORONARY ARTERY OF NATIVE HEART WITHOUT ANGINA PECTORIS: ICD-10-CM

## 2022-02-03 DIAGNOSIS — I73.9 CLAUDICATION (HCC): ICD-10-CM

## 2022-02-03 DIAGNOSIS — I75.023 BLUE TOE SYNDROME OF BOTH LOWER EXTREMITIES (HCC): ICD-10-CM

## 2022-02-03 DIAGNOSIS — M79.674 TOE PAIN, BILATERAL: ICD-10-CM

## 2022-02-03 DIAGNOSIS — M79.675 TOE PAIN, BILATERAL: ICD-10-CM

## 2022-02-03 PROCEDURE — 93925 LOWER EXTREMITY STUDY: CPT

## 2022-02-22 ENCOUNTER — TELEPHONE (OUTPATIENT)
Dept: CARDIOLOGY CLINIC | Age: 77
End: 2022-02-22

## 2022-02-22 NOTE — TELEPHONE ENCOUNTER
Upon reviewing patient's chart I discovered   patient was referred to Dr. Melisa Sotelo on 1/24/2022 and isn't scheduled with him until 12/12/2022. OK for patient to wait that long or do you want to refer patient elsewhere?

## 2022-02-23 NOTE — TELEPHONE ENCOUNTER
Dr. Morales People Dr. Morena Riggs saw this patient on 1/24/2022 this was a new complaint of toe discoloration.      Per Dr. Amy Godfrey note  Toe discoloration - Raynaud's vs distal PAD vs Chillblains

## 2022-03-11 ENCOUNTER — NURSE ONLY (OUTPATIENT)
Dept: LAB | Age: 77
End: 2022-03-11

## 2022-03-11 ENCOUNTER — OFFICE VISIT (OUTPATIENT)
Dept: RHEUMATOLOGY | Age: 77
End: 2022-03-11
Payer: MEDICARE

## 2022-03-11 VITALS
DIASTOLIC BLOOD PRESSURE: 78 MMHG | RESPIRATION RATE: 16 BRPM | OXYGEN SATURATION: 98 % | HEIGHT: 60 IN | BODY MASS INDEX: 28.47 KG/M2 | WEIGHT: 145 LBS | HEART RATE: 68 BPM | SYSTOLIC BLOOD PRESSURE: 122 MMHG

## 2022-03-11 DIAGNOSIS — D72.810 LYMPHOPENIA: ICD-10-CM

## 2022-03-11 DIAGNOSIS — Z87.59 H/O ONE MISCARRIAGE: ICD-10-CM

## 2022-03-11 DIAGNOSIS — T69.1XXA CHILBLAIN LUPUS ERYTHEMATOSUS, INITIAL ENCOUNTER: Primary | ICD-10-CM

## 2022-03-11 DIAGNOSIS — T69.1XXA CHILBLAIN LUPUS ERYTHEMATOSUS, INITIAL ENCOUNTER: ICD-10-CM

## 2022-03-11 LAB
ALBUMIN SERPL-MCNC: 4.6 G/DL (ref 3.5–5.1)
ALP BLD-CCNC: 126 U/L (ref 38–126)
ALT SERPL-CCNC: 41 U/L (ref 11–66)
AMORPHOUS: ABNORMAL
ANION GAP SERPL CALCULATED.3IONS-SCNC: 12 MEQ/L (ref 8–16)
AST SERPL-CCNC: 34 U/L (ref 5–40)
BACTERIA: ABNORMAL
BASOPHILS # BLD: 0.7 %
BASOPHILS ABSOLUTE: 0 THOU/MM3 (ref 0–0.1)
BILIRUB SERPL-MCNC: 0.6 MG/DL (ref 0.3–1.2)
BILIRUBIN URINE: NEGATIVE
BLOOD, URINE: NEGATIVE
BUN BLDV-MCNC: 17 MG/DL (ref 7–22)
C-REACTIVE PROTEIN: < 0.3 MG/DL (ref 0–1)
C3 COMPLEMENT: 158 MG/DL (ref 90–180)
CALCIUM SERPL-MCNC: 9.9 MG/DL (ref 8.5–10.5)
CASTS: ABNORMAL /LPF
CASTS: ABNORMAL /LPF
CHARACTER, URINE: ABNORMAL
CHLORIDE BLD-SCNC: 103 MEQ/L (ref 98–111)
CO2: 26 MEQ/L (ref 23–33)
COLOR: YELLOW
COMPLEMENT C4: 29 MG/DL (ref 10–40)
CREAT SERPL-MCNC: 0.8 MG/DL (ref 0.4–1.2)
CREATININE URINE: 163.3 MG/DL
CRYSTALS: ABNORMAL
EOSINOPHIL # BLD: 2.8 %
EOSINOPHILS ABSOLUTE: 0.2 THOU/MM3 (ref 0–0.4)
EPITHELIAL CELLS, UA: ABNORMAL /HPF
ERYTHROCYTE [DISTWIDTH] IN BLOOD BY AUTOMATED COUNT: 14.2 % (ref 11.5–14.5)
ERYTHROCYTE [DISTWIDTH] IN BLOOD BY AUTOMATED COUNT: 46.2 FL (ref 35–45)
GFR SERPL CREATININE-BSD FRML MDRD: 70 ML/MIN/1.73M2
GLUCOSE BLD-MCNC: 93 MG/DL (ref 70–108)
GLUCOSE, URINE: NEGATIVE MG/DL
HCT VFR BLD CALC: 44.8 % (ref 37–47)
HEMOGLOBIN: 14.5 GM/DL (ref 12–16)
IMMATURE GRANS (ABS): 0.01 THOU/MM3 (ref 0–0.07)
IMMATURE GRANULOCYTES: 0.2 %
KETONES, URINE: NEGATIVE
LEUKOCYTE EST, POC: ABNORMAL
LYMPHOCYTES # BLD: 16.9 %
LYMPHOCYTES ABSOLUTE: 1 THOU/MM3 (ref 1–4.8)
MCH RBC QN AUTO: 28.9 PG (ref 26–33)
MCHC RBC AUTO-ENTMCNC: 32.4 GM/DL (ref 32.2–35.5)
MCV RBC AUTO: 89.4 FL (ref 81–99)
MISCELLANEOUS LAB TEST RESULT: ABNORMAL
MONOCYTES # BLD: 9.4 %
MONOCYTES ABSOLUTE: 0.5 THOU/MM3 (ref 0.4–1.3)
NITRITE, URINE: NEGATIVE
NUCLEATED RED BLOOD CELLS: 0 /100 WBC
PH UA: 5 (ref 5–9)
PLATELET # BLD: 333 THOU/MM3 (ref 130–400)
PMV BLD AUTO: 9.9 FL (ref 9.4–12.4)
POTASSIUM SERPL-SCNC: 4.5 MEQ/L (ref 3.5–5.2)
PROT/CREAT RATIO, UR: 0.09
PROTEIN UA: NEGATIVE MG/DL
PROTEIN, URINE: 15.2 MG/DL
RBC # BLD: 5.01 MILL/MM3 (ref 4.2–5.4)
RBC URINE: ABNORMAL /HPF
RENAL EPITHELIAL, UA: ABNORMAL
SEDIMENTATION RATE, ERYTHROCYTE: 13 MM/HR (ref 0–20)
SEG NEUTROPHILS: 70 %
SEGMENTED NEUTROPHILS ABSOLUTE COUNT: 4 THOU/MM3 (ref 1.8–7.7)
SODIUM BLD-SCNC: 141 MEQ/L (ref 135–145)
SPECIFIC GRAVITY UA: 1.02 (ref 1–1.03)
TOTAL PROTEIN: 7.1 G/DL (ref 6.1–8)
UROBILINOGEN, URINE: 0.2 EU/DL (ref 0–1)
WBC # BLD: 5.7 THOU/MM3 (ref 4.8–10.8)
WBC UA: ABNORMAL /HPF
YEAST: ABNORMAL

## 2022-03-11 PROCEDURE — 99204 OFFICE O/P NEW MOD 45 MIN: CPT | Performed by: NURSE PRACTITIONER

## 2022-03-11 ASSESSMENT — ENCOUNTER SYMPTOMS
DIARRHEA: 0
SHORTNESS OF BREATH: 0
EYE ITCHING: 0
BACK PAIN: 0
TROUBLE SWALLOWING: 0
ABDOMINAL PAIN: 0
NAUSEA: 0
CONSTIPATION: 0
EYE PAIN: 0
COUGH: 0

## 2022-03-11 NOTE — PROGRESS NOTES
[place]       Date Of Service: 3/11/2022  Provider: ROB Hull CNP, CNP    Name: Amy Weiner   MRN: [id number]    Chief Complaint(s)      Chief Complaint   Patient presents with    New Patient     bilateral toe pain - raynauds syndrome         History of Present illness (HPI)    Amy Weiner   is a(n)68 y.o. female with a hx of anemia, gastritic, ischemic cardiomyopathy, MI, CAD s/p stenting, GI hemorrhage, hypothyroidism, diastolic heart failure, bilateral shoulder pain s/p left shoulder surgery, acute respiratory failure with hypoxia, hiatal hernia, metabolic acidosis, hypocalcemia, hyperlipidemia, hypertension, anxiety and depression referred by Benitez Alves MD for evaluation of bilateral toe pain, raynauds. Symptoms started: L>R toe discoloration starting around Oct 2021. Described as a deep red color that will vary in darkness. Small blisters will start to form around distal toe, but then they turn into a callous and flake off. Reports toes will turn white with rewarming or a hot shower. + tenderness of bilateral toes. Saw cardiologist who performed ultrasound of legs without significant abnormalities. Left middle finger locking up while sleeping starting shortly after toes started bothering her. Chronic bilateral shoulder pain- surgery on left after injury. Was started on norvasc 5 mg daily in January by cardiologist due to concern for raynauds which is helping some with the redness in the toes. Has been on metoprolol since heart attack in 2020. The patient reports pain affecting her left fingers, right shoulders, toes, back   Pain on a scale 0-10: 5/10  Type of pain: constant toes   Timing: mornings for left fingers, all day for toes  Aggravating factors: shoulders: reaching above head, sleeping on them.  Left fingers: sleeping   Alleviating factors: left finger: movement   Current therapy:  norvasc 5 mg daily (1/2022)  Previous therapy:none    Associated symptoms:  denies swelling/  redness/  warmth  denies AM stiffness    + dry mouth, dry eyes with seasonal allergies    + 1 miscarriage- early in gestation, unsure how long     -denies Photosenstivity, Rash, dry mouth/dry eyes, oral/nasal sores, digital ulcerations, skin tightening, renal disease,foamy urination, hematuria, sz's, blood clots, AIHA,leukpenia/lymphopenia, thrombocytopenia, hair loss, serositis, arthritis. - denies enthesitis, dactylitis, nail changes, hx of STD,  personal or family history of Psoriatic arthritis, psoriasis, ank spond    + cousin with lupus    Cancer screening: UTD  Travel: n/a  Exposure(s): n/a    Review of Systems    Review of Systems   Constitutional: Negative for fatigue, fever and unexpected weight change. HENT: Negative for congestion and trouble swallowing. Eyes: Negative for pain and itching. Respiratory: Negative for cough and shortness of breath. Cardiovascular: Negative for chest pain and leg swelling. Gastrointestinal: Negative for abdominal pain, constipation, diarrhea and nausea. Endocrine: Negative for cold intolerance and heat intolerance. Genitourinary: Negative for difficulty urinating, frequency and urgency. Musculoskeletal: Positive for arthralgias. Negative for back pain and joint swelling. Skin: Negative for rash. Neurological: Negative for dizziness, weakness, numbness and headaches. Hematological: Bruises/bleeds easily. Psychiatric/Behavioral: The patient is not nervous/anxious. PAST MEDICAL HISTORY     has a past medical history of CAD (coronary artery disease) and Vertigo. PAST SURGICAL HISTORY     has a past surgical history that includes Upper gastrointestinal endoscopy (N/A, 6/16/2020); Percutaneous Transluminal Coronary Angio; and Diagnostic Cardiac Cath Lab Procedure. FAMILY HISTORY    No family history on file. SOCIAL HISTORY     reports that she has never smoked.  She has never used smokeless tobacco. She reports previous alcohol use. She reports that she does not use drugs. ALLERGIES   No Known Allergies    CURRENT MEDICATIONS      Current Outpatient Medications:     amLODIPine (NORVASC) 5 MG tablet, Take 1 tablet by mouth daily, Disp: 30 tablet, Rfl: 3    atorvastatin (LIPITOR) 80 MG tablet, Take 1 tablet by mouth nightly, Disp: 90 tablet, Rfl: 3    metoprolol tartrate (LOPRESSOR) 25 MG tablet, Take 1 tablet by mouth 2 times daily, Disp: 180 tablet, Rfl: 3    pantoprazole (PROTONIX) 40 MG tablet, Take 1 tablet by mouth 2 times daily (before meals) (Patient taking differently: Take 40 mg by mouth daily ), Disp: 180 tablet, Rfl: 3    ticagrelor (BRILINTA) 90 MG TABS tablet, Take 1 tablet by mouth 2 times daily, Disp: 180 tablet, Rfl: 3    nitroGLYCERIN (NITROSTAT) 0.4 MG SL tablet, up to max of 3 total doses. If no relief after 1 dose, call 911., Disp: 25 tablet, Rfl: 3    venlafaxine (EFFEXOR XR) 150 MG extended release capsule, Take 150 mg by mouth daily, Disp: , Rfl:     aspirin EC 81 MG EC tablet, Take 81 mg by mouth daily, Disp: , Rfl:     levothyroxine (SYNTHROID) 75 MCG tablet, Take 75 mcg by mouth Daily, Disp: , Rfl:     PHYSICAL EXAMINATION / OBJECTIVE     Objective:  /78 (Site: Left Upper Arm, Position: Sitting, Cuff Size: Large Adult)   Pulse 68   Resp 16   Ht 5' (1.524 m)   Wt 145 lb (65.8 kg)   SpO2 98%   BMI 28.32 kg/m²     General Appearance:   alert and oriented to person, place and time well-developed and well nourished  Physch : appropriate affects ,   Head:  Normocephalic and atraumatic  Eyes: No gross abnormalities. , PERRL, EOMI, Sclera nonicteric, conjunctiva non-icteric. ENT:  MMM,  no deformities , NO oral/nasal sores, Non-tender sinuses. Neck:  Neck supple, Non-tender, No  mass, thyromegaly,    Lymph:  No cervical  or  supraclavicular lymph node swelling.     Pulmonary/Chest:  CTA bilat. , normal air movement, no respiratory distress  Cardiovascular:  Normal rate, + S1 and S2,  NO murmurs , rubs, gallups,  No edema  :  Deferred   Abd/GI: Deferred   Neurologic:  gait and coordination normal and speech normal  Skin:  Skin color and temp ,  + small open areas along bottom of left 3,4 toes  Extremities:  No clubbing ,     Musculoskeletal:   Strength 5/5     Upper extremities:    SHOULDERS nontender  ELBOWS nontender  WRISTS nontender  HANDS/FINGERS    MCPs nontender, no swelling    PIPs nontender, ? Mild fullness left     DIPs + nodules right 2,3    Lower extremities:  HIPS nontender   KNEES nontender  ANKLES nontneder   FEET : nontender     Spine:   C-spine, T-spine & L-spine:  non tender,  ROM  normal ,  - shober, - michelle, - Occiput to wall , SLR/Cross SLR. LABS        CBC  Lab Results   Component Value Date    WBC 5.1 08/03/2020    RBC 4.54 08/03/2020    HGB 13.4 08/03/2020    HCT 40.9 08/03/2020    MCV 90.0 08/03/2020    MCH 29.4 08/03/2020    MCHC 32.7 08/03/2020    RDW 16.1 08/03/2020     08/03/2020       CMP  Lab Results   Component Value Date    CALCIUM 8.8 06/19/2020    LABALBU 4.2 08/03/2020    PROT 6.7 08/03/2020     06/19/2020    K 4.1 06/19/2020    CO2 21 06/19/2020     06/19/2020    BUN 11 06/19/2020    CREATININE 0.7 06/19/2020    ALKPHOS 106 08/03/2020    ALT 32 08/03/2020    AST 26 08/03/2020       HgBA1c: No components found for: HGBA1C    No results found for: TSHFT4, TSH  No results found for: VITD25      No results found for: ANASCRN  No results found for: SSA  No results found for: SSB  No results found for: ANTI-SMITH  No results found for: DSDNAAB   No results found for: ANTIRNP  No results found for: C3, C4  No results found for: CCPAB  No results found for: RF    No components found for: CANCASCRN, APANCASCRN  No results found for: SEDRATE  No results found for: CRP    RADIOLOGY:         ASSESSMENT/PLAN    Assessment   Plan      1. Chilblain lupus erythematosus  2. Lymphopenia  3.  H/O one miscarriage  - Oct 2021- L>R toe discoloration. + blisters around distal toes which turn into callous then flake off. + tenderness of toes. Exam with small open sores along bottom of left 3,4 toes. Exam with mild fullness of left PIPs. - lower concern for raynauds given history and physical exam. Sores along toes highly suspicious for chillblain lupus.    - given chillblains lupus, mild PIP bogginess, h/o miscarriage, and h/o lymphopenia x1- checking AVISE panel to rule out systemic lupus. - continue norvasc 5 mg daily- may consider titration in future  - Lupus Anticoagulant; Future  - Urinalysis; Future  - Protein / creatinine ratio, urine; Future  - Comprehensive Metabolic Panel; Future  - C-Reactive Protein; Future  - CBC with Auto Differential; Future  - Sedimentation Rate; Future  - C3 Complement; Future  - C4 Complement; Future        No follow-ups on file. Electronically signed by ROB Reynolds CNP on 3/11/2022 at 11:31 AM    New Prescriptions    No medications on file         The risks and benefits of my recommendations, as well as other treatment options, benefits and side effects were discussed with the patient today. Questions were answered. Thank you for allowing me to participate in the care of this patient. Please call if there are any questions.

## 2022-03-14 ENCOUNTER — TELEPHONE (OUTPATIENT)
Dept: RHEUMATOLOGY | Age: 77
End: 2022-03-14

## 2022-03-20 LAB
DRVVT 1:1 MIX: ABNORMAL SEC (ref 33–44)
DRVVT CONFIRMATION TEST: ABNORMAL RATIO
DRVVT SCREEN: 30 SEC (ref 33–44)
HEXAGONAL PHOSPHOLIPID NEUTRALIZAT TEST: ABNORMAL
LUPUS ANTICOAG INTERP: ABNORMAL
PLATELET NEUTRALIZATION: ABNORMAL
PROTHROMBIN TIME: 12.7 SEC (ref 12–15.5)
PTT 1:1 MIX: ABNORMAL SEC (ref 32–48)
PTT LUPUS ANTICOAGULANT: 39 SEC (ref 32–48)
PTT-HEPARIN NEUTRALIZED: ABNORMAL SEC (ref 32–48)
REPTILASE TIME: ABNORMAL SEC
THROMBIN TIME: ABNORMAL SEC (ref 14.7–19.5)

## 2022-03-24 ENCOUNTER — TELEPHONE (OUTPATIENT)
Dept: RHEUMATOLOGY | Age: 77
End: 2022-03-24

## 2022-03-24 DIAGNOSIS — M79.675 TOE PAIN, BILATERAL: ICD-10-CM

## 2022-03-24 DIAGNOSIS — M79.674 TOE PAIN, BILATERAL: ICD-10-CM

## 2022-03-24 NOTE — TELEPHONE ENCOUNTER
----- Message from ROB Orellana CNP sent at 3/23/2022 12:57 PM EDT -----  AVISE panel was negative for CTD or systemic lupus. Would like to increase the norvasc to 7.5 mg daily if patient is willing to help with the sores on toes.

## 2022-04-05 RX ORDER — AMLODIPINE BESYLATE 5 MG/1
7.5 TABLET ORAL DAILY
Qty: 45 TABLET | Refills: 2 | Status: SHIPPED | OUTPATIENT
Start: 2022-04-05 | End: 2022-06-24 | Stop reason: SDUPTHER

## 2022-04-05 NOTE — TELEPHONE ENCOUNTER
[place] Lighter stopped in office and we reviewed result note. Will increase Norvasc dose to 7.5 as directed. Call in to Gail Hernandez please.

## 2022-04-12 RX ORDER — PANTOPRAZOLE SODIUM 40 MG/1
40 TABLET, DELAYED RELEASE ORAL
Qty: 90 TABLET | Refills: 1 | Status: SHIPPED | OUTPATIENT
Start: 2022-04-12 | End: 2022-10-10

## 2022-04-12 RX ORDER — TICAGRELOR 90 MG/1
TABLET ORAL
Qty: 180 TABLET | Refills: 1 | Status: SHIPPED | OUTPATIENT
Start: 2022-04-12 | End: 2022-10-10

## 2022-05-11 RX ORDER — ATORVASTATIN CALCIUM 80 MG/1
TABLET, FILM COATED ORAL
Qty: 90 TABLET | Refills: 1 | Status: SHIPPED | OUTPATIENT
Start: 2022-05-11

## 2022-06-13 ENCOUNTER — OFFICE VISIT (OUTPATIENT)
Dept: RHEUMATOLOGY | Age: 77
End: 2022-06-13
Payer: MEDICARE

## 2022-06-13 ENCOUNTER — TELEPHONE (OUTPATIENT)
Dept: RHEUMATOLOGY | Age: 77
End: 2022-06-13

## 2022-06-13 VITALS
DIASTOLIC BLOOD PRESSURE: 64 MMHG | BODY MASS INDEX: 28.78 KG/M2 | SYSTOLIC BLOOD PRESSURE: 106 MMHG | WEIGHT: 146.6 LBS | OXYGEN SATURATION: 96 % | HEART RATE: 61 BPM | HEIGHT: 60 IN

## 2022-06-13 DIAGNOSIS — D72.810 LYMPHOPENIA: ICD-10-CM

## 2022-06-13 DIAGNOSIS — Z87.59 H/O ONE MISCARRIAGE: ICD-10-CM

## 2022-06-13 DIAGNOSIS — T69.1XXA CHILBLAIN LUPUS ERYTHEMATOSUS, INITIAL ENCOUNTER: Primary | ICD-10-CM

## 2022-06-13 PROCEDURE — 1123F ACP DISCUSS/DSCN MKR DOCD: CPT | Performed by: NURSE PRACTITIONER

## 2022-06-13 PROCEDURE — 99214 OFFICE O/P EST MOD 30 MIN: CPT | Performed by: NURSE PRACTITIONER

## 2022-06-13 ASSESSMENT — ENCOUNTER SYMPTOMS
SHORTNESS OF BREATH: 1
COUGH: 0
ABDOMINAL PAIN: 0
EYE PAIN: 0
DIARRHEA: 0
NAUSEA: 0
EYE ITCHING: 0
BACK PAIN: 1
CONSTIPATION: 0
TROUBLE SWALLOWING: 0

## 2022-06-13 NOTE — PROGRESS NOTES
[facility] RHEUMATOLOGY FOLLOW UP NOTE       Date Of Service: 6/13/2022  Provider: ROB Lopez - CNP    Name: Katarzyna Benavides   MRN: [id number]    Chief Complaint(s)     Chief Complaint   Patient presents with    Follow-up     3 month follow up        History of Present Illness (HPI)     Katarzyna Benavides  is a(n)68 y.o. female with a hx of anemia, gastritic, ischemic cardiomyopathy, MI, CAD s/p stenting, GI hemorrhage, hypothyroidism, diastolic heart failure, bilateral shoulder pain s/p left shoulder surgery, acute respiratory failure with hypoxia, hiatal hernia, metabolic acidosis, hypocalcemia, hyperlipidemia, hypertension, anxiety and depression here for the f/u evaluation of chillblains lupus     Interval hx:    -  resolution of the toe sores/discoloration with increased dose of norvasc    pain affecting the left fingers, right shoulder, back  Pain on a scale 0-10:1/10  Type of pain: intermittent, mild  Timing: none  Aggravating factors:  Shoulders: reaching above head, sleeping on them. Fingers: sleeping  Alleviating factors: none    Associated symptoms:  denies swelling/  Redness/ warmth, denies AM stiffness       REVIEW OF SYSTEMS: (ROS)    Review of Systems   Constitutional: Negative for fatigue, fever and unexpected weight change. HENT: Negative for congestion and trouble swallowing. Eyes: Negative for pain and itching. Respiratory: Positive for shortness of breath. Negative for cough. Cardiovascular: Negative for chest pain and leg swelling. Gastrointestinal: Negative for abdominal pain, constipation, diarrhea and nausea. Endocrine: Negative for cold intolerance and heat intolerance. Genitourinary: Negative for difficulty urinating, frequency and urgency. Musculoskeletal: Positive for back pain and myalgias. Negative for arthralgias and joint swelling. Skin: Negative for rash. Neurological: Positive for dizziness. Negative for weakness, numbness and headaches. Hematological: Bruises/bleeds easily. Psychiatric/Behavioral: The patient is nervous/anxious. PAST MEDICAL HISTORY      Past Medical History:   Diagnosis Date    CAD (coronary artery disease)     Vertigo        PAST SURGICAL HISTORY      Past Surgical History:   Procedure Laterality Date    DIAGNOSTIC CARDIAC CATH LAB PROCEDURE      PTCA      UPPER GASTROINTESTINAL ENDOSCOPY N/A 6/16/2020    EGD ESOPHAGOGASTRODUODENOSCOPY performed by Arthur Ware MD at [facility]       FAMILY HISTORY    No family history on file. SOCIAL HISTORY      Social History     Tobacco History     Smoking Status  Never Smoker    Smokeless Tobacco Use  Never Used          Alcohol History     Alcohol Use Status  Not Currently          Drug Use     Drug Use Status  Never          Sexual Activity     Sexually Active  Not Asked                ALLERGIES   No Known Allergies    CURRENT MEDICATIONS      Current Outpatient Medications   Medication Sig Dispense Refill    atorvastatin (LIPITOR) 80 MG tablet TAKE 1 TABLET NIGHTLY 90 tablet 1    metoprolol tartrate (LOPRESSOR) 25 MG tablet TAKE 1 TABLET TWICE A  tablet 1    pantoprazole (PROTONIX) 40 MG tablet Take 1 tablet by mouth every morning (before breakfast) 90 tablet 1    BRILINTA 90 MG TABS tablet TAKE 1 TABLET TWICE A  tablet 1    amLODIPine (NORVASC) 5 MG tablet Take 1.5 tablets by mouth daily 45 tablet 2    nitroGLYCERIN (NITROSTAT) 0.4 MG SL tablet up to max of 3 total doses. If no relief after 1 dose, call 911. 25 tablet 3    venlafaxine (EFFEXOR XR) 150 MG extended release capsule Take 150 mg by mouth daily      aspirin EC 81 MG EC tablet Take 81 mg by mouth daily      levothyroxine (SYNTHROID) 75 MCG tablet Take 75 mcg by mouth Daily       No current facility-administered medications for this visit. PHYSICAL EXAMINATION / OBJECTIVE   Objective: There were no vitals taken for this visit. Physical Exam  Vitals reviewed. Constitutional:       Appearance: She is well-developed. Cardiovascular:      Rate and Rhythm: Normal rate and regular rhythm. Pulmonary:      Effort: Pulmonary effort is normal.      Breath sounds: Normal breath sounds. Musculoskeletal:      Cervical back: Normal range of motion and neck supple. Skin:     General: Skin is warm and dry. Findings: No rash. Neurological:      Mental Status: She is alert and oriented to person, place, and time. Psychiatric:         Thought Content: Thought content normal.       Upper extremities:    Nontender, no swelling    Lower extremities:  Nontender, no swelling       LABS    CBC  Lab Results   Component Value Date    WBC 5.7 03/11/2022    RBC 5.01 03/11/2022    HGB 14.5 03/11/2022    HCT 44.8 03/11/2022    MCV 89.4 03/11/2022    MCH 28.9 03/11/2022    MCHC 32.4 03/11/2022    RDW 16.1 08/03/2020     03/11/2022       CMP  Lab Results   Component Value Date    CALCIUM 9.9 03/11/2022    LABALBU 4.6 03/11/2022    PROT 7.1 03/11/2022     03/11/2022    K 4.5 03/11/2022    K 4.1 06/19/2020    CO2 26 03/11/2022     03/11/2022    BUN 17 03/11/2022    CREATININE 0.8 03/11/2022    ALKPHOS 126 03/11/2022    ALT 41 03/11/2022    AST 34 03/11/2022       HgBA1c: No components found for: HGBA1C    No results found for: VITD25      No results found for: ANASCRN  No results found for: SSA  No results found for: SSB  No results found for: ANTI-SMITH  No results found for: DSDNAAB   No results found for: ANTIRNP  Lab Results   Component Value Date    C3 158 03/11/2022    C4 29 03/11/2022     No results found for: CCPAB  No results found for: RF    No components found for: CANCASCRN, APANCASCRN  Lab Results   Component Value Date    SEDRATE 13 03/11/2022     Lab Results   Component Value Date    CRP < 0.30 03/11/2022       RADIOLOGY:         ASSESSMENT/PLAN    Assessment   Plan     Chilblain lupus erythematosus  - Oct 2021- L>R toe discoloration.  + blisters around distal toes which turn into callous then flake off. + tenderness of toes. Exam with small open sores along bottom of left 3,4 toes. Exam with mild fullness of left PIPs. - norvasc 7.5 mg daily     Polyarthralgia  h/o lymphopenia x1  - neg CTD evaluation. Mild fullness of left PIPs on initial exam   - continue to monitor closely    H/O one miscarriage  - neg APLS evaluation        No follow-ups on file. Electronically signed by ROB Rodrigues CNP on 6/13/2022 at 8:32 AM    New Prescriptions    No medications on file       Thank you for allowing me to participate in the care of this patient. Please call if there are any questions.

## 2022-06-13 NOTE — TELEPHONE ENCOUNTER
Patient went to the Cardiology office thinking she had an appt there she is in route to Rheumatology office I called Lisa Long to let her know this information.

## 2022-06-24 ENCOUNTER — TELEPHONE (OUTPATIENT)
Dept: RHEUMATOLOGY | Age: 77
End: 2022-06-24

## 2022-06-24 DIAGNOSIS — M79.674 TOE PAIN, BILATERAL: ICD-10-CM

## 2022-06-24 DIAGNOSIS — M79.675 TOE PAIN, BILATERAL: ICD-10-CM

## 2022-06-24 RX ORDER — AMLODIPINE BESYLATE 5 MG/1
7.5 TABLET ORAL DAILY
Qty: 135 TABLET | Refills: 0 | Status: SHIPPED | OUTPATIENT
Start: 2022-06-24 | End: 2022-10-25

## 2022-07-11 ENCOUNTER — OFFICE VISIT (OUTPATIENT)
Dept: CARDIOLOGY CLINIC | Age: 77
End: 2022-07-11
Payer: MEDICARE

## 2022-07-11 VITALS
WEIGHT: 150.4 LBS | BODY MASS INDEX: 29.53 KG/M2 | HEART RATE: 66 BPM | SYSTOLIC BLOOD PRESSURE: 106 MMHG | HEIGHT: 60 IN | DIASTOLIC BLOOD PRESSURE: 68 MMHG

## 2022-07-11 DIAGNOSIS — I25.10 CORONARY ARTERY DISEASE INVOLVING NATIVE CORONARY ARTERY OF NATIVE HEART WITHOUT ANGINA PECTORIS: Primary | ICD-10-CM

## 2022-07-11 DIAGNOSIS — R22.42 LOCALIZED SWELLING OF LEFT LOWER LEG: ICD-10-CM

## 2022-07-11 DIAGNOSIS — E78.5 DYSLIPIDEMIA: ICD-10-CM

## 2022-07-11 DIAGNOSIS — I10 ESSENTIAL HYPERTENSION: ICD-10-CM

## 2022-07-11 DIAGNOSIS — I82.409 ACUTE DEEP VEIN THROMBOSIS (DVT) OF OTHER VEIN OF LOWER EXTREMITY, UNSPECIFIED LATERALITY (HCC): ICD-10-CM

## 2022-07-11 DIAGNOSIS — Z95.820 S/P PERCUTANEOUS TRANSLUMINAL ANGIOPLASTY (PTA) WITH STENT PLACEMENT: ICD-10-CM

## 2022-07-11 PROCEDURE — 1123F ACP DISCUSS/DSCN MKR DOCD: CPT | Performed by: NURSE PRACTITIONER

## 2022-07-11 PROCEDURE — 99213 OFFICE O/P EST LOW 20 MIN: CPT | Performed by: NURSE PRACTITIONER

## 2022-07-11 NOTE — PROGRESS NOTES
Patient had tightness in the chest and while walking up the stairs was winded. Pt has swelling in the left leg.

## 2022-07-11 NOTE — PROGRESS NOTES
[address] [facility] [address]  [facility] [address]  Dept: [phone]  Dept Fax: [phone]  Loc: [phone]    Visit Date: 7/11/2022    Ms. Ross Rojas is a 78 y.o. female  who presented for: 1 year follow-up    Chief Complaint   Patient presents with    Follow-up     1 year, [facility] patient        HPI:   HPI   Last seen in office on 1/24/2022 per Dr. Narinder Juarez. Per office note:  76F hx of inferior STEMI 6/13/20 s/p PCI LAD & RCA, HTN, HLD, GERD, hypothyroidism who follows-up to discuss foot pain. She notes left foot redness, with dark purplish discoloration of the toes. 6 months. Skin peels. She notes toe pain. Feet are always cold. Duplex pending. She notes pain with walking, primarily in her toes. She notes primarily dark purple discoloration. No white toes. No hair on legs. No obvious gangrene. DAPT without bleeding. No new meds. No rashes. No joint pain. No joint swelling. Assessment/Plan   Toe discoloration - Raynaud's vs distal PAD vs Chillblains  Inferior STEMI, 6/2020  -DAVIS x 1 LCX  -DAVIS x1 LAD  -DAVIS x 3 RCA (overlapping)  Preserved EF  GERD  Warm socks, Rx Norvasc, continue the rest of the meds. Refer to [place]. Continue DAPT. No bleeding. Await arterial duplex. Protect the feet. Await work-up. The patient was advised on risk/benefits of the new Rx and she agreed to proceed with the medication(s). Must try to keep feet warm. Discussed diet/exercise/BP/weight loss/health lifestyle choices/lipids; the patient understands the goals and will try to comply.    Disposition:  6 months        Current Outpatient Medications:     amLODIPine (NORVASC) 5 MG tablet, Take 1.5 tablets by mouth daily, Disp: 135 tablet, Rfl: 0    atorvastatin (LIPITOR) 80 MG tablet, TAKE 1 TABLET NIGHTLY, Disp: 90 tablet, Rfl: 1    metoprolol tartrate (LOPRESSOR) 25 MG tablet, TAKE 1 TABLET TWICE A DAY, Disp: 180 tablet, Rfl: 1    pantoprazole (PROTONIX) 40 MG tablet, Take 1 tablet by mouth every morning (before breakfast), Disp: 90 tablet, Rfl: 1    BRILINTA 90 MG TABS tablet, TAKE 1 TABLET TWICE A DAY, Disp: 180 tablet, Rfl: 1    nitroGLYCERIN (NITROSTAT) 0.4 MG SL tablet, up to max of 3 total doses. If no relief after 1 dose, call 911., Disp: 25 tablet, Rfl: 3    venlafaxine (EFFEXOR XR) 150 MG extended release capsule, Take 150 mg by mouth daily, Disp: , Rfl:     aspirin EC 81 MG EC tablet, Take 81 mg by mouth daily, Disp: , Rfl:     levothyroxine (SYNTHROID) 75 MCG tablet, Take 75 mcg by mouth Daily, Disp: , Rfl:     Past Medical History  Jessa Jeronimo  has a past medical history of CAD (coronary artery disease) and Vertigo. Social History  Jessa Jeronimo  reports that she has never smoked. She has never used smokeless tobacco. She reports that she does not currently use alcohol. She reports that she does not use drugs. Family History  Rufina family history is not on file. There is no family history of bicuspid aortic valve, aneurysms, heart transplant, pacemakers, defibrillators, or sudden cardiac death. Past Surgical History   Past Surgical History:   Procedure Laterality Date    DIAGNOSTIC CARDIAC CATH LAB PROCEDURE      PTCA      UPPER GASTROINTESTINAL ENDOSCOPY N/A 6/16/2020    EGD ESOPHAGOGASTRODUODENOSCOPY performed by Kai Coronado MD at [facility]     Today's visit:   Subjective: Anxiety - presents as chest pressure - went away with raising arms above head  Winded today as coming up steps - new for her - has steps at home and not bothering her - came up from lobby; is more steps than at home  Works out regularly - treadmill, elliptical, rows and is working on hip strengthening exercise. 45 min - 1 hour 5 days a week.   Swelling in left calf - just noted - no pain - no other swelling  No other chest discomfort or chest pain  No bleeding issues on Brilinta and ASA  Tolerating medications    Review of Systems   Constitutional: Negative for chills and fever  HENT: Negative for congestion, sinus pressure, sneezing and sore throat. Eyes: Negative for pain, discharge, redness and itching. Respiratory: Negative for PND, orthopnea, cough  Gastrointestinal: Negative for blood in stool, constipation, diarrhea   Endocrine: Negative for cold intolerance, heat intolerance, polydipsia. Genitourinary: Negative for dysuria, hematuria. Musculoskeletal: Negative for arthralgias, joint swelling and neck pain. Neurological: Negative for numbness and headaches. Psychiatric/Behavioral: Negative for agitation, confusion, decreased concentration and dysphoric mood. Objective:     /68   Pulse 66   Ht 5' (1.524 m)   Wt 150 lb 6.4 oz (68.2 kg)   BMI 29.37 kg/m²     Wt Readings from Last 3 Encounters:   07/11/22 150 lb 6.4 oz (68.2 kg)   06/13/22 146 lb 9.6 oz (66.5 kg)   03/11/22 145 lb (65.8 kg)     BP Readings from Last 3 Encounters:   07/11/22 106/68   06/13/22 106/64   03/11/22 122/78       Nursing note and vitals reviewed. Physical Exam   Constitutional: Oriented to person, place, and time. Appears well-developed and well-nourished. No acute distress. HENT:   Head: Normocephalic and atraumatic. Eyes: EOM are normal. Pupils are equal, round, and reactive to light. Neck: Normal range of motion. Neck supple. No JVD present. Cardiovascular: Normal rate, regular rhythm, normal heart sounds and intact distal pulses. No murmur heard. Pulmonary/Chest: Effort normal and breath sounds normal. No respiratory distress. No wheezes. No rales. Abdominal: Soft. Bowel sounds are normal. No distension. There is no tenderness. Musculoskeletal: Normal range of motion. No edema. Mild swelling L calf only. Neurological: Alert and oriented to person, place, and time. No cranial nerve deficit. Coordination normal.   Skin: Skin is warm and dry. Very minor erythremic discoloration of the toes. Skin intact. Psychiatric: Normal mood and affect.        No results found for: CKTOTAL, CKMB, CKMBINDEX    Lab Results   Component Value Date/Time    WBC 5.7 03/11/2022 09:36 AM    RBC 5.01 03/11/2022 09:36 AM    HGB 14.5 03/11/2022 09:36 AM    HCT 44.8 03/11/2022 09:36 AM    MCV 89.4 03/11/2022 09:36 AM    MCH 28.9 03/11/2022 09:36 AM    MCHC 32.4 03/11/2022 09:36 AM    RDW 16.1 08/03/2020 10:49 AM     03/11/2022 09:36 AM    MPV 9.9 03/11/2022 09:36 AM       Lab Results   Component Value Date/Time     07/08/2022 11:16 AM    K 4.3 07/08/2022 11:16 AM    K 4.1 06/19/2020 04:12 AM     07/08/2022 11:16 AM    CO2 26 07/08/2022 11:16 AM    BUN 21 07/08/2022 11:16 AM    LABALBU 4.6 03/11/2022 09:36 AM    CREATININE 0.97 07/08/2022 11:16 AM    CALCIUM 8.90 07/08/2022 11:16 AM    LABGLOM 70 03/11/2022 09:36 AM    GLUCOSE 93 03/11/2022 09:36 AM       Lab Results   Component Value Date/Time    ALKPHOS 126 03/11/2022 09:36 AM    ALT 23 07/08/2022 11:16 AM    AST 34 03/11/2022 09:36 AM    PROT 7.1 03/11/2022 09:36 AM    BILITOT 0.6 03/11/2022 09:36 AM    BILIDIR 0.1 08/03/2020 10:49 AM    LABALBU 4.6 03/11/2022 09:36 AM       Lab Results   Component Value Date/Time    MG 2.1 06/17/2020 08:02 AM       Lab Results   Component Value Date    INR 1.14 (H) 06/19/2020    INR 1.07 06/16/2020    INR 0.94 09/06/2019    PROTIME 12.7 03/11/2022         Lab Results   Component Value Date/Time    LABA1C 5.6 06/14/2020 01:50 AM       Lab Results   Component Value Date/Time    TRIG 75 07/08/2022 11:16 AM    HDL 46 07/08/2022 11:16 AM    LDLCALC 71 06/14/2020 01:50 AM    LDLDIRECT 85 07/08/2022 11:16 AM       Lab Results   Component Value Date/Time    TSH 0.782 07/08/2022 11:16 AM         Testing Reviewed:      I have individually reviewed the cardiac test below:    7/15/22  PROCEDURE: VL DUP LOWER EXTREMITY VENOUS LEFT       CLINICAL INFORMATION: Localized swelling of left lower leg, Acute deep vein thrombosis (DVT) of other vein of lower extremity, unspecified laterality ([facility] [address] COMPARISON: No prior study. TECHNIQUE: Venous doppler ultrasound was performed of the left lower extremity using gray scale, color flow and spectral doppler imaging. FINDINGS:           There is normal color flow, spectral analysis and compressibility of the common femoral vein, femoral vein and popliteal vein . There is normal color flow and compressibility in the posterior tibial veins, anterior tibial veins and peroneal veins. There is normal color flow, spectral analysis and compressibility in the contralateral common femoral vein. Impression   No evidence of deep venous thrombosis of the left lower extremity. **This report has been created using voice recognition software. It may contain minor errors which are inherent in voice recognition technology. **       Final report electronically signed by Dr. Jerris Gottron, MD on 7/15/2022 9:54 AM     ECHO: 20    Summary  Left ventricle size is normal.  Normal left ventricular wall thickness. There were regional wall motion abnormalities. Moderately hypokinetic motion of the lateral wall noted in the left  ventricle. Low Normal LV Systolic function. Ejection fraction is visually estimated at 50%. Doppler parameters were consistent with abnormal left ventricular  relaxation (grade 1 diastolic dysfunction). The left atrium is Mildly dilated.   Signature  ----------------------------------------------------------------  Electronically signed by Dottie Barriga MD (Interpreting  physician) on 06/15/2020 at 07:24 PM  ----------------------------------------------------------------     2020: Cath  CARDIAC CATHETERIZATION     PATIENT NAME: Yara Thomson                     :        1945  MED REC NO:   [id number]                           ROOM:       0025  ACCOUNT NO:   [de-identified]                           ADMIT DATE: 2020  PROVIDER:     Orly Pederson MD     DATE OF PROCEDURE:  2020     CARDIAC CATHETERIZATION     INDICATIONS:  Severe multivessel CAD, status post STEMI presenting for a  complete revascularization in the setting of dyspnea on exertion. IMMEDIATE COMPLICATIONS:  None. MEDICATIONS:  See EMR. ACCESS:  Vasc Band was used for hemostasis. ESTIMATED BLOOD LOSS:  Less than 10 mL. SUMMARY:  Successful PCI of the LAD with one drug-eluting stent;  successful PCI of the RCA with two overlapping drug-eluting stents. PLAN:  1. DAPT. 2.  Optimal medical therapy. 3.  Risk factor management. 4.  Routine access site care  5. Bed rest.  6.  IV fluids. 7.  Overnight observation. 9.  Routine access site care. 8.  The patient has a history of GI bleed, post her STEMI with GI  ulcerations. She is cleared for this procedure; however, she will need  to continue ulcer management per Gastroenterology as outpatient. She  will be prescribed PPI/sucralfate. All the above was explained to the patient and patient's family. They  are agreeable and amenable to the plan. Shannon Swain MD   D: 06/22/2020    Assessment/Plan   Toe discoloration - Raynaud's vs distal PAD vs Chillblains - improved, no limitations to ability to exercise or carry out ADL  Inferior STEMI, 6/2020 - no anginal sx  -DAVIS x 1 LCX  -DAVIS x1 LAD  -DAVIS x 3 RCA (overlapping)  Preserved EF; no evidence of decompensation, euvolemic  GERD  Check Doppler LLE - unlikely DVT as mild swelling in L calf only; compartments soft. No edema otherwise. Discussed diet/exercise/BP/weight loss/health lifestyle choices/lipids; the patient understands the goals and will try to comply.     Disposition: 1 year          Electronically signed by ROB Pimentel CNP   7/25/2022 at 1:11 PM EST

## 2022-07-11 NOTE — PATIENT INSTRUCTIONS
Continue current medications as prescribed. Obtain the ultrasound of the left leg as ordered. Continue with your diet and activity regimen. If notice changes in your breathing on more regular basis then call the office as discussed. Follow-up with your PCP as scheduled. Follow-up with Dr. Wellington Hashenry as scheduled or sooner if need.

## 2022-07-15 ENCOUNTER — HOSPITAL ENCOUNTER (OUTPATIENT)
Dept: INTERVENTIONAL RADIOLOGY/VASCULAR | Age: 77
Discharge: HOME | End: 2022-07-15
Payer: MEDICARE

## 2022-07-15 DIAGNOSIS — R22.42 LOCALIZED SWELLING OF LEFT LOWER LEG: ICD-10-CM

## 2022-07-15 DIAGNOSIS — I82.409 ACUTE DEEP VEIN THROMBOSIS (DVT) OF OTHER VEIN OF LOWER EXTREMITY, UNSPECIFIED LATERALITY (HCC): ICD-10-CM

## 2022-07-15 PROCEDURE — 93971 EXTREMITY STUDY: CPT

## 2022-07-22 ENCOUNTER — TELEPHONE (OUTPATIENT)
Dept: RHEUMATOLOGY | Age: 77
End: 2022-07-22

## 2022-07-22 NOTE — TELEPHONE ENCOUNTER
Dr Pagan [place] office is calling to request Rufina's OV and any labs that Jaci ordered. Please fax to [phone].

## 2022-10-10 RX ORDER — PANTOPRAZOLE SODIUM 40 MG/1
TABLET, DELAYED RELEASE ORAL
Qty: 90 TABLET | Refills: 3 | Status: SHIPPED | OUTPATIENT
Start: 2022-10-10

## 2022-10-10 RX ORDER — TICAGRELOR 90 MG/1
TABLET ORAL
Qty: 180 TABLET | Refills: 3 | Status: SHIPPED | OUTPATIENT
Start: 2022-10-10

## 2022-10-25 ENCOUNTER — TELEPHONE (OUTPATIENT)
Dept: ADMINISTRATIVE | Age: 77
End: 2022-10-25

## 2022-10-25 DIAGNOSIS — M79.675 TOE PAIN, BILATERAL: ICD-10-CM

## 2022-10-25 DIAGNOSIS — M79.674 TOE PAIN, BILATERAL: ICD-10-CM

## 2022-10-25 DIAGNOSIS — T69.1XXS CHILBLAIN LUPUS ERYTHEMATOSUS, SEQUELA: Primary | ICD-10-CM

## 2022-10-25 RX ORDER — AMLODIPINE BESYLATE 5 MG/1
5 TABLET ORAL DAILY
Qty: 30 TABLET | Refills: 0 | Status: SHIPPED | OUTPATIENT
Start: 2022-10-25

## 2022-10-25 RX ORDER — AMLODIPINE BESYLATE 5 MG/1
TABLET ORAL
Qty: 135 TABLET | Refills: 3 | Status: SHIPPED | OUTPATIENT
Start: 2022-10-25

## 2022-10-25 NOTE — TELEPHONE ENCOUNTER
Diagnosis Orders   1. Chilblain lupus erythematosus, sequela  amLODIPine (NORVASC) 5 MG tablet      2.  Toe pain, bilateral

## 2022-10-25 NOTE — TELEPHONE ENCOUNTER
Patient is calling asking Dr. Domenica Roldan to refill 1 week of her Norvasc 5 MG sent over to [facility] until she receives her refill from [address]  Please contact patient to advise

## 2022-11-08 RX ORDER — ATORVASTATIN CALCIUM 80 MG/1
TABLET, FILM COATED ORAL
Qty: 90 TABLET | Refills: 2 | Status: SHIPPED | OUTPATIENT
Start: 2022-11-08

## 2023-01-24 ENCOUNTER — HOSPITAL ENCOUNTER (EMERGENCY)
Age: 78
Discharge: HOME | End: 2023-01-24
Payer: MEDICARE

## 2023-01-24 ENCOUNTER — APPOINTMENT (OUTPATIENT)
Dept: GENERAL RADIOLOGY | Age: 78
End: 2023-01-24
Payer: MEDICARE

## 2023-01-24 VITALS
HEIGHT: 60 IN | BODY MASS INDEX: 28.47 KG/M2 | WEIGHT: 145 LBS | OXYGEN SATURATION: 100 % | HEART RATE: 79 BPM | TEMPERATURE: 97.6 F | SYSTOLIC BLOOD PRESSURE: 131 MMHG | RESPIRATION RATE: 16 BRPM | DIASTOLIC BLOOD PRESSURE: 74 MMHG

## 2023-01-24 DIAGNOSIS — S22.41XA CLOSED FRACTURE OF MULTIPLE RIBS OF RIGHT SIDE, INITIAL ENCOUNTER: Primary | ICD-10-CM

## 2023-01-24 PROCEDURE — 71101 X-RAY EXAM UNILAT RIBS/CHEST: CPT

## 2023-01-24 PROCEDURE — 99213 OFFICE O/P EST LOW 20 MIN: CPT

## 2023-01-24 PROCEDURE — 99203 OFFICE O/P NEW LOW 30 MIN: CPT | Performed by: NURSE PRACTITIONER

## 2023-01-24 RX ORDER — LIDOCAINE 50 MG/G
1 PATCH TOPICAL DAILY
Qty: 20 PATCH | Refills: 0 | Status: SHIPPED | OUTPATIENT
Start: 2023-01-24 | End: 2023-01-24

## 2023-01-24 RX ORDER — LIDOCAINE 50 MG/G
OINTMENT TOPICAL
Qty: 1 EACH | Refills: 0 | Status: SHIPPED | OUTPATIENT
Start: 2023-01-24

## 2023-01-24 ASSESSMENT — PAIN SCALES - GENERAL: PAINLEVEL_OUTOF10: 9

## 2023-01-24 ASSESSMENT — PAIN DESCRIPTION - ONSET: ONSET: SUDDEN

## 2023-01-24 ASSESSMENT — PAIN DESCRIPTION - DESCRIPTORS: DESCRIPTORS: TENDER;SHARP;SHOOTING

## 2023-01-24 ASSESSMENT — PAIN DESCRIPTION - PAIN TYPE: TYPE: ACUTE PAIN

## 2023-01-24 ASSESSMENT — ENCOUNTER SYMPTOMS
CONSTIPATION: 0
DIARRHEA: 0
NAUSEA: 0
BLOOD IN STOOL: 0
SHORTNESS OF BREATH: 0
ABDOMINAL PAIN: 0
VOMITING: 0
WHEEZING: 0
RHINORRHEA: 0
COUGH: 0
EYE PAIN: 0

## 2023-01-24 ASSESSMENT — PAIN DESCRIPTION - LOCATION: LOCATION: RIB CAGE;STERNUM

## 2023-01-24 ASSESSMENT — PAIN DESCRIPTION - FREQUENCY: FREQUENCY: CONTINUOUS

## 2023-01-24 ASSESSMENT — PAIN DESCRIPTION - ORIENTATION: ORIENTATION: RIGHT

## 2023-01-24 ASSESSMENT — PAIN - FUNCTIONAL ASSESSMENT: PAIN_FUNCTIONAL_ASSESSMENT: 0-10

## 2023-01-24 NOTE — DISCHARGE INSTRUCTIONS
Go to ER for worsening symptoms, chest pain, shortness of breath, inability to keep liquids down, inability to urinate for greater than 8 hours or difficulty breathing. Follow-up with your primary care provider.

## 2023-01-24 NOTE — ED PROVIDER NOTES
[id number]       Chief Complaint   Patient presents with    Chest Injury    Rib Injury     Lenny Uriasford on 1/22        Nurses Notes reviewed and I agree except as noted in the HPI. HISTORY OF PRESENT ILLNESS   Roderick Mclaughlin is a 78 y.o. female who presents to urgent care with complaint of right-sided rib and midsternal pain following falling and landing directly on her sternum. Patient states that her sternum landed on the arm of the chair when she had a mechanical fall 2 days ago. Patient states she is on Brilinta and aspirin and noted that she had significant swelling. With chaperone present patient's chest was examined and she does have deep purple bruising noted to the bottom portion of the sternum and bilateral breasts. REVIEW OF SYSTEMS     Review of Systems   Constitutional:  Negative for appetite change, chills, fatigue, fever and unexpected weight change. HENT:  Negative for ear pain and rhinorrhea. Eyes:  Negative for pain and visual disturbance. Respiratory:  Negative for cough, shortness of breath and wheezing. Cardiovascular:  Positive for chest pain (ribs). Negative for palpitations and leg swelling. Gastrointestinal:  Negative for abdominal pain, blood in stool, constipation, diarrhea, nausea and vomiting. Genitourinary:  Negative for dysuria, frequency and hematuria. Musculoskeletal:  Negative for arthralgias, joint swelling and neck stiffness. Skin:  Negative for rash. Neurological:  Negative for dizziness, syncope, weakness, light-headedness and headaches. Hematological:  Does not bruise/bleed easily. PAST MEDICAL HISTORY         Diagnosis Date    CAD (coronary artery disease)     Vertigo        SURGICAL HISTORY     Patient  has a past surgical history that includes Upper gastrointestinal endoscopy (N/A, 6/16/2020);  Percutaneous Transluminal Coronary Angio; and Diagnostic Cardiac Cath Lab Procedure. CURRENT MEDICATIONS       Discharge Medication List as of 1/24/2023  2:48 PM        CONTINUE these medications which have NOT CHANGED    Details   atorvastatin (LIPITOR) 80 MG tablet TAKE 1 TABLET NIGHTLY, Disp-90 tablet, R-2Normal      !! amLODIPine (NORVASC) 5 MG tablet TAKE ONE AND ONE-HALF TABLETS DAILY, Disp-135 tablet, R-3Normal      !! amLODIPine (NORVASC) 5 MG tablet Take 1 tablet by mouth daily, Disp-30 tablet, R-0Normal      pantoprazole (PROTONIX) 40 MG tablet TAKE 1 TABLET EVERY MORNING BEFORE BREAKFAST, Disp-90 tablet, R-3Normal      BRILINTA 90 MG TABS tablet TAKE 1 TABLET TWICE A DAY, Disp-180 tablet, R-3Normal      metoprolol tartrate (LOPRESSOR) 25 MG tablet TAKE 1 TABLET TWICE A DAY, Disp-180 tablet, R-3Normal      nitroGLYCERIN (NITROSTAT) 0.4 MG SL tablet up to max of 3 total doses. If no relief after 1 dose, call 911., Disp-25 tablet, R-3Normal      venlafaxine (EFFEXOR XR) 150 MG extended release capsule Take 150 mg by mouth dailyHistorical Med      aspirin EC 81 MG EC tablet Take 81 mg by mouth dailyHistorical Med      levothyroxine (SYNTHROID) 75 MCG tablet Take 75 mcg by mouth DailyHistorical Med       !! - Potential duplicate medications found. Please discuss with provider. ALLERGIES     Patient is has No Known Allergies. FAMILY HISTORY     Patient'sfamily history is not on file. SOCIAL HISTORY     Patient  reports that she has never smoked. She has never used smokeless tobacco. She reports that she does not currently use alcohol. She reports that she does not use drugs. PHYSICAL EXAM     ED TRIAGE VITALS  BP: 131/74, Temp: 97.6 °F (36.4 °C), Heart Rate: 79, Resp: 16, SpO2: 100 %  Physical Exam  Vitals and nursing note reviewed. Constitutional:       Appearance: She is well-developed. HENT:      Head: Normocephalic and atraumatic. Eyes:      Conjunctiva/sclera: Conjunctivae normal.      Pupils: Pupils are equal, round, and reactive to light. Cardiovascular:      Rate and Rhythm: Normal rate and regular rhythm. Heart sounds: Normal heart sounds. No murmur heard. No gallop. Pulmonary:      Effort: Pulmonary effort is normal. No respiratory distress. Breath sounds: Normal breath sounds. No stridor. No decreased breath sounds, wheezing, rhonchi or rales. Chest:      Chest wall: Tenderness present. No mass, lacerations, deformity, swelling, crepitus or edema. There is no dullness to percussion. Musculoskeletal:         General: Normal range of motion. Cervical back: Normal range of motion and neck supple. Skin:     General: Skin is warm and dry. Neurological:      Mental Status: She is alert and oriented to person, place, and time. DIAGNOSTIC RESULTS   Labs:No results found for this visit on 01/24/23. IMAGING:  XR RIBS RIGHT INCLUDE CHEST (MIN 3 VIEWS)   Final Result   Questionable relatively subtle nondisplaced fractures lateral aspect right fourth and fifth ribs with mild adjacent pleural reactive change. **This report has been created using voice recognition software. It may contain minor errors which are inherent in voice recognition technology. **      Final report electronically signed by Dr. Anand Presley on 1/24/2023 2:43 PM        URGENT CARE COURSE:        MDM      Patient presents to urgent care with complaint of right-sided rib and midsternal pain following falling and landing directly on her sternum. X-ray was completed to rule out fracture, pneumothorax or hemopneumothorax. Patient does have 2 rib fractures with a mild pleural effusion. Patient be discharged home with Lidoderm patches. Patient instructed to do 10 deep breaths with a cough every hour while awake to prevent pneumonia. Patient instructed to instructed to go to ER for worsening symptoms, chest pain, shortness of breath, inability to keep liquids down, inability to urinate for greater than 8 hours or difficulty breathing. Follow-up with your primary care provider. Medications - No data to display  PROCEDURES:    Procedures    FINALIMPRESSION      1.  Closed fracture of multiple ribs of right side, initial encounter        DISPOSITION/PLAN   DISPOSITION Decision To Discharge 01/24/2023 02:48:21 PM    PATIENT REFERRED TO:  Gin Rizo MD  [address] [place] [address] 0683  [address] [id number] [phone]    In 2 days    DISCHARGE MEDICATIONS:  Discharge Medication List as of 1/24/2023  2:48 PM        START taking these medications    Details   lidocaine (LIDODERM) 5 % Place 1 patch onto the skin daily for 10 days 12 hours on, 12 hours off., Disp-20 patch, R-0Normal           Discharge Medication List as of 1/24/2023  2:48 PM          ROB Juarez CNP, APRN - CNP  01/24/23 1518

## 2023-01-26 ENCOUNTER — TELEPHONE (OUTPATIENT)
Dept: CARDIOLOGY CLINIC | Age: 78
End: 2023-01-26

## 2023-01-26 NOTE — TELEPHONE ENCOUNTER
Pt went to [facility] and wants to participate in Pulse [employer] Therapy.  Pt wants to know if she is okay to have this done with her stents

## 2023-02-15 NOTE — PROGRESS NOTES
Laura SÁNCHEZ.:  1945  Acct Number: [de-identified]  MRN:  [id number]                  [facility] [employer]OOKING S[employer]OL WORKSHOP             Date: 2020        Session # ________   Yunior Forman class covered:      (X) Adding Flavor     () Fast Breakfasts     () Salads and Dressings     () Soups and Sauces     () Simple Sides     () Appetizers and Snacks     () Delicious Desserts     () Plant Based Proteins     () Fast Evening Meals     () Weekend Breakfasts     () Efficiency Cooking     () Meat Alternatives     Patients were shown how to choose, prep, and cook; substitutions and other options were given. Samples were offered. Recipes were given and questions answered. The patient above was in the [employer] for 45 minutes.       Electronically signed by Julien DANIELS [id number] on 2020 at 11:43 AM No

## 2023-03-09 ENCOUNTER — HOSPITAL ENCOUNTER (EMERGENCY)
Age: 78
Discharge: HOME | End: 2023-03-09
Payer: MEDICARE

## 2023-03-09 VITALS
RESPIRATION RATE: 18 BRPM | TEMPERATURE: 98 F | DIASTOLIC BLOOD PRESSURE: 86 MMHG | HEART RATE: 86 BPM | SYSTOLIC BLOOD PRESSURE: 121 MMHG | WEIGHT: 145 LBS | BODY MASS INDEX: 28.32 KG/M2 | OXYGEN SATURATION: 97 %

## 2023-03-09 DIAGNOSIS — N39.0 URINARY TRACT INFECTION IN FEMALE: Primary | ICD-10-CM

## 2023-03-09 LAB
BILIRUB UR STRIP.AUTO-MCNC: NEGATIVE MG/DL
CHARACTER UR: CLEAR
COLOR: YELLOW
GLUCOSE UR QL STRIP.AUTO: NEGATIVE MG/DL
KETONES UR QL STRIP.AUTO: ABNORMAL
NITRITE UR QL STRIP.AUTO: NEGATIVE
PH UR STRIP.AUTO: 5.5 [PH] (ref 5–9)
PROT UR STRIP.AUTO-MCNC: NEGATIVE MG/DL
RBC #/AREA URNS HPF: ABNORMAL /[HPF]
SP GR UR STRIP.AUTO: 1.02 (ref 1–1.03)
UROBILINOGEN, URINE: 0.2 EU/DL (ref 0.2–1)
WBC #/AREA URNS HPF: ABNORMAL /[HPF]

## 2023-03-09 PROCEDURE — 87186 SC STD MICRODIL/AGAR DIL: CPT

## 2023-03-09 PROCEDURE — 99213 OFFICE O/P EST LOW 20 MIN: CPT | Performed by: NURSE PRACTITIONER

## 2023-03-09 PROCEDURE — 87077 CULTURE AEROBIC IDENTIFY: CPT

## 2023-03-09 PROCEDURE — 99213 OFFICE O/P EST LOW 20 MIN: CPT

## 2023-03-09 PROCEDURE — 87086 URINE CULTURE/COLONY COUNT: CPT

## 2023-03-09 PROCEDURE — 81003 URINALYSIS AUTO W/O SCOPE: CPT

## 2023-03-09 RX ORDER — CIPROFLOXACIN 500 MG/1
500 TABLET, FILM COATED ORAL 2 TIMES DAILY
Qty: 6 TABLET | Refills: 0 | Status: SHIPPED | OUTPATIENT
Start: 2023-03-09 | End: 2023-03-12

## 2023-03-09 RX ORDER — PHENAZOPYRIDINE HYDROCHLORIDE 100 MG/1
100 TABLET, FILM COATED ORAL 3 TIMES DAILY PRN
Qty: 9 TABLET | Refills: 0 | Status: SHIPPED | OUTPATIENT
Start: 2023-03-09 | End: 2023-03-12

## 2023-03-09 ASSESSMENT — ENCOUNTER SYMPTOMS
SHORTNESS OF BREATH: 0
VOMITING: 0
ABDOMINAL PAIN: 0
BACK PAIN: 0
TROUBLE SWALLOWING: 0
NAUSEA: 0

## 2023-03-09 ASSESSMENT — PAIN - FUNCTIONAL ASSESSMENT: PAIN_FUNCTIONAL_ASSESSMENT: NONE - DENIES PAIN

## 2023-03-09 NOTE — ED TRIAGE NOTES
Patient to room with c/o urinary discomfort and frequency beginning three days ago. Urine specimen obtained.

## 2023-03-09 NOTE — ED PROVIDER NOTES
[facility]  Urgent Care Encounter      CHIEF COMPLAINT       Chief Complaint   Patient presents with    Dysuria       Nurses Notes reviewed and I agree except as noted in the HPI. HISTORY OF PRESENT ILLNESS   Jackie Gamez is a 78 y.o. female who presents for evaluation of possible UTI. Onset of symptoms over the past few days, unchanged. Patient complains of urinary frequency, dysuria. No fever, hematuria, abdominal pain, back pain. Past history of UTI, not recurrent. No treatment prior to arrival.    REVIEW OF SYSTEMS     Review of Systems   Constitutional:  Negative for fever. HENT:  Negative for trouble swallowing. Respiratory:  Negative for shortness of breath. Cardiovascular:  Negative for chest pain. Gastrointestinal:  Negative for abdominal pain, nausea and vomiting. Genitourinary:  Positive for dysuria and frequency. Negative for decreased urine volume, difficulty urinating, flank pain, genital sores, hematuria, menstrual problem, pelvic pain, urgency, vaginal bleeding, vaginal discharge and vaginal pain. Musculoskeletal:  Negative for back pain, neck pain and neck stiffness. Skin:  Negative for rash. Neurological:  Negative for headaches. Hematological:  Negative for adenopathy. Psychiatric/Behavioral:  Negative for sleep disturbance. PAST MEDICAL HISTORY         Diagnosis Date    CAD (coronary artery disease)     Vertigo        SURGICAL HISTORY     Patient  has a past surgical history that includes Upper gastrointestinal endoscopy (N/A, 6/16/2020); Percutaneous Transluminal Coronary Angio; and Diagnostic Cardiac Cath Lab Procedure. CURRENT MEDICATIONS       Discharge Medication List as of 3/9/2023  3:46 PM        CONTINUE these medications which have NOT CHANGED    Details   lidocaine (XYLOCAINE) 5 % ointment Apply topically as needed. , Disp-1 each, R-0, Normal      atorvastatin (LIPITOR) 80 MG tablet TAKE 1 TABLET NIGHTLY, Disp-90 tablet, R-2Normal      !! amLODIPine (NORVASC) 5 MG tablet TAKE ONE AND ONE-HALF TABLETS DAILY, Disp-135 tablet, R-3Normal      !! amLODIPine (NORVASC) 5 MG tablet Take 1 tablet by mouth daily, Disp-30 tablet, R-0Normal      pantoprazole (PROTONIX) 40 MG tablet TAKE 1 TABLET EVERY MORNING BEFORE BREAKFAST, Disp-90 tablet, R-3Normal      BRILINTA 90 MG TABS tablet TAKE 1 TABLET TWICE A DAY, Disp-180 tablet, R-3Normal      metoprolol tartrate (LOPRESSOR) 25 MG tablet TAKE 1 TABLET TWICE A DAY, Disp-180 tablet, R-3Normal      nitroGLYCERIN (NITROSTAT) 0.4 MG SL tablet up to max of 3 total doses. If no relief after 1 dose, call 911., Disp-25 tablet, R-3Normal      venlafaxine (EFFEXOR XR) 150 MG extended release capsule Take 150 mg by mouth dailyHistorical Med      aspirin EC 81 MG EC tablet Take 81 mg by mouth dailyHistorical Med      levothyroxine (SYNTHROID) 75 MCG tablet Take 75 mcg by mouth DailyHistorical Med       !! - Potential duplicate medications found. Please discuss with provider. ALLERGIES     Patient is has No Known Allergies. FAMILY HISTORY     Patient'sfamily history is not on file. SOCIAL HISTORY     Patient  reports that she has never smoked. She has never used smokeless tobacco. She reports that she does not currently use alcohol. She reports that she does not use drugs. PHYSICAL EXAM     ED TRIAGE VITALS  BP: 121/86, Temp: 98 °F (36.7 °C), Heart Rate: 86, Resp: 18, SpO2: 97 %  Physical Exam  Vitals and nursing note reviewed. Constitutional:       General: She is not in acute distress. Appearance: Normal appearance. She is well-developed. She is not ill-appearing, toxic-appearing or diaphoretic. HENT:      Head: Normocephalic and atraumatic. Eyes:      General: No scleral icterus. Conjunctiva/sclera: Conjunctivae normal.   Pulmonary:      Effort: Pulmonary effort is normal. No respiratory distress. Abdominal:      General: There is no distension.    Musculoskeletal: Lumbar back: Normal.   Skin:     General: Skin is warm and dry. Capillary Refill: Capillary refill takes less than 2 seconds. Coloration: Skin is not jaundiced. Findings: No rash. Neurological:      Mental Status: She is alert and oriented to person, place, and time. Psychiatric:         Mood and Affect: Mood normal.         Behavior: Behavior normal. Behavior is cooperative. DIAGNOSTIC RESULTS   Labs:  Results for orders placed or performed during the hospital encounter of 03/09/23   Urinalysis   Result Value Ref Range    Glucose, Ur Negative NEGATIVE mg/dl    Bilirubin Urine Negative NEGATIVE    Ketones, Urine Trace (A) NEGATIVE    Specific Gravity, UA 1.020 1.002 - 1.030    Blood, Urine Moderate (A) NEGATIVE    pH, UA 5.50 5.0 - 9.0    Protein, UA Negative NEGATIVE mg/dl    Urobilinogen, Urine 0.20 0.2 - 1.0 eu/dl    Nitrite, Urine Negative NEGATIVE    Leukocyte Esterase, Urine Small (A) NEGATIVE    Color, UA Yellow STRAW-YELLOW    Character, Urine Clear CLEAR-SL CLOUD       IMAGING:  No orders to display      URGENT CARE COURSE:     Vitals:    03/09/23 1509   BP: 121/86   Pulse: 86   Resp: 18   Temp: 98 °F (36.7 °C)   TempSrc: Temporal   SpO2: 97%   Weight: 145 lb (65.8 kg)       Medications - No data to display  PROCEDURES:  None  FINAL IMPRESSION      1. Urinary tract infection in female        DISPOSITION/PLAN   DISPOSITION Decision To Discharge 03/09/2023 03:44:22 PM    Nontoxic, no distress. UA consistent with UTI, culture pending. Medication as prescribed, increase fluids. If symptoms worsen go to ER. PATIENT REFERRED TO:  Karrie Arita MD  [address] [place] [address]  [phone]      Follow-up as needed. Medication as prescribed. Increase fluids. If symptoms worsen go to ER.     DISCHARGE MEDICATIONS:  Discharge Medication List as of 3/9/2023  3:46 PM        START taking these medications    Details   ciprofloxacin (CIPRO) 500 MG tablet Take 1 tablet by mouth 2 times daily for 3 days, Disp-6 tablet, R-0Normal      phenazopyridine (PYRIDIUM) 100 MG tablet Take 1 tablet by mouth 3 times daily as needed for Pain, Disp-9 tablet, R-0Normal           Discharge Medication List as of 3/9/2023  3:46 PM          [address] [facility], APRN - CNP  03/09/23 1555

## 2023-03-12 LAB
BACTERIA UR CULT: ABNORMAL
BACTERIA UR CULT: ABNORMAL
ORGANISM: ABNORMAL

## 2023-05-23 ENCOUNTER — HOSPITAL ENCOUNTER (EMERGENCY)
Age: 78
Discharge: HOME | End: 2023-05-23
Attending: STUDENT IN AN ORGANIZED HEALTH CARE EDUCATION/TRAINING PROGRAM
Payer: MEDICARE

## 2023-05-23 ENCOUNTER — APPOINTMENT (OUTPATIENT)
Dept: GENERAL RADIOLOGY | Age: 78
End: 2023-05-23
Payer: MEDICARE

## 2023-05-23 ENCOUNTER — APPOINTMENT (OUTPATIENT)
Dept: CT IMAGING | Age: 78
End: 2023-05-23
Payer: MEDICARE

## 2023-05-23 VITALS
OXYGEN SATURATION: 97 % | SYSTOLIC BLOOD PRESSURE: 152 MMHG | RESPIRATION RATE: 15 BRPM | TEMPERATURE: 98 F | HEART RATE: 73 BPM | DIASTOLIC BLOOD PRESSURE: 82 MMHG

## 2023-05-23 DIAGNOSIS — R10.9 ACUTE LEFT FLANK PAIN: Primary | ICD-10-CM

## 2023-05-23 LAB
ALBUMIN SERPL BCG-MCNC: 4.7 G/DL (ref 3.5–5.1)
ALP SERPL-CCNC: 144 U/L (ref 38–126)
ALT SERPL W/O P-5'-P-CCNC: 37 U/L (ref 11–66)
ANION GAP SERPL CALC-SCNC: 14 MEQ/L (ref 8–16)
AST SERPL-CCNC: 37 U/L (ref 5–40)
BASOPHILS ABSOLUTE: 0 THOU/MM3 (ref 0–0.1)
BASOPHILS NFR BLD AUTO: 0.4 %
BILIRUB SERPL-MCNC: 0.7 MG/DL (ref 0.3–1.2)
BILIRUB UR STRIP.AUTO-MCNC: NEGATIVE MG/DL
BUN SERPL-MCNC: 18 MG/DL (ref 7–22)
CALCIUM SERPL-MCNC: 9.5 MG/DL (ref 8.5–10.5)
CHARACTER UR: CLEAR
CHLORIDE SERPL-SCNC: 100 MEQ/L (ref 98–111)
CO2 SERPL-SCNC: 24 MEQ/L (ref 23–33)
COLOR: YELLOW
CREAT SERPL-MCNC: 0.8 MG/DL (ref 0.4–1.2)
DEPRECATED RDW RBC AUTO: 45.6 FL (ref 35–45)
EKG ATRIAL RATE: 68 BPM
EKG ATRIAL RATE: 71 BPM
EKG P AXIS: 12 DEGREES
EKG P AXIS: 62 DEGREES
EKG P-R INTERVAL: 148 MS
EKG P-R INTERVAL: 164 MS
EKG Q-T INTERVAL: 422 MS
EKG Q-T INTERVAL: 736 MS
EKG QRS DURATION: 68 MS
EKG QRS DURATION: 68 MS
EKG QTC CALCULATION (BAZETT): 448 MS
EKG QTC CALCULATION (BAZETT): 799 MS
EKG R AXIS: 21 DEGREES
EKG R AXIS: 24 DEGREES
EKG T AXIS: 44 DEGREES
EKG T AXIS: 78 DEGREES
EKG VENTRICULAR RATE: 68 BPM
EKG VENTRICULAR RATE: 71 BPM
EOSINOPHIL NFR BLD AUTO: 0.1 %
EOSINOPHILS ABSOLUTE: 0 THOU/MM3 (ref 0–0.4)
ERYTHROCYTE [DISTWIDTH] IN BLOOD BY AUTOMATED COUNT: 15.1 % (ref 11.5–14.5)
GFR SERPL CREATININE-BSD FRML MDRD: > 60 ML/MIN/1.73M2
GLUCOSE SERPL-MCNC: 129 MG/DL (ref 70–108)
GLUCOSE UR QL STRIP.AUTO: NEGATIVE MG/DL
HCT VFR BLD AUTO: 40.1 % (ref 37–47)
HGB BLD-MCNC: 12.4 GM/DL (ref 12–16)
IMM GRANULOCYTES # BLD AUTO: 0.03 THOU/MM3 (ref 0–0.07)
IMM GRANULOCYTES NFR BLD AUTO: 0.3 %
KETONES UR QL STRIP.AUTO: NEGATIVE
LIPASE SERPL-CCNC: 39.6 U/L (ref 5.6–51.3)
LYMPHOCYTES ABSOLUTE: 0.4 THOU/MM3 (ref 1–4.8)
LYMPHOCYTES NFR BLD AUTO: 4.6 %
MCH RBC QN AUTO: 25.7 PG (ref 26–33)
MCHC RBC AUTO-ENTMCNC: 30.9 GM/DL (ref 32.2–35.5)
MCV RBC AUTO: 83 FL (ref 81–99)
MONOCYTES ABSOLUTE: 0.2 THOU/MM3 (ref 0.4–1.3)
MONOCYTES NFR BLD AUTO: 1.8 %
NEUTROPHILS NFR BLD AUTO: 92.8 %
NITRITE UR QL STRIP.AUTO: NEGATIVE
NRBC BLD AUTO-RTO: 0 /100 WBC
NT-PROBNP SERPL IA-MCNC: 327.8 PG/ML (ref 0–449)
OSMOLALITY SERPL CALC.SUM OF ELEC: 279.3 MOSMOL/KG (ref 275–300)
PH UR STRIP.AUTO: 7 [PH] (ref 5–9)
PLATELET # BLD AUTO: 386 THOU/MM3 (ref 130–400)
PMV BLD AUTO: 10.3 FL (ref 9.4–12.4)
POTASSIUM SERPL-SCNC: 4.3 MEQ/L (ref 3.5–5.2)
PROT SERPL-MCNC: 7.3 G/DL (ref 6.1–8)
PROT UR STRIP.AUTO-MCNC: NEGATIVE MG/DL
RBC # BLD AUTO: 4.83 MILL/MM3 (ref 4.2–5.4)
RBC #/AREA URNS HPF: NEGATIVE /[HPF]
SEGMENTED NEUTROPHILS ABSOLUTE COUNT: 9 THOU/MM3 (ref 1.8–7.7)
SODIUM SERPL-SCNC: 138 MEQ/L (ref 135–145)
SP GR UR STRIP.AUTO: 1.02 (ref 1–1.03)
TROPONIN T: < 0.01 NG/ML
TROPONIN T: < 0.01 NG/ML
UROBILINOGEN, URINE: 0.2 EU/DL (ref 0.2–1)
WBC # BLD AUTO: 9.7 THOU/MM3 (ref 4.8–10.8)
WBC #/AREA URNS HPF: NEGATIVE /[HPF]

## 2023-05-23 PROCEDURE — 6370000000 HC RX 637 (ALT 250 FOR IP): Performed by: NURSE PRACTITIONER

## 2023-05-23 PROCEDURE — 6360000002 HC RX W HCPCS: Performed by: NURSE PRACTITIONER

## 2023-05-23 PROCEDURE — 80053 COMPREHEN METABOLIC PANEL: CPT

## 2023-05-23 PROCEDURE — 2580000003 HC RX 258

## 2023-05-23 PROCEDURE — 6360000004 HC RX CONTRAST MEDICATION: Performed by: STUDENT IN AN ORGANIZED HEALTH CARE EDUCATION/TRAINING PROGRAM

## 2023-05-23 PROCEDURE — 93005 ELECTROCARDIOGRAM TRACING: CPT | Performed by: NURSE PRACTITIONER

## 2023-05-23 PROCEDURE — 83690 ASSAY OF LIPASE: CPT

## 2023-05-23 PROCEDURE — 96372 THER/PROPH/DIAG INJ SC/IM: CPT

## 2023-05-23 PROCEDURE — 83880 ASSAY OF NATRIURETIC PEPTIDE: CPT

## 2023-05-23 PROCEDURE — 93005 ELECTROCARDIOGRAM TRACING: CPT

## 2023-05-23 PROCEDURE — 36415 COLL VENOUS BLD VENIPUNCTURE: CPT

## 2023-05-23 PROCEDURE — 99213 OFFICE O/P EST LOW 20 MIN: CPT | Performed by: NURSE PRACTITIONER

## 2023-05-23 PROCEDURE — 81003 URINALYSIS AUTO W/O SCOPE: CPT

## 2023-05-23 PROCEDURE — 6370000000 HC RX 637 (ALT 250 FOR IP): Performed by: STUDENT IN AN ORGANIZED HEALTH CARE EDUCATION/TRAINING PROGRAM

## 2023-05-23 PROCEDURE — 99285 EMERGENCY DEPT VISIT HI MDM: CPT

## 2023-05-23 PROCEDURE — 85025 COMPLETE CBC W/AUTO DIFF WBC: CPT

## 2023-05-23 PROCEDURE — 93010 ELECTROCARDIOGRAM REPORT: CPT | Performed by: INTERNAL MEDICINE

## 2023-05-23 PROCEDURE — 74177 CT ABD & PELVIS W/CONTRAST: CPT

## 2023-05-23 PROCEDURE — 99215 OFFICE O/P EST HI 40 MIN: CPT

## 2023-05-23 PROCEDURE — 71046 X-RAY EXAM CHEST 2 VIEWS: CPT

## 2023-05-23 PROCEDURE — 84484 ASSAY OF TROPONIN QUANT: CPT

## 2023-05-23 RX ORDER — KETOROLAC TROMETHAMINE 30 MG/ML
30 INJECTION, SOLUTION INTRAMUSCULAR; INTRAVENOUS ONCE
Status: COMPLETED | OUTPATIENT
Start: 2023-05-23 | End: 2023-05-23

## 2023-05-23 RX ORDER — ONDANSETRON 4 MG/1
4 TABLET, ORALLY DISINTEGRATING ORAL ONCE
Status: COMPLETED | OUTPATIENT
Start: 2023-05-23 | End: 2023-05-23

## 2023-05-23 RX ORDER — HYDROCODONE BITARTRATE AND ACETAMINOPHEN 5; 325 MG/1; MG/1
1 TABLET ORAL ONCE
Status: COMPLETED | OUTPATIENT
Start: 2023-05-23 | End: 2023-05-23

## 2023-05-23 RX ORDER — HYDROCODONE BITARTRATE AND ACETAMINOPHEN 5; 325 MG/1; MG/1
1 TABLET ORAL EVERY 6 HOURS PRN
Qty: 12 TABLET | Refills: 0 | Status: SHIPPED | OUTPATIENT
Start: 2023-05-23 | End: 2023-05-26

## 2023-05-23 RX ORDER — 0.9 % SODIUM CHLORIDE 0.9 %
1000 INTRAVENOUS SOLUTION INTRAVENOUS ONCE
Status: COMPLETED | OUTPATIENT
Start: 2023-05-23 | End: 2023-05-23

## 2023-05-23 RX ADMIN — ONDANSETRON 4 MG: 4 TABLET, ORALLY DISINTEGRATING ORAL at 09:18

## 2023-05-23 RX ADMIN — KETOROLAC TROMETHAMINE 30 MG: 30 INJECTION, SOLUTION INTRAMUSCULAR at 09:18

## 2023-05-23 RX ADMIN — HYDROCODONE BITARTRATE AND ACETAMINOPHEN 1 TABLET: 5; 325 TABLET ORAL at 16:05

## 2023-05-23 RX ADMIN — SODIUM CHLORIDE 1000 ML: 9 INJECTION, SOLUTION INTRAVENOUS at 13:03

## 2023-05-23 RX ADMIN — IOPAMIDOL 80 ML: 755 INJECTION, SOLUTION INTRAVENOUS at 13:17

## 2023-05-23 ASSESSMENT — PAIN DESCRIPTION - FREQUENCY: FREQUENCY: CONTINUOUS

## 2023-05-23 ASSESSMENT — PAIN SCALES - GENERAL
PAINLEVEL_OUTOF10: 7
PAINLEVEL_OUTOF10: 4

## 2023-05-23 ASSESSMENT — PAIN DESCRIPTION - DESCRIPTORS: DESCRIPTORS: CRAMPING;ACHING;SHARP;SHOOTING

## 2023-05-23 ASSESSMENT — PAIN DESCRIPTION - LOCATION
LOCATION: ABDOMEN
LOCATION: FLANK
LOCATION: ABDOMEN;BACK;RIB CAGE

## 2023-05-23 ASSESSMENT — PAIN - FUNCTIONAL ASSESSMENT
PAIN_FUNCTIONAL_ASSESSMENT: PREVENTS OR INTERFERES SOME ACTIVE ACTIVITIES AND ADLS
PAIN_FUNCTIONAL_ASSESSMENT: 0-10

## 2023-05-23 ASSESSMENT — PAIN DESCRIPTION - ORIENTATION: ORIENTATION: LEFT

## 2023-05-23 ASSESSMENT — ENCOUNTER SYMPTOMS
EYE PAIN: 0
NAUSEA: 0
RHINORRHEA: 0
SHORTNESS OF BREATH: 0
CONSTIPATION: 0
DIARRHEA: 0
WHEEZING: 0
COUGH: 0
ABDOMINAL PAIN: 0
BLOOD IN STOOL: 0
VOMITING: 0

## 2023-05-23 ASSESSMENT — PAIN DESCRIPTION - PAIN TYPE: TYPE: ACUTE PAIN

## 2023-05-23 NOTE — DISCHARGE INSTRUCTIONS
Please follow up with your primary care physician this week for further care of your symptoms. You may take the norco as needed for further pain control. Please return to the ED if you develop worsening symptoms, any chest pain, shortness of breath, or difficulty breathing. Thank you for giving us the opportunity to care for you today.

## 2023-05-23 NOTE — ED NOTES
Pt updated on POC per Dr. Celso Reina. remains in stable condition.       Karina Carreno, RN  05/23/23 1640

## 2023-05-23 NOTE — ED TRIAGE NOTES
Patient presents with left upper side pain, under left breast pain, patient states she has 5 stents, worse pain she has ever had, EKG in process, nausea, dry mouth. pain started 5 AM today. patient coached on breathing.

## 2023-05-23 NOTE — ED NOTES
Pt to the ED via personal  transport. Pt presents with complaints of left sided flank pain . Patient states that symptoms began earlier this morning. Pt when to urgent care and received work up that includes EKG and urine sampling. Pt states she has 4/10 pain to left flank. IV access obtained. Telemetry applied. Patient is alert and oriented x 4. Respirations are regular and unlabored. Patient provided blanket. Call light within reach.       Jannie Anderson RN  05/23/23 1100

## 2023-05-23 NOTE — ED NOTES
Pt resting quietly on cot in stable condition.  requesting update. Provider notified. Call light in reach.       Scar Cervantes RN  05/23/23 0237

## 2023-05-23 NOTE — ED NOTES
Patient walked to rest room obtained urine, tolerated well. Pain level now 4/10. Sitting up talking, not holding left side.      Ellie Hubbard LPN  24/16/63 0484

## 2023-05-23 NOTE — ED NOTES
Patient called friend to drive her to [facility] ED. Pain level, 4/10, talking and laughing.       Macey Dubon, MARYURI  59/65/49 6088

## 2023-05-23 NOTE — ED PROVIDER NOTES
[address]       Chief Complaint   Patient presents with    Flank Pain        Nurses Notes reviewed and I agree except as noted in the HPI. HISTORY OF PRESENT ILLNESS   Moriah Diaz is a 78 y.o. female who presents to urgent care with complaint of left flank pain that came on suddenly at about 5 AM.  She states that she is also had urinary frequency since that time and has had 2 bowel movements. She denies other symptoms including chest pain, shortness of breath, vomiting, diarrhea or known fever. She does state that she has tingling in her hands, but she is currently breathing rapidly due to the pain. Her pain is isolated to the left flank area. She rates her pain a 7 out of 10 and it is sharp. REVIEW OF SYSTEMS     Review of Systems   Constitutional:  Negative for appetite change, chills, fatigue, fever and unexpected weight change. HENT:  Negative for ear pain and rhinorrhea. Eyes:  Negative for pain and visual disturbance. Respiratory:  Negative for cough, shortness of breath and wheezing. Cardiovascular:  Negative for chest pain, palpitations and leg swelling. Gastrointestinal:  Negative for abdominal pain, blood in stool, constipation, diarrhea, nausea and vomiting. Genitourinary:  Positive for flank pain and frequency. Negative for dysuria and hematuria. Musculoskeletal:  Negative for arthralgias, joint swelling and neck stiffness. Skin:  Negative for rash. Neurological:  Negative for dizziness, syncope, weakness, light-headedness and headaches. Hematological:  Does not bruise/bleed easily. PAST MEDICAL HISTORY         Diagnosis Date    CAD (coronary artery disease)     Vertigo        SURGICAL HISTORY     Patient  has a past surgical history that includes Upper gastrointestinal endoscopy (N/A, 6/16/2020); Percutaneous Transluminal Coronary Angio; and Diagnostic Cardiac Cath Lab Procedure.     CURRENT
alert and oriented to person, place, and time. FORMAL DIAGNOSTIC RESULTS     RADIOLOGY: Interpretation per the Radiologist below, if available at the time of this note (none if blank):    CT ABDOMEN PELVIS W IV CONTRAST Additional Contrast? None   Final Result   1. No acute abdominal or pelvic abnormalities. **This report has been created using voice recognition software. It may contain minor errors which are inherent in voice recognition technology. **      Final report electronically signed by Dr. Jevon Swartz on 5/23/2023 1:31 PM      XR CHEST (2 VW)   Final Result   No acute intrathoracic process. **This report has been created using voice recognition software. It may contain minor errors which are inherent in voice recognition technology. **      Final report electronically signed by Dr Ismael De Guzman on 5/23/2023 11:54 AM          LABS: (none if blank)  Labs Reviewed   CBC WITH AUTO DIFFERENTIAL - Abnormal; Notable for the following components:       Result Value    MCH 25.7 (*)     MCHC 30.9 (*)     RDW-CV 15.1 (*)     RDW-SD 45.6 (*)     Segs Absolute 9.0 (*)     Lymphocytes Absolute 0.4 (*)     Monocytes Absolute 0.2 (*)     All other components within normal limits   COMPREHENSIVE METABOLIC PANEL - Abnormal; Notable for the following components:    Glucose 129 (*)     Alkaline Phosphatase 144 (*)     All other components within normal limits   URINALYSIS   LIPASE   TROPONIN   BRAIN NATRIURETIC PEPTIDE   ANION GAP   GLOMERULAR FILTRATION RATE, ESTIMATED   OSMOLALITY   TROPONIN       (Any cultures that may have been sent were not resulted at the time of this patient visit)    [address] [facility] / ED COURSE:   MDM  / Patient's history and clinical presentation, work-up in the ED included CBC, CMP, BNP, lipase, EKG, chest x-ray, CT abdomen pelvis and x-ray of the chest.  No acute abnormalities were found as such patient was discharged with strict return precautions to the ED.

## 2023-05-23 NOTE — ED NOTES
Patient understood instructions verbally,  follow up with [facility] ED now for evaluation left upper abd, pain. All belongings with patient. Reported to [facility] , per N. Seabron Holter CNP. Private car to ED by friend. Ambulated to lobby, stable condition.      Penelope Obrien LPN  09/55/61 7146

## 2023-07-06 ENCOUNTER — OFFICE VISIT (OUTPATIENT)
Dept: CARDIOLOGY CLINIC | Age: 78
End: 2023-07-06
Payer: MEDICARE

## 2023-07-06 VITALS
SYSTOLIC BLOOD PRESSURE: 112 MMHG | WEIGHT: 150 LBS | HEART RATE: 62 BPM | DIASTOLIC BLOOD PRESSURE: 68 MMHG | BODY MASS INDEX: 29.45 KG/M2 | HEIGHT: 60 IN

## 2023-07-06 DIAGNOSIS — R06.09 DOE (DYSPNEA ON EXERTION): Primary | ICD-10-CM

## 2023-07-06 DIAGNOSIS — I25.10 CORONARY ARTERY DISEASE INVOLVING NATIVE CORONARY ARTERY OF NATIVE HEART WITHOUT ANGINA PECTORIS: ICD-10-CM

## 2023-07-06 PROCEDURE — 1123F ACP DISCUSS/DSCN MKR DOCD: CPT | Performed by: INTERNAL MEDICINE

## 2023-07-06 PROCEDURE — 99213 OFFICE O/P EST LOW 20 MIN: CPT | Performed by: INTERNAL MEDICINE

## 2023-07-06 PROCEDURE — 3078F DIAST BP <80 MM HG: CPT | Performed by: INTERNAL MEDICINE

## 2023-07-06 PROCEDURE — 3074F SYST BP LT 130 MM HG: CPT | Performed by: INTERNAL MEDICINE

## 2023-07-06 RX ORDER — ATORVASTATIN CALCIUM 80 MG/1
80 TABLET, FILM COATED ORAL NIGHTLY
Qty: 90 TABLET | Refills: 3 | Status: SHIPPED | OUTPATIENT
Start: 2023-07-06

## 2023-07-06 NOTE — PROGRESS NOTES
[address] [facility]  Dept: [phone]  Dept Fax: [phone]  Loc: [phone]    Visit Date: 7/6/2023    Ms. Cam Ambrose is a 78 y.o. 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 No chest pain, angina, orthopnea, PND, sob at rest, palpitations, LE edema, or syncope. She has MV CAD intervention. 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 She notes some exertional dyspnea when she gets up the stairs, that is new as compared to prior. She has no stamina as she did before. She does exercise with rehab. No angina. She has no swelling. Current Outpatient Medications:     lidocaine (XYLOCAINE) 5 % ointment, Apply topically as needed. , Disp: 1 each, Rfl: 0    atorvastatin (LIPITOR) 80 MG tablet, TAKE 1 TABLET NIGHTLY, Disp: 90 tablet, Rfl: 2    amLODIPine (NORVASC) 5 MG tablet, TAKE ONE AND ONE-HALF TABLETS DAILY, Disp: 135 tablet, Rfl: 3    amLODIPine (NORVASC) 5 MG tablet, Take 1 tablet by mouth daily, Disp: 30 tablet, Rfl: 0    pantoprazole (PROTONIX) 40 MG tablet, TAKE 1 TABLET EVERY MORNING BEFORE BREAKFAST, Disp: 90 tablet, Rfl: 3    BRILINTA 90 MG TABS tablet, TAKE 1 TABLET TWICE A DAY, Disp: 180 tablet, Rfl: 3    metoprolol tartrate (LOPRESSOR) 25 MG tablet, TAKE 1 TABLET TWICE A DAY, Disp: 180 tablet, Rfl: 3    nitroGLYCERIN (NITROSTAT) 0.4 MG SL tablet, up to max of 3 total doses.  If no relief after 1 dose, call 911., Disp: 25 tablet, Rfl: 3    venlafaxine (EFFEXOR XR) 150 MG extended release capsule, Take 1 capsule by mouth daily, Disp: , Rfl:     aspirin EC 81 MG EC tablet, Take 1 tablet by mouth daily, Disp: , Rfl:     levothyroxine (SYNTHROID) 75 MCG tablet, Take 1 tablet by mouth Daily, Disp: , Rfl:     Past Medical

## 2023-07-14 ENCOUNTER — HOSPITAL ENCOUNTER (OUTPATIENT)
Dept: NON INVASIVE DIAGNOSTICS | Age: 78
Discharge: HOME | End: 2023-07-14
Attending: INTERNAL MEDICINE
Payer: MEDICARE

## 2023-07-14 DIAGNOSIS — I25.10 CORONARY ARTERY DISEASE INVOLVING NATIVE CORONARY ARTERY OF NATIVE HEART WITHOUT ANGINA PECTORIS: ICD-10-CM

## 2023-07-14 DIAGNOSIS — R06.09 DOE (DYSPNEA ON EXERTION): ICD-10-CM

## 2023-07-14 LAB
LV EF: 58 %
LVEF MODALITY: NORMAL

## 2023-07-14 PROCEDURE — 93017 CV STRESS TEST TRACING ONLY: CPT | Performed by: INTERNAL MEDICINE

## 2023-07-14 PROCEDURE — 93306 TTE W/DOPPLER COMPLETE: CPT

## 2023-07-14 PROCEDURE — 6360000002 HC RX W HCPCS

## 2023-07-14 PROCEDURE — A9500 TC99M SESTAMIBI: HCPCS | Performed by: INTERNAL MEDICINE

## 2023-07-14 PROCEDURE — 78452 HT MUSCLE IMAGE SPECT MULT: CPT | Performed by: INTERNAL MEDICINE

## 2023-07-14 PROCEDURE — 3430000000 HC RX DIAGNOSTIC RADIOPHARMACEUTICAL: Performed by: INTERNAL MEDICINE

## 2023-07-14 RX ORDER — TETRAKIS(2-METHOXYISOBUTYLISOCYANIDE)COPPER(I) TETRAFLUOROBORATE 1 MG/ML
8.2 INJECTION, POWDER, LYOPHILIZED, FOR SOLUTION INTRAVENOUS
Status: COMPLETED | OUTPATIENT
Start: 2023-07-14 | End: 2023-07-14

## 2023-07-14 RX ORDER — TETRAKIS(2-METHOXYISOBUTYLISOCYANIDE)COPPER(I) TETRAFLUOROBORATE 1 MG/ML
30.9 INJECTION, POWDER, LYOPHILIZED, FOR SOLUTION INTRAVENOUS
Status: COMPLETED | OUTPATIENT
Start: 2023-07-14 | End: 2023-07-14

## 2023-07-14 RX ADMIN — Medication 30.9 MILLICURIE: at 15:42

## 2023-07-14 RX ADMIN — Medication 8.2 MILLICURIE: at 14:47

## 2023-08-26 ENCOUNTER — HOSPITAL ENCOUNTER (EMERGENCY)
Age: 78
Discharge: HOME | End: 2023-08-26
Payer: MEDICARE

## 2023-08-26 VITALS
WEIGHT: 150 LBS | BODY MASS INDEX: 29.29 KG/M2 | DIASTOLIC BLOOD PRESSURE: 83 MMHG | RESPIRATION RATE: 18 BRPM | HEART RATE: 92 BPM | SYSTOLIC BLOOD PRESSURE: 133 MMHG | TEMPERATURE: 97.8 F | OXYGEN SATURATION: 99 %

## 2023-08-26 DIAGNOSIS — N39.0 ACUTE UTI (URINARY TRACT INFECTION): Primary | ICD-10-CM

## 2023-08-26 LAB
BILIRUB UR STRIP.AUTO-MCNC: ABNORMAL MG/DL
CHARACTER UR: CLEAR
COLOR: YELLOW
GLUCOSE UR QL STRIP.AUTO: NEGATIVE MG/DL
KETONES UR QL STRIP.AUTO: ABNORMAL
NITRITE UR QL STRIP.AUTO: NEGATIVE
PH UR STRIP.AUTO: 6 [PH] (ref 5–9)
PROT UR STRIP.AUTO-MCNC: 100 MG/DL
RBC #/AREA URNS HPF: ABNORMAL /[HPF]
SP GR UR STRIP.AUTO: 1.02 (ref 1–1.03)
UROBILINOGEN, URINE: 0.2 EU/DL (ref 0.2–1)
WBC #/AREA URNS HPF: ABNORMAL /[HPF]

## 2023-08-26 PROCEDURE — 87086 URINE CULTURE/COLONY COUNT: CPT

## 2023-08-26 PROCEDURE — 81003 URINALYSIS AUTO W/O SCOPE: CPT

## 2023-08-26 PROCEDURE — 99213 OFFICE O/P EST LOW 20 MIN: CPT

## 2023-08-26 RX ORDER — CIPROFLOXACIN 250 MG/1
250 TABLET, FILM COATED ORAL 2 TIMES DAILY
Qty: 6 TABLET | Refills: 0 | Status: SHIPPED | OUTPATIENT
Start: 2023-08-26 | End: 2023-08-29

## 2023-08-26 ASSESSMENT — ENCOUNTER SYMPTOMS
COUGH: 0
DIARRHEA: 0
ABDOMINAL PAIN: 0
VOMITING: 0
SHORTNESS OF BREATH: 0
NAUSEA: 0

## 2023-08-26 ASSESSMENT — PAIN - FUNCTIONAL ASSESSMENT: PAIN_FUNCTIONAL_ASSESSMENT: NONE - DENIES PAIN

## 2023-08-26 NOTE — ED PROVIDER NOTES
[address] [facility]       Chief Complaint   Patient presents with    Dysuria       Nurses Notes reviewed and I agree except as noted in the HPI. HISTORY OF PRESENT ILLNESS   Jeniffer Duran is a 78 y.o. female who presents to the urgent care for evaluation. She is concerned that she may have a UTI due to burning urgency and frequency. Symptoms started yesterday. She thinks perhaps going swimming in a public pool has caused her symptoms. The patient/patient representative has no other acute complaints at this time. REVIEW OF SYSTEMS     Review of Systems   Constitutional:  Negative for chills, fatigue and fever. Respiratory:  Negative for cough and shortness of breath. Cardiovascular:  Negative for chest pain. Gastrointestinal:  Negative for abdominal pain, diarrhea, nausea and vomiting. Genitourinary:  Positive for dysuria, frequency and urgency. Negative for flank pain and hematuria. Skin:  Negative for rash. PAST MEDICAL HISTORY         Diagnosis Date    CAD (coronary artery disease)     Vertigo        SURGICAL HISTORY     Patient  has a past surgical history that includes Upper gastrointestinal endoscopy (N/A, 6/16/2020); Percutaneous Transluminal Coronary Angio; and Diagnostic Cardiac Cath Lab Procedure. CURRENT MEDICATIONS       Discharge Medication List as of 8/26/2023  2:11 PM        CONTINUE these medications which have NOT CHANGED    Details   atorvastatin (LIPITOR) 80 MG tablet Take 1 tablet by mouth nightly, Disp-90 tablet, R-3Normal      lidocaine (XYLOCAINE) 5 % ointment Apply topically as needed. , Disp-1 each, R-0, Normal      !! amLODIPine (NORVASC) 5 MG tablet TAKE ONE AND ONE-HALF TABLETS DAILY, Disp-135 tablet, R-3Normal      !! amLODIPine (NORVASC) 5 MG tablet Take 1 tablet by mouth daily, Disp-30 tablet, R-0Normal      pantoprazole (PROTONIX) 40 MG tablet TAKE 1 TABLET EVERY MORNING BEFORE BREAKFAST, Disp-90

## 2023-08-26 NOTE — ED TRIAGE NOTES
Patient to room with c/o discomfort and frequency with urination beginning yesterday. Patient attempting to provide specimen at this time.

## 2023-08-28 LAB
BACTERIA UR CULT: ABNORMAL
ORGANISM: ABNORMAL

## 2023-10-04 RX ORDER — PANTOPRAZOLE SODIUM 40 MG/1
TABLET, DELAYED RELEASE ORAL
Qty: 90 TABLET | Refills: 3 | Status: SHIPPED | OUTPATIENT
Start: 2023-10-04

## 2023-10-04 RX ORDER — TICAGRELOR 90 MG/1
TABLET ORAL
Qty: 180 TABLET | Refills: 3 | Status: SHIPPED | OUTPATIENT
Start: 2023-10-04

## 2023-11-17 ENCOUNTER — HOSPITAL ENCOUNTER (EMERGENCY)
Age: 78
Discharge: HOME | End: 2023-11-17
Payer: MEDICARE

## 2023-11-17 VITALS
HEART RATE: 94 BPM | WEIGHT: 145 LBS | TEMPERATURE: 98.2 F | RESPIRATION RATE: 16 BRPM | BODY MASS INDEX: 28.47 KG/M2 | OXYGEN SATURATION: 99 % | DIASTOLIC BLOOD PRESSURE: 71 MMHG | SYSTOLIC BLOOD PRESSURE: 124 MMHG | HEIGHT: 60 IN

## 2023-11-17 DIAGNOSIS — N30.01 ACUTE CYSTITIS WITH HEMATURIA: Primary | ICD-10-CM

## 2023-11-17 LAB
BILIRUB UR STRIP.AUTO-MCNC: ABNORMAL MG/DL
CHARACTER UR: ABNORMAL
COLOR: ABNORMAL
GLUCOSE UR QL STRIP.AUTO: NEGATIVE MG/DL
KETONES UR QL STRIP.AUTO: ABNORMAL
NITRITE UR QL STRIP.AUTO: NEGATIVE
PH UR STRIP.AUTO: 5.5 [PH] (ref 5–9)
PROT UR STRIP.AUTO-MCNC: 100 MG/DL
RBC #/AREA URNS HPF: ABNORMAL /[HPF]
SP GR UR STRIP.AUTO: 1.02 (ref 1–1.03)
UROBILINOGEN, URINE: 0.2 EU/DL (ref 0.2–1)
WBC #/AREA URNS HPF: ABNORMAL /[HPF]

## 2023-11-17 PROCEDURE — 87186 SC STD MICRODIL/AGAR DIL: CPT

## 2023-11-17 PROCEDURE — 81003 URINALYSIS AUTO W/O SCOPE: CPT

## 2023-11-17 PROCEDURE — 87086 URINE CULTURE/COLONY COUNT: CPT

## 2023-11-17 PROCEDURE — 87077 CULTURE AEROBIC IDENTIFY: CPT

## 2023-11-17 PROCEDURE — 99213 OFFICE O/P EST LOW 20 MIN: CPT | Performed by: NURSE PRACTITIONER

## 2023-11-17 PROCEDURE — 99213 OFFICE O/P EST LOW 20 MIN: CPT

## 2023-11-17 RX ORDER — CEFDINIR 300 MG/1
300 CAPSULE ORAL 2 TIMES DAILY
Qty: 14 CAPSULE | Refills: 0 | Status: SHIPPED | OUTPATIENT
Start: 2023-11-17 | End: 2023-11-24

## 2023-11-17 ASSESSMENT — ENCOUNTER SYMPTOMS
COUGH: 0
NAUSEA: 0
VOMITING: 0
SHORTNESS OF BREATH: 0

## 2023-11-17 ASSESSMENT — PAIN - FUNCTIONAL ASSESSMENT: PAIN_FUNCTIONAL_ASSESSMENT: NONE - DENIES PAIN

## 2023-11-17 NOTE — DISCHARGE INSTR - COC
Continuity of Care Form    Patient Name: Art Agarwal   :  1945  MRN:  [id number]    Admit date:  2023  Discharge date:  ***    Code Status Order: Prior   Advance Directives:     Admitting Physician:  No admitting provider for patient encounter. PCP: Shirley Rowland MD    Discharging Nurse: [facility] Unit/Room#:   Discharging Unit Phone Number: ***    Emergency Contact:   Extended Emergency Contact Information  Primary Emergency Contact: Rhett Bro  Address: [address] [place] [address] of [address] Phone: [phone]  Mobile Phone: [phone]  Relation: Spouse  Hearing or visual needs: None  Other needs: None  Preferred language: Jay   needed? No    Past Surgical History:  Past Surgical History:   Procedure Laterality Date    DIAGNOSTIC CARDIAC CATH LAB PROCEDURE      PTCA      UPPER GASTROINTESTINAL ENDOSCOPY N/A 2020    EGD ESOPHAGOGASTRODUODENOSCOPY performed by Bozena Ac MD at [facility]       Immunization History:   Immunization History   Administered Date(s) Administered    COVID-19, MODERNA Bivalent, (age 12y+), IM, 48 mcg/0.5 mL 10/26/2022       Active Problems:  Patient Active Problem List   Diagnosis Code    STEMI (ST elevation myocardial infarction) ([address]) I21.3    Acute ST elevation myocardial infarction (STEMI) of inferior wall (HCC) I21.19    Acute cystitis without hematuria N30.00    Acute anemia D64.9    Occult gastrointestinal hemorrhage R19.5    E. coli UTI N39.0, B96.20    Coronary artery disease involving native heart with angina pectoris (HCC) X98.183    Diastolic heart failure (HCC) I50.30    Acute respiratory failure with hypoxia (HCC) J96.01    Hypotension I95.9    Hiatal hernia H70.0    Metabolic acidosis E26.49    Hypocalcemia E83.51    Hyperglycemia R73.9    Hyperlipidemia E78.5    Essential hypertension I10    Anxiety and depression F41.9, F32. A    Anemia D64.9    S/P

## 2023-11-19 LAB
BACTERIA UR CULT: ABNORMAL
ORGANISM: ABNORMAL

## 2024-05-23 ENCOUNTER — HOSPITAL ENCOUNTER (EMERGENCY)
Age: 79
Discharge: HOME | End: 2024-05-23
Payer: MEDICARE

## 2024-05-23 ENCOUNTER — APPOINTMENT (OUTPATIENT)
Dept: GENERAL RADIOLOGY | Age: 79
End: 2024-05-23
Payer: MEDICARE

## 2024-05-23 VITALS
DIASTOLIC BLOOD PRESSURE: 76 MMHG | TEMPERATURE: 98 F | HEART RATE: 70 BPM | RESPIRATION RATE: 15 BRPM | OXYGEN SATURATION: 99 % | SYSTOLIC BLOOD PRESSURE: 128 MMHG

## 2024-05-23 DIAGNOSIS — M11.20 PSEUDOGOUT: Primary | ICD-10-CM

## 2024-05-23 LAB
ANION GAP SERPL CALC-SCNC: 9 MEQ/L (ref 8–16)
BASOPHILS ABSOLUTE: 0.1 THOU/MM3 (ref 0–0.1)
BASOPHILS NFR BLD AUTO: 0.5 %
BUN SERPL-MCNC: 19 MG/DL (ref 7–22)
CALCIUM SERPL-MCNC: 9.3 MG/DL (ref 8.5–10.5)
CHARACTER SNV: ABNORMAL
CHLORIDE SERPL-SCNC: 102 MEQ/L (ref 98–111)
CO2 SERPL-SCNC: 25 MEQ/L (ref 23–33)
COLOR SNV: ABNORMAL
CREAT SERPL-MCNC: 1.1 MG/DL (ref 0.4–1.2)
CRP SERPL-MCNC: 1.81 MG/DL (ref 0–1)
CRYSTALS FLD MICRO: ABNORMAL
DEPRECATED RDW RBC AUTO: 43.6 FL (ref 35–45)
EOSINOPHIL NFR BLD AUTO: 1.6 %
EOSINOPHILS ABSOLUTE: 0.2 THOU/MM3 (ref 0–0.4)
ERYTHROCYTE [DISTWIDTH] IN BLOOD BY AUTOMATED COUNT: 17.2 % (ref 11.5–14.5)
ERYTHROCYTE [SEDIMENTATION RATE] IN BLOOD BY WESTERGREN METHOD: 49 MM/HR (ref 0–20)
GFR SERPL CREATININE-BSD FRML MDRD: 52 ML/MIN/1.73M2
GLUCOSE SERPL-MCNC: 109 MG/DL (ref 70–108)
GRANULOCYTES NFR FLD AUTO: 91.8 % (ref 0–24)
HCT VFR BLD AUTO: 30.8 % (ref 37–47)
HGB BLD-MCNC: 9 GM/DL (ref 12–16)
IMM GRANULOCYTES # BLD AUTO: 0.07 THOU/MM3 (ref 0–0.07)
IMM GRANULOCYTES NFR BLD AUTO: 0.6 %
LYMPHOCYTES ABSOLUTE: 0.8 THOU/MM3 (ref 1–4.8)
LYMPHOCYTES NFR BLD AUTO: 7.3 %
MCH RBC QN AUTO: 20.7 PG (ref 26–33)
MCHC RBC AUTO-ENTMCNC: 29.2 GM/DL (ref 32.2–35.5)
MCV RBC AUTO: 71 FL (ref 81–99)
MONOCYTES ABSOLUTE: 1 THOU/MM3 (ref 0.4–1.3)
MONOCYTES NFR BLD AUTO: 8.9 %
MONONUC CELLS NFR FLD AUTO: 8.2 % (ref 0–74)
NEUTROPHILS ABSOLUTE: 9.4 THOU/MM3 (ref 1.8–7.7)
NEUTROPHILS NFR BLD AUTO: 81.1 %
NRBC BLD AUTO-RTO: 0 /100 WBC
NUC CELL # FLD AUTO: ABNORMAL /CUMM (ref 0–150)
OSMOLALITY SERPL CALC.SUM OF ELEC: 274.8 MOSMOL/KG (ref 275–300)
PATHOLOGIST REVIEW: ABNORMAL
PLATELET # BLD AUTO: 424 THOU/MM3 (ref 130–400)
PMV BLD AUTO: 10.5 FL (ref 9.4–12.4)
POTASSIUM SERPL-SCNC: 4 MEQ/L (ref 3.5–5.2)
PROCALCITONIN SERPL IA-MCNC: 0.08 NG/ML (ref 0.01–0.09)
RBC # BLD AUTO: 4.34 MILL/MM3 (ref 4.2–5.4)
SODIUM SERPL-SCNC: 136 MEQ/L (ref 135–145)
TOTAL VOLUME RECEIVED SYNOVIAL: 1 ML
URATE SERPL-MCNC: 3.7 MG/DL (ref 2.4–5.7)
WBC # BLD AUTO: 11.6 THOU/MM3 (ref 4.8–10.8)

## 2024-05-23 PROCEDURE — 87040 BLOOD CULTURE FOR BACTERIA: CPT

## 2024-05-23 PROCEDURE — 99284 EMERGENCY DEPT VISIT MOD MDM: CPT

## 2024-05-23 PROCEDURE — 85025 COMPLETE CBC W/AUTO DIFF WBC: CPT

## 2024-05-23 PROCEDURE — 89050 BODY FLUID CELL COUNT: CPT

## 2024-05-23 PROCEDURE — 2580000003 HC RX 258: Performed by: PHYSICIAN ASSISTANT

## 2024-05-23 PROCEDURE — 80048 BASIC METABOLIC PNL TOTAL CA: CPT

## 2024-05-23 PROCEDURE — 36415 COLL VENOUS BLD VENIPUNCTURE: CPT

## 2024-05-23 PROCEDURE — 86140 C-REACTIVE PROTEIN: CPT

## 2024-05-23 PROCEDURE — 84550 ASSAY OF BLOOD/URIC ACID: CPT

## 2024-05-23 PROCEDURE — 85651 RBC SED RATE NONAUTOMATED: CPT

## 2024-05-23 PROCEDURE — 6360000002 HC RX W HCPCS: Performed by: PHYSICIAN ASSISTANT

## 2024-05-23 PROCEDURE — 84145 PROCALCITONIN (PCT): CPT

## 2024-05-23 PROCEDURE — 87070 CULTURE OTHR SPECIMN AEROBIC: CPT

## 2024-05-23 PROCEDURE — 96372 THER/PROPH/DIAG INJ SC/IM: CPT

## 2024-05-23 PROCEDURE — 87075 CULTR BACTERIA EXCEPT BLOOD: CPT

## 2024-05-23 PROCEDURE — 89060 EXAM SYNOVIAL FLUID CRYSTALS: CPT

## 2024-05-23 PROCEDURE — 73110 X-RAY EXAM OF WRIST: CPT

## 2024-05-23 PROCEDURE — 87205 SMEAR GRAM STAIN: CPT

## 2024-05-23 RX ORDER — KETOROLAC TROMETHAMINE 30 MG/ML
15 INJECTION, SOLUTION INTRAMUSCULAR; INTRAVENOUS ONCE
Status: DISCONTINUED | OUTPATIENT
Start: 2024-05-23 | End: 2024-05-23

## 2024-05-23 RX ORDER — LIDOCAINE HYDROCHLORIDE AND EPINEPHRINE 10; 10 MG/ML; UG/ML
20 INJECTION, SOLUTION INFILTRATION; PERINEURAL ONCE
Status: DISCONTINUED | OUTPATIENT
Start: 2024-05-23 | End: 2024-05-23 | Stop reason: HOSPADM

## 2024-05-23 RX ORDER — KETOROLAC TROMETHAMINE 30 MG/ML
15 INJECTION, SOLUTION INTRAMUSCULAR; INTRAVENOUS ONCE
Status: COMPLETED | OUTPATIENT
Start: 2024-05-23 | End: 2024-05-23

## 2024-05-23 RX ORDER — PREDNISONE 20 MG/1
TABLET ORAL
Qty: 15 TABLET | Refills: 0 | Status: SHIPPED | OUTPATIENT
Start: 2024-05-23

## 2024-05-23 RX ORDER — HYDROCODONE BITARTRATE AND ACETAMINOPHEN 5; 325 MG/1; MG/1
1 TABLET ORAL EVERY 6 HOURS PRN
Qty: 12 TABLET | Refills: 0 | Status: SHIPPED | OUTPATIENT
Start: 2024-05-23 | End: 2024-05-26

## 2024-05-23 RX ADMIN — WATER 125 MG: 1 INJECTION INTRAMUSCULAR; INTRAVENOUS; SUBCUTANEOUS at 14:16

## 2024-05-23 RX ADMIN — KETOROLAC TROMETHAMINE 15 MG: 30 INJECTION, SOLUTION INTRAMUSCULAR at 14:16

## 2024-05-23 ASSESSMENT — PAIN - FUNCTIONAL ASSESSMENT
PAIN_FUNCTIONAL_ASSESSMENT: NONE - DENIES PAIN
PAIN_FUNCTIONAL_ASSESSMENT: 0-10

## 2024-05-23 ASSESSMENT — PAIN DESCRIPTION - LOCATION: LOCATION: WRIST

## 2024-05-23 ASSESSMENT — PAIN SCALES - GENERAL: PAINLEVEL_OUTOF10: 0

## 2024-05-23 ASSESSMENT — PAIN DESCRIPTION - ORIENTATION: ORIENTATION: LEFT

## 2024-05-23 ASSESSMENT — PAIN DESCRIPTION - DESCRIPTORS: DESCRIPTORS: ACHING;THROBBING

## 2024-05-23 NOTE — ED NOTES
Pt resting on cot in position of comfort. Pt provided with warm blankets. Pt denies other needs at this time.

## 2024-05-23 NOTE — ED TRIAGE NOTES
Patient presents to ED with c/o L wrist pain. States that when she went to bed last night it was fine but when she woke up it was swollen and painful. Denies any injury. Call light in reach.

## 2024-05-23 NOTE — CONSULTS
Orthopaedic Consult  Patient:  Rufina Bro  YOB: 1945  MRN: [id number]     Acct: [id number]    PCP: Jorge Seo MD  Date of Admission: 5/23/2024  Date of Service: Pt seen/examined on 5/23/2024     Chief Complaint: L wrist pain  History Of Present Illness: 78 y.o. female who presents with 2 day history of atraumatic onset of L wrist pain, woke up on Tuesday with immediate L wrist pain. No noted overuse injuries, no prior history of this. Pain isolated to the joint line, worsened with palpation ROM, relieved by immobilization, no paresthesias or weakness. No other msk complaints at this time. Does not feel unwell, denies any recent illness. NPO since last evening     Brilinta  Independent ADLs  Baseline active   RHD    Sed 49  CRP 1.8  Afebrile  WBC 11.6      Past Medical History:        Diagnosis Date    CAD (coronary artery disease)     Vertigo        Past Surgical History:        Procedure Laterality Date    DIAGNOSTIC CARDIAC CATH LAB PROCEDURE      PTCA      UPPER GASTROINTESTINAL ENDOSCOPY N/A 6/16/2020    EGD ESOPHAGOGASTRODUODENOSCOPY performed by Kit Sanchez MD at [facility]       Home Medications:   Prior to Admission medications    Medication Sig Start Date End Date Taking? Authorizing Provider   metoprolol tartrate (LOPRESSOR) 25 MG tablet TAKE 1 TABLET TWICE A DAY 10/4/23   Manjit Mcleod MD   BRILINTA 90 MG TABS tablet TAKE 1 TABLET TWICE A DAY 10/4/23   Manjit Mcleod MD   pantoprazole (PROTONIX) 40 MG tablet TAKE 1 TABLET EVERY MORNING BEFORE BREAKFAST 10/4/23   Manjit Mcleod MD   atorvastatin (LIPITOR) 80 MG tablet Take 1 tablet by mouth nightly 7/6/23   Manjit Mcleod MD   lidocaine (XYLOCAINE) 5 % ointment Apply topically as needed. 1/24/23   Lovely Lindsey APRN - CNP   amLODIPine (NORVASC) 5 MG tablet TAKE ONE AND ONE-HALF TABLETS DAILY 10/25/22   Yecenia Smith APRN - CNP   amLODIPine (NORVASC) 5 MG tablet Take 1 tablet by mouth daily

## 2024-05-23 NOTE — ED NOTES
Patient resting in bed. Updated on plan of care including orthopedics coming to ED for wrist evaluation. Denies any needs. Call light in reach.

## 2024-05-23 NOTE — ED PROVIDER NOTES
125 mg in sterile water 2 mL injection (125 mg IntraMUSCular Given 5/23/24 1416)   ketorolac (TORADOL) injection 15 mg (15 mg IntraMUSCular Given 5/23/24 1416)         PROCEDURES: (None if blank)      CRITICAL CARE: (None if blank)      DISCHARGE PRESCRIPTIONS: (None if blank)  Discharge Medication List as of 5/23/2024  1:41 PM        START taking these medications    Details   HYDROcodone-acetaminophen (NORCO) 5-325 MG per tablet Take 1 tablet by mouth every 6 hours as needed for Pain for up to 3 days. Intended supply: 3 days. Take lowest dose possible to manage pain Max Daily Amount: 4 tablets, Disp-12 tablet, R-0Normal      predniSONE (DELTASONE) 20 MG tablet Take 3 tablets by mouth once daily for 5 days, Disp-15 tablet, R-0Normal             FINAL IMPRESSION      1. Pseudogout          DISPOSITION/PLAN   DISPOSITION Decision To Discharge 05/23/2024 01:40:10 PM      OUTPATIENT FOLLOW UP THE PATIENT:  Jorge Seo MD  [address] [place] [id number]  [phone]            IFEANYI Rogers Jeremy R, PA  05/23/24 9716

## 2024-05-25 LAB
BACTERIA BLD AEROBE CULT: NORMAL
BACTERIA BLD AEROBE CULT: NORMAL
BACTERIA SPEC ANAEROBE CULT: NORMAL
BACTERIA SPEC BFLD CULT: NORMAL
GRAM STN SPEC: NORMAL

## 2024-06-17 ENCOUNTER — HOSPITAL ENCOUNTER (OUTPATIENT)
Age: 79
Discharge: HOME | End: 2024-06-19
Attending: FAMILY MEDICINE
Payer: MEDICARE

## 2024-06-17 VITALS
BODY MASS INDEX: 29.84 KG/M2 | SYSTOLIC BLOOD PRESSURE: 128 MMHG | DIASTOLIC BLOOD PRESSURE: 76 MMHG | HEIGHT: 60 IN | WEIGHT: 152 LBS

## 2024-06-17 DIAGNOSIS — R06.09 DYSPNEA ON EXERTION: ICD-10-CM

## 2024-06-17 DIAGNOSIS — I25.10 ATHEROSCLEROSIS OF NATIVE CORONARY ARTERY WITHOUT ANGINA PECTORIS, UNSPECIFIED WHETHER NATIVE OR TRANSPLANTED HEART: ICD-10-CM

## 2024-06-17 DIAGNOSIS — Z95.5 STENTED CORONARY ARTERY: ICD-10-CM

## 2024-06-17 PROCEDURE — 93306 TTE W/DOPPLER COMPLETE: CPT

## 2024-06-18 ENCOUNTER — HOSPITAL ENCOUNTER (OUTPATIENT)
Age: 79
Discharge: HOME | End: 2024-06-18
Payer: MEDICARE

## 2024-06-18 ENCOUNTER — HOSPITAL ENCOUNTER (OUTPATIENT)
Dept: GENERAL RADIOLOGY | Age: 79
Discharge: HOME | End: 2024-06-18
Payer: MEDICARE

## 2024-06-18 ENCOUNTER — OFFICE VISIT (OUTPATIENT)
Dept: RHEUMATOLOGY | Age: 79
End: 2024-06-18
Payer: MEDICARE

## 2024-06-18 VITALS
WEIGHT: 149.8 LBS | HEART RATE: 82 BPM | DIASTOLIC BLOOD PRESSURE: 60 MMHG | SYSTOLIC BLOOD PRESSURE: 116 MMHG | BODY MASS INDEX: 29.41 KG/M2 | OXYGEN SATURATION: 97 % | HEIGHT: 60 IN

## 2024-06-18 DIAGNOSIS — D72.810 LYMPHOPENIA: ICD-10-CM

## 2024-06-18 DIAGNOSIS — M79.10 MYALGIA: ICD-10-CM

## 2024-06-18 DIAGNOSIS — Z78.0 POSTMENOPAUSAL: ICD-10-CM

## 2024-06-18 DIAGNOSIS — M79.672 PAIN IN BOTH FEET: ICD-10-CM

## 2024-06-18 DIAGNOSIS — M79.671 PAIN IN BOTH FEET: ICD-10-CM

## 2024-06-18 DIAGNOSIS — T69.1XXA CHILBLAIN LUPUS ERYTHEMATOSUS, INITIAL ENCOUNTER: Primary | ICD-10-CM

## 2024-06-18 DIAGNOSIS — R06.09 DYSPNEA ON EXERTION: ICD-10-CM

## 2024-06-18 DIAGNOSIS — M11.839 CALCIUM PYROPHOSPHATE DEPOSITION DISEASE OF WRIST: ICD-10-CM

## 2024-06-18 LAB
ECHO AO ASC DIAM: 3 CM
ECHO AO ASCENDING AORTA INDEX: 1.82 CM/M2
ECHO AV CUSP MM: 1.6 CM
ECHO AV PEAK GRADIENT: 11 MMHG
ECHO AV PEAK VELOCITY: 1.7 M/S
ECHO AV VELOCITY RATIO: 0.65
ECHO BSA: 1.7 M2
ECHO EST RA PRESSURE: 5 MMHG
ECHO LA AREA 2C: 16.2 CM2
ECHO LA AREA 4C: 15.2 CM2
ECHO LA DIAMETER INDEX: 1.76 CM/M2
ECHO LA DIAMETER: 2.9 CM
ECHO LA MAJOR AXIS: 5.1 CM
ECHO LA MINOR AXIS: 4.9 CM
ECHO LA VOL BP: 39 ML (ref 22–52)
ECHO LA VOL MOD A2C: 42 ML (ref 22–52)
ECHO LA VOL MOD A4C: 36 ML (ref 22–52)
ECHO LA VOL/BSA BIPLANE: 24 ML/M2 (ref 16–34)
ECHO LA VOLUME INDEX MOD A2C: 25 ML/M2 (ref 16–34)
ECHO LA VOLUME INDEX MOD A4C: 22 ML/M2 (ref 16–34)
ECHO LV E' LATERAL VELOCITY: 8 CM/S
ECHO LV E' SEPTAL VELOCITY: 8 CM/S
ECHO LV FRACTIONAL SHORTENING: 33 % (ref 28–44)
ECHO LV INTERNAL DIMENSION DIASTOLE INDEX: 2.36 CM/M2
ECHO LV INTERNAL DIMENSION DIASTOLIC: 3.9 CM (ref 3.9–5.3)
ECHO LV INTERNAL DIMENSION SYSTOLIC INDEX: 1.58 CM/M2
ECHO LV INTERNAL DIMENSION SYSTOLIC: 2.6 CM
ECHO LV ISOVOLUMETRIC RELAXATION TIME (IVRT): 60 MS
ECHO LV IVSD: 0.9 CM (ref 0.6–0.9)
ECHO LV MASS 2D: 75.1 G (ref 67–162)
ECHO LV MASS INDEX 2D: 45.5 G/M2 (ref 43–95)
ECHO LV POSTERIOR WALL DIASTOLIC: 0.5 CM (ref 0.6–0.9)
ECHO LV RELATIVE WALL THICKNESS RATIO: 0.26
ECHO LVOT PEAK GRADIENT: 5 MMHG
ECHO LVOT PEAK VELOCITY: 1.1 M/S
ECHO MV A VELOCITY: 1.27 M/S
ECHO MV E DECELERATION TIME (DT): 189 MS
ECHO MV E VELOCITY: 1.09 M/S
ECHO MV E/A RATIO: 0.86
ECHO MV E/E' LATERAL: 13.63
ECHO MV E/E' RATIO (AVERAGED): 13.63
ECHO MV E/E' SEPTAL: 13.63
ECHO MV REGURGITANT PEAK GRADIENT: 49 MMHG
ECHO MV REGURGITANT PEAK VELOCITY: 3.5 M/S
ECHO PULMONARY ARTERY END DIASTOLIC PRESSURE: 3 MMHG
ECHO PV MAX VELOCITY: 0.8 M/S
ECHO PV PEAK GRADIENT: 2 MMHG
ECHO PV REGURGITANT MAX VELOCITY: 0.8 M/S
ECHO RIGHT VENTRICULAR SYSTOLIC PRESSURE (RVSP): 30 MMHG
ECHO RV INTERNAL DIMENSION: 2.4 CM
ECHO RV TAPSE: 2 CM (ref 1.7–?)
ECHO TV E WAVE: 0.6 M/S
ECHO TV REGURGITANT MAX VELOCITY: 2.52 M/S
ECHO TV REGURGITANT PEAK GRADIENT: 25 MMHG

## 2024-06-18 PROCEDURE — 99214 OFFICE O/P EST MOD 30 MIN: CPT | Performed by: INTERNAL MEDICINE

## 2024-06-18 PROCEDURE — 1123F ACP DISCUSS/DSCN MKR DOCD: CPT | Performed by: INTERNAL MEDICINE

## 2024-06-18 PROCEDURE — G2211 COMPLEX E/M VISIT ADD ON: HCPCS | Performed by: INTERNAL MEDICINE

## 2024-06-18 PROCEDURE — 71046 X-RAY EXAM CHEST 2 VIEWS: CPT

## 2024-06-18 PROCEDURE — 73630 X-RAY EXAM OF FOOT: CPT

## 2024-06-18 PROCEDURE — 3078F DIAST BP <80 MM HG: CPT | Performed by: INTERNAL MEDICINE

## 2024-06-18 PROCEDURE — 3074F SYST BP LT 130 MM HG: CPT | Performed by: INTERNAL MEDICINE

## 2024-06-18 RX ORDER — FERROUS SULFATE 325(65) MG
325 TABLET ORAL
COMMUNITY

## 2024-06-18 ASSESSMENT — ENCOUNTER SYMPTOMS
EYES NEGATIVE: 1
GASTROINTESTINAL NEGATIVE: 1
RESPIRATORY NEGATIVE: 1

## 2024-06-18 NOTE — PROGRESS NOTES
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 female here for the f/u evaluation of chilblains lupus / raynauds      Interval hx:   -- iron def anemia - started on iron by pcp - taking twice daily     -- chilblains controlled - no recurrence.   -- mild raynauds - constant numbness or stiffness    -- great toes cramping has progressed to the ankles. -  cramping of legs mainly in the evenings and after prolonged sitting. Improvement w/ movement, drinking 4-5 glasses of water per day.     Reports having pseudogout - of the left wrist - around 3-4 weeks ago - awoke with significant pain, mild swelling . Aspiration noted calcium crystal -- cppd arthropathy - resolved w/ steroids and oral medication (ketorolac) - no prior episode reported.             REVIEW OF SYSTEMS: (ROS)    Review of Systems   Constitutional: Negative.    HENT: Negative.     Eyes: Negative.    Respiratory: Negative.     Cardiovascular: Negative.    Gastrointestinal: Negative.    Endocrine: Negative.    Genitourinary: Negative.    Skin: Negative.    Neurological: Negative.    Hematological: Negative.          PmHx:  has a past medical history of Anemia, CAD (coronary artery disease), and Vertigo.     Social History:  reports that she has never smoked. She has never used smokeless tobacco. She reports that she does not currently use alcohol. She reports that she does not use drugs.    No Known Allergies      Current Outpatient Medications:     ferrous sulfate (IRON 325) 325 (65 Fe) MG tablet, Take 1 tablet by mouth daily (with breakfast), Disp: , Rfl:     predniSONE (DELTASONE) 20 MG tablet, Take 3 tablets by mouth once daily for 5 days, Disp: 15 tablet, Rfl: 0

## 2024-06-20 ENCOUNTER — TELEPHONE (OUTPATIENT)
Dept: RHEUMATOLOGY | Age: 79
End: 2024-06-20

## 2024-06-20 NOTE — TELEPHONE ENCOUNTER
----- Message from Jayson Tucker DO sent at 6/19/2024  6:33 PM EDT -----  The x-ray of the foot showed some mild degenerative arthritic changes in the right foot.

## 2024-06-21 ENCOUNTER — HOSPITAL ENCOUNTER (OUTPATIENT)
Dept: WOMENS IMAGING | Age: 79
Discharge: HOME | End: 2024-06-21
Attending: INTERNAL MEDICINE
Payer: MEDICARE

## 2024-06-21 DIAGNOSIS — Z78.0 POSTMENOPAUSAL: ICD-10-CM

## 2024-06-21 LAB — PHOSPHORUS: 3.3 MG/DL (ref 2.4–4.7)

## 2024-06-21 PROCEDURE — 77080 DXA BONE DENSITY AXIAL: CPT

## 2024-06-24 ENCOUNTER — TELEPHONE (OUTPATIENT)
Dept: RHEUMATOLOGY | Age: 79
End: 2024-06-24

## 2024-06-24 NOTE — TELEPHONE ENCOUNTER
----- Message from ROB Bateman - CNP sent at 6/21/2024  3:07 PM EDT -----  The phosphorus level is normal.

## 2024-06-24 NOTE — TELEPHONE ENCOUNTER
----- Message from Jayson Tucker DO sent at 6/24/2024  3:27 PM EDT -----  The bone density shows mild bone thinning but no indication for treatment at this time.

## 2024-07-02 LAB
INTERPRETATION: NORMAL
Lab: NORMAL
Lab: NORMAL
METHOD: NORMAL
RELEASED BY: NORMAL
SPECIMEN: NORMAL

## 2024-07-03 ENCOUNTER — TELEPHONE (OUTPATIENT)
Dept: RHEUMATOLOGY | Age: 79
End: 2024-07-03

## 2024-07-03 NOTE — TELEPHONE ENCOUNTER
----- Message from Jayson Tucker DO sent at 7/2/2024  4:33 PM EDT -----  Lab testing is to help evaluate for hemochromatosis was negative

## 2024-07-11 ENCOUNTER — OFFICE VISIT (OUTPATIENT)
Dept: CARDIOLOGY CLINIC | Age: 79
End: 2024-07-11
Payer: MEDICARE

## 2024-07-11 VITALS
SYSTOLIC BLOOD PRESSURE: 112 MMHG | BODY MASS INDEX: 28.9 KG/M2 | WEIGHT: 147.2 LBS | HEIGHT: 60 IN | HEART RATE: 73 BPM | DIASTOLIC BLOOD PRESSURE: 62 MMHG

## 2024-07-11 DIAGNOSIS — I25.118 CORONARY ARTERY DISEASE INVOLVING NATIVE CORONARY ARTERY OF NATIVE HEART WITH OTHER FORM OF ANGINA PECTORIS (HCC): Primary | ICD-10-CM

## 2024-07-11 PROCEDURE — 3074F SYST BP LT 130 MM HG: CPT | Performed by: INTERNAL MEDICINE

## 2024-07-11 PROCEDURE — 1123F ACP DISCUSS/DSCN MKR DOCD: CPT | Performed by: INTERNAL MEDICINE

## 2024-07-11 PROCEDURE — 3078F DIAST BP <80 MM HG: CPT | Performed by: INTERNAL MEDICINE

## 2024-07-11 PROCEDURE — 99213 OFFICE O/P EST LOW 20 MIN: CPT | Performed by: INTERNAL MEDICINE

## 2024-07-11 PROCEDURE — 93000 ELECTROCARDIOGRAM COMPLETE: CPT | Performed by: INTERNAL MEDICINE

## 2024-07-11 RX ORDER — VITAMIN E (DL,TOCOPHERYL ACET) 180 MG
CAPSULE ORAL DAILY
COMMUNITY

## 2024-07-11 NOTE — PROGRESS NOTES
[facility] PHYSICIANS [place] [facility]  [facility] CARDIOLOGY  [address]  [address]  [facility] [address]  Dept: [phone]  Dept Fax: [phone]  Loc: [phone]    Visit Date: 7/11/2024    Ms. Bro is a 78 y.o. female  who presented for:  Chief Complaint   Patient presents with    1 Year Follow Up    Coronary Artery Disease       HPI:   HPI   78F hx of inferior STEMI 6/13/20 s/p PCI LAD & RCA, HTN, HLD, GERD who presents for follow-up. She thought she was having sob.  She is anemia.  Needs GI work-up.  Iron replacement going.  She is going to see hematology.  She is borderline DM.  Following with Caitlin - thought to be arthritis.  No angina.  No arm pain.  No LOC.  She is not doing much now due to sob.  She is going to [facility] [place].  She is on DAPT.  Not passing out.  No arm or jaw pain.  She felt sob more before but is not sure if its related to less activity because she is worried. No NTG SL prn use.  No LE edema. No orthopnea.       ECG (if obtained today):  Reviewed today, no acute findings.    Sinus Rhythm   Low voltage in precordial leads.      Current Outpatient Medications:     Magnesium Oxide -Mg Supplement (RA MAGNESIUM) 500 MG CAPS, Take by mouth daily, Disp: , Rfl:     metoprolol tartrate (LOPRESSOR) 25 MG tablet, TAKE 1 TABLET TWICE A DAY, Disp: 180 tablet, Rfl: 3    BRILINTA 90 MG TABS tablet, TAKE 1 TABLET TWICE A DAY, Disp: 180 tablet, Rfl: 3    pantoprazole (PROTONIX) 40 MG tablet, TAKE 1 TABLET EVERY MORNING BEFORE BREAKFAST, Disp: 90 tablet, Rfl: 3    atorvastatin (LIPITOR) 80 MG tablet, Take 1 tablet by mouth nightly, Disp: 90 tablet, Rfl: 3    lidocaine (XYLOCAINE) 5 % ointment, Apply topically as needed., Disp: 1 each, Rfl: 0    amLODIPine (NORVASC) 5 MG tablet, Take 1 tablet by mouth daily (Patient taking differently: Take 1 tablet by mouth in the morning and at bedtime), Disp: 30 tablet, Rfl: 0    nitroGLYCERIN (NITROSTAT) 0.4 MG SL tablet, up to max of 3 total doses.

## 2024-07-11 NOTE — PATIENT INSTRUCTIONS
Your nurses today were JULIO C Edwards and MARYURI Michelle  Your provider today was Dr. Mcleod  Phone number: [phone]      You may receive a survey regarding the care you received during your visit.  Your input is valuable to us.  We encourage you to complete and return your survey.  We hope you will choose us in the future for your healthcare needs.   
PRINCIPAL DISCHARGE DIAGNOSIS  Diagnosis: Bacteremia due to Enterococcus  Assessment and Plan of Treatment: secondary to enteroccocus UTI. 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  End date: 4/2/2020 for a 6 week course.   Please obtain weekly CBC CMP ESR CRP      SECONDARY DISCHARGE DIAGNOSES  Diagnosis: Prostate cancer  Assessment and Plan of Treatment: History of prostate cancer with chronic morrison catheter which was changed during the hospitalization. Continue with morrison care, patient to follow up with outpatient urologist. Continue with casodex as prescribed. Continue with medications as prescribed.    Diagnosis: Chronic back pain  Assessment and Plan of Treatment: Continue with medication regimen and physical therapy.    Diagnosis: Atrial fibrillation  Assessment and Plan of Treatment: Continue with current medications.  Not on anticoagulation due to history of hematuria.    Diagnosis: Essential hypertension  Assessment and Plan of Treatment: Continue with current medications.    Diagnosis: Depression  Assessment and Plan of Treatment: Continue with current medications.

## 2024-07-16 ENCOUNTER — HOSPITAL ENCOUNTER (OUTPATIENT)
Dept: NUCLEAR MEDICINE | Age: 79
Discharge: HOME | End: 2024-07-16
Attending: INTERNAL MEDICINE
Payer: MEDICARE

## 2024-07-16 ENCOUNTER — HOSPITAL ENCOUNTER (OUTPATIENT)
Age: 79
Discharge: HOME | End: 2024-07-18
Attending: INTERNAL MEDICINE
Payer: MEDICARE

## 2024-07-16 VITALS — WEIGHT: 147 LBS | HEIGHT: 60 IN | BODY MASS INDEX: 28.86 KG/M2

## 2024-07-16 DIAGNOSIS — I25.118 CORONARY ARTERY DISEASE INVOLVING NATIVE CORONARY ARTERY OF NATIVE HEART WITH OTHER FORM OF ANGINA PECTORIS (HCC): ICD-10-CM

## 2024-07-16 LAB
ECHO BSA: 1.68 M2
NUC STRESS EJECTION FRACTION: 69 %
STRESS BASELINE DIAS BP: 60 MMHG
STRESS BASELINE HR: 65 BPM
STRESS BASELINE ST DEPRESSION: 0 MM
STRESS BASELINE SYS BP: 118 MMHG
STRESS ST DEPRESSION: 0 MM
STRESS STAGE 1 BP: NORMAL MMHG
STRESS STAGE 1 DURATION: 1 MIN:SEC
STRESS STAGE 1 HR: 63 BPM
STRESS STAGE 2 BP: NORMAL MMHG
STRESS STAGE 2 DURATION: 1 MIN:SEC
STRESS STAGE 2 HR: 69 BPM
STRESS STAGE 3 BP: NORMAL MMHG
STRESS STAGE 3 DURATION: 1 MIN:SEC
STRESS STAGE 3 HR: 82 BPM
STRESS STAGE RECOVERY 1 BP: NORMAL MMHG
STRESS STAGE RECOVERY 1 DURATION: 1 MIN:SEC
STRESS STAGE RECOVERY 1 HR: 79 BPM
STRESS STAGE RECOVERY 2 BP: NORMAL MMHG
STRESS STAGE RECOVERY 2 DURATION: 1 MIN:SEC
STRESS STAGE RECOVERY 2 HR: 77 BPM
STRESS STAGE RECOVERY 3 BP: NORMAL MMHG
STRESS STAGE RECOVERY 3 DURATION: 1 MIN:SEC
STRESS STAGE RECOVERY 3 HR: 73 BPM
STRESS STAGE RECOVERY 4 BP: NORMAL MMHG
STRESS STAGE RECOVERY 4 DURATION: 2 MIN:SEC
STRESS STAGE RECOVERY 4 HR: 72 BPM
STRESS TARGET HR: 144 BPM
TID: 1.16

## 2024-07-16 PROCEDURE — 93018 CV STRESS TEST I&R ONLY: CPT | Performed by: INTERNAL MEDICINE

## 2024-07-16 PROCEDURE — 78452 HT MUSCLE IMAGE SPECT MULT: CPT

## 2024-07-16 PROCEDURE — 78452 HT MUSCLE IMAGE SPECT MULT: CPT | Performed by: INTERNAL MEDICINE

## 2024-07-16 PROCEDURE — A9500 TC99M SESTAMIBI: HCPCS | Performed by: INTERNAL MEDICINE

## 2024-07-16 PROCEDURE — 6360000002 HC RX W HCPCS: Performed by: INTERNAL MEDICINE

## 2024-07-16 PROCEDURE — 93017 CV STRESS TEST TRACING ONLY: CPT

## 2024-07-16 PROCEDURE — 3430000000 HC RX DIAGNOSTIC RADIOPHARMACEUTICAL: Performed by: INTERNAL MEDICINE

## 2024-07-16 PROCEDURE — 93016 CV STRESS TEST SUPVJ ONLY: CPT | Performed by: INTERNAL MEDICINE

## 2024-07-16 RX ORDER — REGADENOSON 0.08 MG/ML
0.4 INJECTION, SOLUTION INTRAVENOUS
Status: COMPLETED | OUTPATIENT
Start: 2024-07-16 | End: 2024-07-16

## 2024-07-16 RX ORDER — TETRAKIS(2-METHOXYISOBUTYLISOCYANIDE)COPPER(I) TETRAFLUOROBORATE 1 MG/ML
32.2 INJECTION, POWDER, LYOPHILIZED, FOR SOLUTION INTRAVENOUS
Status: COMPLETED | OUTPATIENT
Start: 2024-07-16 | End: 2024-07-16

## 2024-07-16 RX ORDER — TETRAKIS(2-METHOXYISOBUTYLISOCYANIDE)COPPER(I) TETRAFLUOROBORATE 1 MG/ML
9.6 INJECTION, POWDER, LYOPHILIZED, FOR SOLUTION INTRAVENOUS
Status: COMPLETED | OUTPATIENT
Start: 2024-07-16 | End: 2024-07-16

## 2024-07-16 RX ADMIN — Medication 9.6 MILLICURIE: at 08:24

## 2024-07-16 RX ADMIN — Medication 32.2 MILLICURIE: at 09:21

## 2024-07-16 RX ADMIN — REGADENOSON 0.4 MG: 0.08 INJECTION, SOLUTION INTRAVENOUS at 09:20

## 2024-07-17 ENCOUNTER — TELEPHONE (OUTPATIENT)
Dept: CARDIOLOGY CLINIC | Age: 79
End: 2024-07-17

## 2024-07-17 NOTE — TELEPHONE ENCOUNTER
Medical therapy  No new blockages likely findings relate to her prior MI  If her symptoms are fine and she is stable, she may proceed with her vacation as scheduled

## 2024-07-17 NOTE — TELEPHONE ENCOUNTER
Stress results in EPIC.   OV note from 7/11/2024:  Re-check Lexiscan. She is going [place] trip [place] seas.

## 2024-07-23 ENCOUNTER — HOSPITAL ENCOUNTER (EMERGENCY)
Age: 79
Discharge: HOME | End: 2024-07-23
Payer: MEDICARE

## 2024-07-23 VITALS
TEMPERATURE: 97.6 F | BODY MASS INDEX: 29.49 KG/M2 | RESPIRATION RATE: 16 BRPM | SYSTOLIC BLOOD PRESSURE: 123 MMHG | HEART RATE: 88 BPM | DIASTOLIC BLOOD PRESSURE: 79 MMHG | WEIGHT: 151 LBS | OXYGEN SATURATION: 97 %

## 2024-07-23 DIAGNOSIS — N30.01 ACUTE CYSTITIS WITH HEMATURIA: Primary | ICD-10-CM

## 2024-07-23 LAB
BILIRUB UR STRIP.AUTO-MCNC: NEGATIVE MG/DL
CHARACTER UR: CLEAR
COLOR, UA: YELLOW
GLUCOSE UR QL STRIP.AUTO: NEGATIVE MG/DL
KETONES UR QL STRIP.AUTO: ABNORMAL
NITRITE UR QL STRIP.AUTO: NEGATIVE
PH UR STRIP.AUTO: 5.5 [PH] (ref 5–9)
PROT UR STRIP.AUTO-MCNC: NEGATIVE MG/DL
RBC #/AREA URNS HPF: ABNORMAL /[HPF]
SP GR UR STRIP.AUTO: 1.02 (ref 1–1.03)
UROBILINOGEN, URINE: 0.2 EU/DL (ref 0.2–1)
WBC #/AREA URNS HPF: ABNORMAL /[HPF]

## 2024-07-23 PROCEDURE — 99213 OFFICE O/P EST LOW 20 MIN: CPT

## 2024-07-23 PROCEDURE — 81003 URINALYSIS AUTO W/O SCOPE: CPT

## 2024-07-23 PROCEDURE — 99214 OFFICE O/P EST MOD 30 MIN: CPT

## 2024-07-23 PROCEDURE — 87086 URINE CULTURE/COLONY COUNT: CPT

## 2024-07-23 RX ORDER — NITROFURANTOIN 25; 75 MG/1; MG/1
100 CAPSULE ORAL 2 TIMES DAILY
Qty: 10 CAPSULE | Refills: 0 | Status: SHIPPED | OUTPATIENT
Start: 2024-07-23 | End: 2024-07-28

## 2024-07-23 ASSESSMENT — ENCOUNTER SYMPTOMS
RESPIRATORY NEGATIVE: 1
ALLERGIC/IMMUNOLOGIC NEGATIVE: 1
VOMITING: 0
NAUSEA: 0
EYES NEGATIVE: 1
ABDOMINAL PAIN: 0

## 2024-07-23 ASSESSMENT — PAIN - FUNCTIONAL ASSESSMENT: PAIN_FUNCTIONAL_ASSESSMENT: NONE - DENIES PAIN

## 2024-07-23 NOTE — ED PROVIDER NOTES
[facility] URGENT CARE  Urgent Care Encounter       CHIEF COMPLAINT       Chief Complaint   Patient presents with    Urinary Frequency       Nurses Notes reviewed and I agree except as noted in the HPI.  HISTORY OF PRESENT ILLNESS   Rufina Bro is a 78 y.o. female who presents to urgent care for urinary frequency.  Symptoms started    The history is provided by the patient. No  was used.       REVIEW OF SYSTEMS     Review of Systems   Constitutional: Negative.  Negative for fever.   HENT: Negative.     Eyes: Negative.    Respiratory: Negative.     Cardiovascular: Negative.    Gastrointestinal:  Negative for abdominal pain, nausea and vomiting.   Endocrine: Negative.    Genitourinary:  Positive for dysuria.   Musculoskeletal: Negative.    Skin: Negative.    Allergic/Immunologic: Negative.    Neurological: Negative.    Hematological: Negative.    Psychiatric/Behavioral: Negative.         PAST MEDICAL HISTORY         Diagnosis Date    Anemia     CAD (coronary artery disease)     Vertigo        SURGICALHISTORY     Patient  has a past surgical history that includes Upper gastrointestinal endoscopy (N/A, 6/16/2020); Percutaneous Transluminal Coronary Angio; and Diagnostic Cardiac Cath Lab Procedure.    CURRENT MEDICATIONS       Previous Medications    AMLODIPINE (NORVASC) 5 MG TABLET    Take 1 tablet by mouth daily    ASPIRIN EC 81 MG EC TABLET    Take 1 tablet by mouth daily    ATORVASTATIN (LIPITOR) 80 MG TABLET    Take 1 tablet by mouth nightly    BRILINTA 90 MG TABS TABLET    TAKE 1 TABLET TWICE A DAY    LEVOTHYROXINE (SYNTHROID) 75 MCG TABLET    Take 1 tablet by mouth Daily    LIDOCAINE (XYLOCAINE) 5 % OINTMENT    Apply topically as needed.    MAGNESIUM OXIDE -MG SUPPLEMENT (RA MAGNESIUM) 500 MG CAPS    Take by mouth daily    METOPROLOL TARTRATE (LOPRESSOR) 25 MG TABLET    TAKE 1 TABLET TWICE A DAY    NITROGLYCERIN (NITROSTAT) 0.4 MG SL TABLET    up to max of 3 total doses. If no  and dry.      Capillary Refill: Capillary refill takes less than 2 seconds.   Neurological:      Mental Status: She is alert and oriented to person, place, and time.   Psychiatric:         Mood and Affect: Mood normal.         Behavior: Behavior normal.         DIAGNOSTIC RESULTS     Labs:  Results for orders placed or performed during the hospital encounter of 07/23/24   Urinalysis   Result Value Ref Range    Glucose, Ur Negative NEGATIVE mg/dl    Bilirubin, Urine Negative NEGATIVE    Ketones, Urine Trace (A) NEGATIVE    Specific Gravity, UA 1.025 1.002 - 1.030    Blood, Urine Moderate (A) NEGATIVE    pH, Urine 5.50 5.0 - 9.0    Protein, UA Negative NEGATIVE mg/dl    Urobilinogen, Urine 0.20 0.2 - 1.0 eu/dl    Nitrite, Urine Negative NEGATIVE    Leukocyte Esterase, Urine Moderate (A) NEGATIVE    Color, UA Yellow STRAW-YELLOW    Character, Urine Clear CLEAR-SL CLOUD       IMAGING:    No orders to display         EKG:      URGENT CARE COURSE:     Vitals:    07/23/24 1513   BP: 123/79   Pulse: 88   Resp: 16   Temp: 97.6 °F (36.4 °C)   TempSrc: Oral   SpO2: 97%   Weight: 68.5 kg (151 lb)       Medications - No data to display         PROCEDURES:  None    FINAL IMPRESSION      1. Acute cystitis with hematuria          DISPOSITION/ PLAN     Discharge home.  Assessment consistent with acute cystitis with hematuria.  Urinalysis was sent.  Prescription given for nitrofurantoin.  Discussed increasing fluid intake.  Discussed follow-up with family doctor in 3 to 5 days.  Go to the emergency room for any abdominal pain fever or new or worsening symptoms.  Patient agreed to above treatment plan all questions were answered.      PATIENT REFERRED TO:  Jorge Seo MD  [address] [place] [address] [address] / [facility] [address]      DISCHARGE MEDICATIONS:  New Prescriptions    NITROFURANTOIN, MACROCRYSTAL-MONOHYDRATE, (MACROBID) 100 MG CAPSULE    Take 1 capsule by mouth 2 times daily for 5 days       Discontinued Medications    No

## 2024-07-23 NOTE — DISCHARGE INSTRUCTIONS
Medication as prescribed.  Increase fluid intake.  Follow-up with family doctor in 3 to 5 days.  Go to emergency room for any abdominal pain, fever or any new or worsening symptoms.

## 2024-07-23 NOTE — ED NOTES
Addended by: DARY MICHELLE on: 11/11/2021 12:59 PM     Modules accepted: Orders     Discharge instructions and prescriptions reviewed with pt. Pt verbalized understanding. Pt ambulated out in stable condition.  Assessment unchanged upon discharge.     Tiera Chiu RN  07/23/24 3883

## 2024-07-23 NOTE — ED TRIAGE NOTES
Rufina arrives to room with complaint of  urinary frequency, small amounts of urine, urinary pressure  started today

## 2024-07-25 LAB
BACTERIA UR CULT: ABNORMAL
ORGANISM: ABNORMAL

## 2024-09-18 ENCOUNTER — OFFICE VISIT (OUTPATIENT)
Dept: RHEUMATOLOGY | Age: 79
End: 2024-09-18
Payer: MEDICARE

## 2024-09-18 VITALS
OXYGEN SATURATION: 97 % | BODY MASS INDEX: 28.86 KG/M2 | HEIGHT: 60 IN | WEIGHT: 147 LBS | SYSTOLIC BLOOD PRESSURE: 100 MMHG | DIASTOLIC BLOOD PRESSURE: 50 MMHG | HEART RATE: 68 BPM

## 2024-09-18 DIAGNOSIS — D72.810 LYMPHOPENIA: ICD-10-CM

## 2024-09-18 DIAGNOSIS — M11.839 CALCIUM PYROPHOSPHATE DEPOSITION DISEASE OF WRIST: ICD-10-CM

## 2024-09-18 DIAGNOSIS — M85.80 OSTEOPENIA, UNSPECIFIED LOCATION: ICD-10-CM

## 2024-09-18 DIAGNOSIS — T69.1XXA CHILBLAIN LUPUS ERYTHEMATOSUS, INITIAL ENCOUNTER: Primary | ICD-10-CM

## 2024-09-18 PROCEDURE — 3078F DIAST BP <80 MM HG: CPT | Performed by: NURSE PRACTITIONER

## 2024-09-18 PROCEDURE — 3074F SYST BP LT 130 MM HG: CPT | Performed by: NURSE PRACTITIONER

## 2024-09-18 PROCEDURE — 1123F ACP DISCUSS/DSCN MKR DOCD: CPT | Performed by: NURSE PRACTITIONER

## 2024-09-18 PROCEDURE — 99214 OFFICE O/P EST MOD 30 MIN: CPT | Performed by: NURSE PRACTITIONER

## 2024-09-18 PROCEDURE — G2211 COMPLEX E/M VISIT ADD ON: HCPCS | Performed by: NURSE PRACTITIONER

## 2024-09-18 ASSESSMENT — ENCOUNTER SYMPTOMS
COUGH: 0
CONSTIPATION: 0
BACK PAIN: 0
NAUSEA: 0
TROUBLE SWALLOWING: 0
DIARRHEA: 0
ABDOMINAL PAIN: 0
EYE PAIN: 0
SHORTNESS OF BREATH: 0
EYE ITCHING: 0

## 2025-01-16 ENCOUNTER — OFFICE VISIT (OUTPATIENT)
Dept: CARDIOLOGY CLINIC | Age: 80
End: 2025-01-16

## 2025-01-16 VITALS
BODY MASS INDEX: 29.37 KG/M2 | HEART RATE: 80 BPM | DIASTOLIC BLOOD PRESSURE: 60 MMHG | SYSTOLIC BLOOD PRESSURE: 122 MMHG | HEIGHT: 60 IN | WEIGHT: 149.6 LBS

## 2025-01-16 DIAGNOSIS — I25.10 CORONARY ARTERY DISEASE INVOLVING NATIVE CORONARY ARTERY OF NATIVE HEART WITHOUT ANGINA PECTORIS: Primary | ICD-10-CM

## 2025-01-16 DIAGNOSIS — T69.1XXS CHILBLAIN LUPUS ERYTHEMATOSUS, SEQUELA: ICD-10-CM

## 2025-01-16 RX ORDER — METOPROLOL TARTRATE 25 MG/1
25 TABLET, FILM COATED ORAL 2 TIMES DAILY
Qty: 180 TABLET | Refills: 3 | Status: SHIPPED | OUTPATIENT
Start: 2025-01-16

## 2025-01-16 RX ORDER — AMLODIPINE BESYLATE 5 MG/1
5 TABLET ORAL 2 TIMES DAILY
Qty: 180 TABLET | Refills: 3 | Status: SHIPPED | OUTPATIENT
Start: 2025-01-16

## 2025-01-16 RX ORDER — AMLODIPINE BESYLATE 5 MG/1
5 TABLET ORAL 2 TIMES DAILY
Qty: 180 TABLET | Refills: 3 | Status: SHIPPED | OUTPATIENT
Start: 2025-01-16 | End: 2025-01-16 | Stop reason: SDUPTHER

## 2025-01-16 RX ORDER — METOPROLOL TARTRATE 25 MG/1
25 TABLET, FILM COATED ORAL 2 TIMES DAILY
Qty: 180 TABLET | Refills: 3 | Status: SHIPPED | OUTPATIENT
Start: 2025-01-16 | End: 2025-01-16 | Stop reason: SDUPTHER

## 2025-01-16 RX ORDER — NITROGLYCERIN 0.4 MG/1
TABLET SUBLINGUAL
Qty: 25 TABLET | Refills: 3 | Status: SHIPPED | OUTPATIENT
Start: 2025-01-16

## 2025-01-16 RX ORDER — ATORVASTATIN CALCIUM 80 MG/1
80 TABLET, FILM COATED ORAL NIGHTLY
Qty: 90 TABLET | Refills: 3 | Status: SHIPPED | OUTPATIENT
Start: 2025-01-16

## 2025-01-16 RX ORDER — PANTOPRAZOLE SODIUM 40 MG/1
40 TABLET, DELAYED RELEASE ORAL
Qty: 90 TABLET | Refills: 3 | Status: SHIPPED | OUTPATIENT
Start: 2025-01-16

## 2025-01-16 RX ORDER — PANTOPRAZOLE SODIUM 40 MG/1
40 TABLET, DELAYED RELEASE ORAL
Qty: 90 TABLET | Refills: 3 | Status: SHIPPED | OUTPATIENT
Start: 2025-01-16 | End: 2025-01-16 | Stop reason: SDUPTHER

## 2025-01-16 RX ORDER — ATORVASTATIN CALCIUM 80 MG/1
80 TABLET, FILM COATED ORAL NIGHTLY
Qty: 90 TABLET | Refills: 3 | Status: SHIPPED | OUTPATIENT
Start: 2025-01-16 | End: 2025-01-16 | Stop reason: SDUPTHER

## 2025-01-16 RX ORDER — LEVOTHYROXINE SODIUM 75 UG/1
75 TABLET ORAL DAILY
COMMUNITY

## 2025-01-16 RX ORDER — NITROGLYCERIN 0.4 MG/1
TABLET SUBLINGUAL
Qty: 25 TABLET | Refills: 3 | Status: SHIPPED | OUTPATIENT
Start: 2025-01-16 | End: 2025-01-16 | Stop reason: SDUPTHER

## 2025-01-16 NOTE — PATIENT INSTRUCTIONS
Your nurses today were JULIO C Edwards and MARYURI Hogan  Your provider today was Dr. Mcleod  Phone number: [phone]

## 2025-01-16 NOTE — TELEPHONE ENCOUNTER
Refills were sent to [facility] but patient states they need to go to [facility].   I called [facility] and spoke to Maria Fernanda, who states the patient's account is no longer active.  I was then put on hold for 28 minutes, I had to hand up.   I called [facility] back and she states she will get someone to help me within 3 minutes. I was put in contact with Patti, she is aware RX were sent in but that they need to be cancelled.   I then was transferred to [facility] and she was able to cancel the prescriptions.   RX pended to go to [facility].

## 2025-01-16 NOTE — PROGRESS NOTES
Patient here for a 6 month follow up    Med list up to date and pharmacy verified  Scripts pended   
constipation, diarrhea   Endocrine: Negative for cold intolerance, heat intolerance, polydipsia.  Genitourinary: Negative for dysuria, enuresis, flank pain and hematuria.   Musculoskeletal: Negative for arthralgias, joint swelling and neck pain.   Neurological: Negative for numbness and headaches.   Psychiatric/Behavioral: Negative for agitation, confusion, decreased concentration and dysphoric mood.     Objective:     /60   Pulse 80   Ht 1.524 m (5')   Wt 67.9 kg (149 lb 9.6 oz)   BMI 29.22 kg/m²     Wt Readings from Last 3 Encounters:   01/16/25 67.9 kg (149 lb 9.6 oz)   09/18/24 66.7 kg (147 lb)   07/23/24 68.5 kg (151 lb)     BP Readings from Last 3 Encounters:   01/16/25 122/60   09/18/24 (!) 100/50   07/23/24 123/79       Nursing note and vitals reviewed.    Physical Exam   Constitutional: Oriented to person, place, and time. Appears well-developed and well-nourished.   HENT:   Head: Normocephalic and atraumatic.   Eyes: EOM are normal. Pupils are equal, round, and reactive to light.   Neck: Normal range of motion. Neck supple. No JVD present.   Cardiovascular: Normal rate, regular rhythm, normal heart sounds and intact distal pulses.    No murmur heard.  Pulmonary/Chest: Effort normal and breath sounds normal. No respiratory distress. No wheezes. No rales.   Abdominal: Soft. Bowel sounds are normal. No distension. There is no tenderness.   Musculoskeletal: Normal range of motion. No edema.   Neurological: Alert and oriented to person, place, and time. No cranial nerve deficit. Coordination normal.   Skin: Skin is warm and dry.   Psychiatric: Normal mood and affect.     Physical Exam           Lab Results   Component Value Date/Time    CKTOTAL 141 01/13/2023 08:49 AM       Lab Results   Component Value Date/Time    WBC 6.7 06/11/2024 09:16 AM    RBC 4.26 06/11/2024 09:16 AM    HGB 12.3 07/22/2024 09:40 AM    HCT 39.1 07/22/2024 09:40 AM    MCV 66.5 06/11/2024 09:16 AM    MCH 20.7 06/11/2024 09:16 AM

## 2025-03-19 ENCOUNTER — OFFICE VISIT (OUTPATIENT)
Age: 80
End: 2025-03-19
Payer: MEDICARE

## 2025-03-19 VITALS
OXYGEN SATURATION: 97 % | SYSTOLIC BLOOD PRESSURE: 102 MMHG | DIASTOLIC BLOOD PRESSURE: 62 MMHG | BODY MASS INDEX: 28.35 KG/M2 | WEIGHT: 144.4 LBS | HEART RATE: 67 BPM | HEIGHT: 60 IN

## 2025-03-19 DIAGNOSIS — T69.1XXA CHILBLAIN LUPUS ERYTHEMATOSUS, INITIAL ENCOUNTER: Primary | ICD-10-CM

## 2025-03-19 DIAGNOSIS — M85.80 OSTEOPENIA, UNSPECIFIED LOCATION: ICD-10-CM

## 2025-03-19 PROCEDURE — 3078F DIAST BP <80 MM HG: CPT | Performed by: INTERNAL MEDICINE

## 2025-03-19 PROCEDURE — 99213 OFFICE O/P EST LOW 20 MIN: CPT | Performed by: INTERNAL MEDICINE

## 2025-03-19 PROCEDURE — G2211 COMPLEX E/M VISIT ADD ON: HCPCS | Performed by: INTERNAL MEDICINE

## 2025-03-19 PROCEDURE — 1123F ACP DISCUSS/DSCN MKR DOCD: CPT | Performed by: INTERNAL MEDICINE

## 2025-03-19 PROCEDURE — 3074F SYST BP LT 130 MM HG: CPT | Performed by: INTERNAL MEDICINE

## 2025-03-19 PROCEDURE — 1126F AMNT PAIN NOTED NONE PRSNT: CPT | Performed by: INTERNAL MEDICINE

## 2025-03-19 PROCEDURE — 1159F MED LIST DOCD IN RCRD: CPT | Performed by: INTERNAL MEDICINE

## 2025-03-19 ASSESSMENT — ENCOUNTER SYMPTOMS
EYE PAIN: 0
ABDOMINAL PAIN: 0
DIARRHEA: 0
EYE ITCHING: 0
CONSTIPATION: 0
BACK PAIN: 0
SHORTNESS OF BREATH: 0
NAUSEA: 0
COUGH: 0
TROUBLE SWALLOWING: 0

## 2025-03-19 NOTE — PROGRESS NOTES
[facility] RHEUMATOLOGY FOLLOW UP NOTE       Date Of Service: 3/19/2025  Provider: ITALO MIRANDA DO    Name: Rufina Bro   MRN: [id number]    Chief Complaint(s)     Chief Complaint   Patient presents with    Follow-up     6-month f/u for Chilblain Lupus.         History of Present Illness (HPI)     Rufina Bro  is a(n)79 y.o. female with a hx of anemia, gastritic, ischemic cardiomyopathy, MI, CAD s/p stenting, GI hemorrhage, hypothyroidism, diastolic heart failure, bilateral shoulder pain s/p left shoulder surgery, acute respiratory failure with hypoxia, hiatal hernia, metabolic acidosis, hypocalcemia, hyperlipidemia, hypertension, anxiety and depression here for the f/u evaluation of chillblains lupus     Interval hx:       Norvasc controlling the chilblains - no flares reported     Ankles pain - intermittent - localized - mainly in the evening and improved w/ movement , wt bearing. Denies any joint pains, swelling, or morning stiffness.       REVIEW OF SYSTEMS: (ROS)    Review of Systems   Constitutional:  Negative for fatigue, fever and unexpected weight change.   HENT:  Negative for congestion and trouble swallowing.    Eyes:  Negative for pain and itching.   Respiratory:  Negative for cough and shortness of breath.    Cardiovascular:  Negative for chest pain and leg swelling.   Gastrointestinal:  Negative for abdominal pain, constipation, diarrhea and nausea.   Endocrine: Negative for cold intolerance and heat intolerance.   Genitourinary:  Negative for difficulty urinating, frequency and urgency.   Musculoskeletal:  Negative for arthralgias, back pain and joint swelling.   Skin:  Negative for rash.   Neurological:  Negative for dizziness, weakness, numbness and headaches.   Hematological:  Does not bruise/bleed easily.   Psychiatric/Behavioral:  The patient is not nervous/anxious.      PmHx:  has a past medical history of Anemia, CAD (coronary artery disease), Chilblain lupus, and Vertigo.     Social

## (undated) DEVICE — TUBING IV STOPCOCK 48 CM 3 W

## (undated) DEVICE — SET LNR RED GRN W/ BASE CLEANASCOPE

## (undated) DEVICE — CONMED SCOPE SAVER BITE BLOCK, 20X27 MM: Brand: SCOPE SAVER

## (undated) DEVICE — KIT INF CTRL 2OZ LUB TBNG L12FT DBL END BRSH SYR OP4

## (undated) DEVICE — SOLUTION IV IRRIG WATER 1000ML POUR BRL 2F7114